# Patient Record
Sex: FEMALE | Race: WHITE
[De-identification: names, ages, dates, MRNs, and addresses within clinical notes are randomized per-mention and may not be internally consistent; named-entity substitution may affect disease eponyms.]

---

## 2018-08-03 ENCOUNTER — HOSPITAL ENCOUNTER (INPATIENT)
Dept: HOSPITAL 61 - 6 SOUTH | Age: 83
LOS: 14 days | Discharge: NURSING, REHAB OR LONG-TERM CARE | DRG: 374 | End: 2018-08-17
Attending: FAMILY MEDICINE | Admitting: FAMILY MEDICINE
Payer: MEDICARE

## 2018-08-03 VITALS — SYSTOLIC BLOOD PRESSURE: 125 MMHG | DIASTOLIC BLOOD PRESSURE: 51 MMHG

## 2018-08-03 VITALS — BODY MASS INDEX: 39.98 KG/M2 | HEIGHT: 62 IN | WEIGHT: 217.25 LBS

## 2018-08-03 VITALS — DIASTOLIC BLOOD PRESSURE: 55 MMHG | SYSTOLIC BLOOD PRESSURE: 118 MMHG

## 2018-08-03 VITALS — DIASTOLIC BLOOD PRESSURE: 49 MMHG | SYSTOLIC BLOOD PRESSURE: 126 MMHG

## 2018-08-03 DIAGNOSIS — M06.9: ICD-10-CM

## 2018-08-03 DIAGNOSIS — Z95.1: ICD-10-CM

## 2018-08-03 DIAGNOSIS — Z85.3: ICD-10-CM

## 2018-08-03 DIAGNOSIS — E78.5: ICD-10-CM

## 2018-08-03 DIAGNOSIS — K29.70: ICD-10-CM

## 2018-08-03 DIAGNOSIS — E83.42: ICD-10-CM

## 2018-08-03 DIAGNOSIS — N39.0: ICD-10-CM

## 2018-08-03 DIAGNOSIS — Z79.82: ICD-10-CM

## 2018-08-03 DIAGNOSIS — F32.9: ICD-10-CM

## 2018-08-03 DIAGNOSIS — E43: ICD-10-CM

## 2018-08-03 DIAGNOSIS — I27.20: ICD-10-CM

## 2018-08-03 DIAGNOSIS — Z96.653: ICD-10-CM

## 2018-08-03 DIAGNOSIS — C15.5: Primary | ICD-10-CM

## 2018-08-03 DIAGNOSIS — Z87.891: ICD-10-CM

## 2018-08-03 DIAGNOSIS — D50.9: ICD-10-CM

## 2018-08-03 DIAGNOSIS — K76.0: ICD-10-CM

## 2018-08-03 DIAGNOSIS — I25.10: ICD-10-CM

## 2018-08-03 DIAGNOSIS — I50.21: ICD-10-CM

## 2018-08-03 DIAGNOSIS — K59.00: ICD-10-CM

## 2018-08-03 DIAGNOSIS — R91.8: ICD-10-CM

## 2018-08-03 DIAGNOSIS — B96.81: ICD-10-CM

## 2018-08-03 DIAGNOSIS — Z79.01: ICD-10-CM

## 2018-08-03 DIAGNOSIS — K57.90: ICD-10-CM

## 2018-08-03 DIAGNOSIS — I63.40: ICD-10-CM

## 2018-08-03 DIAGNOSIS — E87.6: ICD-10-CM

## 2018-08-03 DIAGNOSIS — M19.90: ICD-10-CM

## 2018-08-03 DIAGNOSIS — K64.8: ICD-10-CM

## 2018-08-03 DIAGNOSIS — I25.5: ICD-10-CM

## 2018-08-03 DIAGNOSIS — E66.9: ICD-10-CM

## 2018-08-03 DIAGNOSIS — Z79.899: ICD-10-CM

## 2018-08-03 DIAGNOSIS — Z95.5: ICD-10-CM

## 2018-08-03 DIAGNOSIS — Z90.13: ICD-10-CM

## 2018-08-03 DIAGNOSIS — D50.0: ICD-10-CM

## 2018-08-03 DIAGNOSIS — Z91.011: ICD-10-CM

## 2018-08-03 DIAGNOSIS — F41.9: ICD-10-CM

## 2018-08-03 DIAGNOSIS — K21.9: ICD-10-CM

## 2018-08-03 DIAGNOSIS — E11.649: ICD-10-CM

## 2018-08-03 DIAGNOSIS — I11.0: ICD-10-CM

## 2018-08-03 DIAGNOSIS — D68.9: ICD-10-CM

## 2018-08-03 DIAGNOSIS — Z88.8: ICD-10-CM

## 2018-08-03 DIAGNOSIS — I48.0: ICD-10-CM

## 2018-08-03 DIAGNOSIS — K44.9: ICD-10-CM

## 2018-08-03 LAB
ALBUMIN SERPL-MCNC: 2.4 G/DL (ref 3.4–5)
ALBUMIN/GLOB SERPL: 0.6 {RATIO} (ref 1–1.7)
ALP SERPL-CCNC: 157 U/L (ref 46–116)
ALT SERPL-CCNC: 27 U/L (ref 14–59)
ANION GAP SERPL CALC-SCNC: 8 MMOL/L (ref 6–14)
AST SERPL-CCNC: 21 U/L (ref 15–37)
BASOPHILS # BLD AUTO: 0 X10^3/UL (ref 0–0.2)
BASOPHILS NFR BLD: 1 % (ref 0–3)
BILIRUB SERPL-MCNC: 0.5 MG/DL (ref 0.2–1)
BUN SERPL-MCNC: 17 MG/DL (ref 7–20)
BUN/CREAT SERPL: 17 (ref 6–20)
CALCIUM SERPL-MCNC: 7.7 MG/DL (ref 8.5–10.1)
CHLORIDE SERPL-SCNC: 102 MMOL/L (ref 98–107)
CO2 SERPL-SCNC: 26 MMOL/L (ref 21–32)
CREAT SERPL-MCNC: 1 MG/DL (ref 0.6–1)
EOSINOPHIL NFR BLD: 0 % (ref 0–3)
EOSINOPHIL NFR BLD: 0 X10^3/UL (ref 0–0.7)
ERYTHROCYTE [DISTWIDTH] IN BLOOD BY AUTOMATED COUNT: 19.7 % (ref 11.5–14.5)
GFR SERPLBLD BASED ON 1.73 SQ M-ARVRAT: 53.1 ML/MIN
GLOBULIN SER-MCNC: 4.2 G/DL (ref 2.2–3.8)
GLUCOSE SERPL-MCNC: 110 MG/DL (ref 70–99)
HCT VFR BLD CALC: 24.5 % (ref 36–47)
HGB BLD-MCNC: 8 G/DL (ref 12–15.5)
LYMPHOCYTES # BLD: 0.9 X10^3/UL (ref 1–4.8)
LYMPHOCYTES NFR BLD AUTO: 11 % (ref 24–48)
MCH RBC QN AUTO: 27 PG (ref 25–35)
MCHC RBC AUTO-ENTMCNC: 33 G/DL (ref 31–37)
MCV RBC AUTO: 83 FL (ref 79–100)
MONO #: 0.5 X10^3/UL (ref 0–1.1)
MONOCYTES NFR BLD: 7 % (ref 0–9)
NEUT #: 6.5 X10^3UL (ref 1.8–7.7)
NEUTROPHILS NFR BLD AUTO: 81 % (ref 31–73)
PLATELET # BLD AUTO: 369 X10^3/UL (ref 140–400)
POTASSIUM SERPL-SCNC: 3.7 MMOL/L (ref 3.5–5.1)
PROT SERPL-MCNC: 6.6 G/DL (ref 6.4–8.2)
PROTHROMBIN TIME: 39.9 SEC (ref 11.7–14)
RBC # BLD AUTO: 2.94 X10^6/UL (ref 3.5–5.4)
SODIUM SERPL-SCNC: 136 MMOL/L (ref 136–145)
WBC # BLD AUTO: 8 X10^3/UL (ref 4–11)

## 2018-08-03 PROCEDURE — 93880 EXTRACRANIAL BILAT STUDY: CPT

## 2018-08-03 PROCEDURE — 80053 COMPREHEN METABOLIC PANEL: CPT

## 2018-08-03 PROCEDURE — 85520 HEPARIN ASSAY: CPT

## 2018-08-03 PROCEDURE — 85045 AUTOMATED RETICULOCYTE COUNT: CPT

## 2018-08-03 PROCEDURE — 70450 CT HEAD/BRAIN W/O DYE: CPT

## 2018-08-03 PROCEDURE — 74177 CT ABD & PELVIS W/CONTRAST: CPT

## 2018-08-03 PROCEDURE — 70551 MRI BRAIN STEM W/O DYE: CPT

## 2018-08-03 PROCEDURE — 93306 TTE W/DOPPLER COMPLETE: CPT

## 2018-08-03 PROCEDURE — 82274 ASSAY TEST FOR BLOOD FECAL: CPT

## 2018-08-03 PROCEDURE — 71046 X-RAY EXAM CHEST 2 VIEWS: CPT

## 2018-08-03 PROCEDURE — 81001 URINALYSIS AUTO W/SCOPE: CPT

## 2018-08-03 PROCEDURE — 84443 ASSAY THYROID STIM HORMONE: CPT

## 2018-08-03 PROCEDURE — 88305 TISSUE EXAM BY PATHOLOGIST: CPT

## 2018-08-03 PROCEDURE — 78306 BONE IMAGING WHOLE BODY: CPT

## 2018-08-03 PROCEDURE — 71260 CT THORAX DX C+: CPT

## 2018-08-03 PROCEDURE — 83550 IRON BINDING TEST: CPT

## 2018-08-03 PROCEDURE — 82746 ASSAY OF FOLIC ACID SERUM: CPT

## 2018-08-03 PROCEDURE — 85027 COMPLETE CBC AUTOMATED: CPT

## 2018-08-03 PROCEDURE — 70552 MRI BRAIN STEM W/DYE: CPT

## 2018-08-03 PROCEDURE — 80048 BASIC METABOLIC PNL TOTAL CA: CPT

## 2018-08-03 PROCEDURE — A9503 TC99M MEDRONATE: HCPCS

## 2018-08-03 PROCEDURE — 87040 BLOOD CULTURE FOR BACTERIA: CPT

## 2018-08-03 PROCEDURE — 36415 COLL VENOUS BLD VENIPUNCTURE: CPT

## 2018-08-03 PROCEDURE — 83735 ASSAY OF MAGNESIUM: CPT

## 2018-08-03 PROCEDURE — 82607 VITAMIN B-12: CPT

## 2018-08-03 PROCEDURE — 85025 COMPLETE CBC W/AUTO DIFF WBC: CPT

## 2018-08-03 PROCEDURE — 45378 DIAGNOSTIC COLONOSCOPY: CPT

## 2018-08-03 PROCEDURE — 82728 ASSAY OF FERRITIN: CPT

## 2018-08-03 PROCEDURE — 82962 GLUCOSE BLOOD TEST: CPT

## 2018-08-03 PROCEDURE — 83540 ASSAY OF IRON: CPT

## 2018-08-03 PROCEDURE — A9585 GADOBUTROL INJECTION: HCPCS

## 2018-08-03 PROCEDURE — 83605 ASSAY OF LACTIC ACID: CPT

## 2018-08-03 PROCEDURE — 80061 LIPID PANEL: CPT

## 2018-08-03 PROCEDURE — 85610 PROTHROMBIN TIME: CPT

## 2018-08-03 RX ADMIN — ACETAMINOPHEN SCH MG: 500 TABLET ORAL at 20:44

## 2018-08-03 RX ADMIN — SIMVASTATIN SCH MG: 40 TABLET, FILM COATED ORAL at 20:43

## 2018-08-03 RX ADMIN — I-VITE, TAB 1000-60-2MG (60/BT) SCH TAB: TAB at 20:43

## 2018-08-03 RX ADMIN — CEFTRIAXONE SCH GM: 1 INJECTION, POWDER, FOR SOLUTION INTRAMUSCULAR; INTRAVENOUS at 19:50

## 2018-08-03 RX ADMIN — CARVEDILOL SCH MG: 3.12 TABLET, FILM COATED ORAL at 20:43

## 2018-08-04 VITALS — DIASTOLIC BLOOD PRESSURE: 55 MMHG | SYSTOLIC BLOOD PRESSURE: 133 MMHG

## 2018-08-04 VITALS — DIASTOLIC BLOOD PRESSURE: 40 MMHG | SYSTOLIC BLOOD PRESSURE: 115 MMHG

## 2018-08-04 VITALS — DIASTOLIC BLOOD PRESSURE: 46 MMHG | SYSTOLIC BLOOD PRESSURE: 122 MMHG

## 2018-08-04 VITALS — DIASTOLIC BLOOD PRESSURE: 48 MMHG | SYSTOLIC BLOOD PRESSURE: 147 MMHG

## 2018-08-04 VITALS — SYSTOLIC BLOOD PRESSURE: 127 MMHG | DIASTOLIC BLOOD PRESSURE: 58 MMHG

## 2018-08-04 VITALS — SYSTOLIC BLOOD PRESSURE: 123 MMHG | DIASTOLIC BLOOD PRESSURE: 48 MMHG

## 2018-08-04 LAB
APTT PPP: YELLOW S
BACTERIA #/AREA URNS HPF: (no result) /HPF
BILIRUB UR QL STRIP: NEGATIVE
FECAL OB PT: NEGATIVE
FIBRINOGEN PPP-MCNC: CLEAR MG/DL
NITRITE UR QL STRIP: NEGATIVE
PH UR STRIP: 6 [PH]
PROT UR STRIP-MCNC: NEGATIVE MG/DL
PROTHROMBIN TIME: 41.1 SEC (ref 11.7–14)
RBC #/AREA URNS HPF: (no result) /HPF (ref 0–2)
SQUAMOUS #/AREA URNS LPF: (no result) /LPF
UROBILINOGEN UR-MCNC: 0.2 MG/DL
WBC #/AREA URNS HPF: (no result) /HPF (ref 0–4)

## 2018-08-04 RX ADMIN — PANTOPRAZOLE SODIUM SCH MG: 40 TABLET, DELAYED RELEASE ORAL at 08:33

## 2018-08-04 RX ADMIN — CLOPIDOGREL BISULFATE SCH MG: 75 TABLET ORAL at 08:33

## 2018-08-04 RX ADMIN — INSULIN LISPRO SCH UNITS: 100 INJECTION, SOLUTION INTRAVENOUS; SUBCUTANEOUS at 17:09

## 2018-08-04 RX ADMIN — ISOSORBIDE MONONITRATE SCH MG: 30 TABLET, EXTENDED RELEASE ORAL at 08:34

## 2018-08-04 RX ADMIN — CHOLESTYRAMINE SCH GM: 4 POWDER, FOR SUSPENSION ORAL at 08:36

## 2018-08-04 RX ADMIN — Medication SCH MG: at 08:34

## 2018-08-04 RX ADMIN — I-VITE, TAB 1000-60-2MG (60/BT) SCH TAB: TAB at 20:25

## 2018-08-04 RX ADMIN — LISINOPRIL SCH MG: 10 TABLET ORAL at 08:34

## 2018-08-04 RX ADMIN — WARFARIN SODIUM SCH MG: 7.5 TABLET ORAL at 14:15

## 2018-08-04 RX ADMIN — INSULIN LISPRO SCH UNITS: 100 INJECTION, SOLUTION INTRAVENOUS; SUBCUTANEOUS at 12:00

## 2018-08-04 RX ADMIN — CARVEDILOL SCH MG: 3.12 TABLET, FILM COATED ORAL at 16:10

## 2018-08-04 RX ADMIN — DOCUSATE SODIUM SCH MG: 100 CAPSULE, LIQUID FILLED ORAL at 08:33

## 2018-08-04 RX ADMIN — BUMETANIDE SCH MG: 1 TABLET ORAL at 08:34

## 2018-08-04 RX ADMIN — ACETAMINOPHEN SCH MG: 500 TABLET ORAL at 08:33

## 2018-08-04 RX ADMIN — INSULIN GLARGINE SCH UNITS: 100 INJECTION, SOLUTION SUBCUTANEOUS at 08:00

## 2018-08-04 RX ADMIN — SIMVASTATIN SCH MG: 40 TABLET, FILM COATED ORAL at 20:25

## 2018-08-04 RX ADMIN — INSULIN LISPRO SCH UNITS: 100 INJECTION, SOLUTION INTRAVENOUS; SUBCUTANEOUS at 08:00

## 2018-08-04 RX ADMIN — CARVEDILOL SCH MG: 3.12 TABLET, FILM COATED ORAL at 08:34

## 2018-08-04 RX ADMIN — CITALOPRAM HYDROBROMIDE SCH MG: 10 TABLET ORAL at 08:33

## 2018-08-04 RX ADMIN — I-VITE, TAB 1000-60-2MG (60/BT) SCH TAB: TAB at 08:33

## 2018-08-04 RX ADMIN — ACETAMINOPHEN SCH MG: 500 TABLET ORAL at 20:25

## 2018-08-04 RX ADMIN — CEFTRIAXONE SCH GM: 1 INJECTION, POWDER, FOR SOLUTION INTRAMUSCULAR; INTRAVENOUS at 16:10

## 2018-08-04 NOTE — PDOC
GENERAL


General:


feels somewhat better this am. minimal sweating overnight and no chills. 

remains sob. Hb office earlier this week was 8.6 and is 8.0 this am. willl 

check cxr this am and get GI consult for iron deficiency anemia. continue 

antibiotics with profound chills and sweats prior to admission. patient denies 

any hematochezia, melena, or hematemesis. see dictated H&P.





VITAL SIGNS


Vital Signs:





Vital Signs








  Date Time  Temp Pulse Resp B/P (MAP) Pulse Ox O2 Delivery O2 Flow Rate FiO2


 


8/4/18 08:34  101  127/58    


 


8/4/18 07:24      Room Air  


 


8/4/18 07:00 97.8  16  98   





 97.8       











I & O


I & O











Intake and Output 


 


 8/4/18





 07:00


 


Intake Total 400 ml


 


Output Total 350 ml


 


Balance 50 ml


 


 


 


Intake Oral 400 ml


 


Output Urine Total 350 ml











ALLERGIES


Allergies:





Allergies








Coded Allergies Type Severity Reaction Last Updated Verified


 


  milk Allergy Intermediate  8/3/18 Yes


 


  montelukast Allergy Intermediate  8/3/18 Yes











MEDS


Medications:





Current Medications








 Medications


  (Trade)  Dose


 Ordered  Sig/Gerry  Start Time


 Stop Time Status Last Admin


Dose Admin


 


 Acetaminophen


  (Tylenol)  1,000 mg  BID  8/3/18 21:00


    8/4/18 08:33


1,000 MG


 


 Alprazolam


  (Xanax)  0.25 mg  PRN Q6HRS  PRN  8/3/18 18:45


     





 


 Bumetanide


  (Bumex)  2 mg  DAILY  8/4/18 09:00


    8/4/18 08:34


2 MG


 


 Carvedilol


  (Coreg)  3.125 mg  BIDWMEALS  8/3/18 21:00


    8/4/18 08:34


3.125 MG


 


 Ceftriaxone


 Sodium 1 gm/


 Dextrose  50 ml @ 


 100 mls/hr  Q24H  8/3/18 15:45


   UNV  





 


 Ceftriaxone Sodium


  (Rocephin)  1 gm  Q24H  8/3/18 17:00


    8/3/18 19:50


1 GM


 


 Cholestyramine


 Resin


  (Questran Light)  4 gm  DAILY  8/4/18 09:00


     





 


 Citalopram


 Hydrobromide


  (CeleXA)  10 mg  DAILY  8/4/18 09:00


    8/4/18 08:33


10 MG


 


 Clopidogrel


 Bisulfate


  (Plavix)  75 mg  DAILY  8/4/18 09:00


    8/4/18 08:33


75 MG


 


 Diphenhydramine


 HCl


  (Benadryl)  50 mg  QHS  8/3/18 21:00


    8/3/18 21:11


50 MG


 


 Docusate Sodium


  (Colace)  100 mg  DAILY  8/4/18 09:00


    8/4/18 08:33


100 MG


 


 Fish Oil


  (Fish Oil)  1,000 mg  DAILY  8/4/18 09:00


    8/4/18 08:34


1,000 MG


 


 Insulin Glargine


  (Lantus)  20 units  [id number]  8/4/18 08:00


     





 


 Insulin Human


 Lispro


  (HumaLOG)  5 units  TIDWMEALS  8/4/18 08:00


     





 


 Isosorbide


 Mononitrate


  (Imdur)  30 mg  DAILY  8/4/18 09:00


    8/4/18 08:34


30 MG


 


 Lisinopril


  (Prinivil)  10 mg  DAILY  8/4/18 09:00


    8/4/18 08:34


10 MG


 


 Methotrexate


  (Rheumatrex)  15 mg  WEEKLY  8/9/18 09:00


     





 


 Multivitamins/


 Minerals


  (I-Josias)  1 tab  BID  8/3/18 21:00


    8/4/18 08:33


1 TAB


 


 Non-Formulary


 Medication


  (Alendronate


 Sodium )  70 mg  WEEKLY  8/10/18 09:00


   UNV  





 


 Pantoprazole


 Sodium


  (Protonix)  40 mg  DAILYAC  8/4/18 07:30


    8/4/18 08:33


40 MG


 


 Simvastatin


  (Zocor)  40 mg  QHS  8/3/18 21:00


    8/3/18 20:43


40 MG


 


 Sodium Chloride  1,000 ml @ 


 125 mls/hr  1X  ONCE  8/3/18 15:45


 8/3/18 23:44 DC 8/3/18 19:50


125 MLS/HR


 


 Warfarin Sodium


  (Coumadin Per


 Physician)  1 each  PRN DAILY  PRN  8/3/18 20:00


     





 


 Warfarin Sodium


  (Coumadin)  7.5 mg  DAILY16  8/4/18 16:00


     














LAB


Lab:





Laboratory Tests








Test


 8/3/18


16:05 8/3/18


20:35 8/4/18


07:04 8/4/18


09:20


 


White Blood Count


 8.0 x10^3/uL


(4.0-11.0) 


 


 





 


Red Blood Count


 2.94 x10^6/uL


(3.50-5.40) 


 


 





 


Hemoglobin


 8.0 g/dL


(12.0-15.5) 


 


 





 


Hematocrit


 24.5 %


(36.0-47.0) 


 


 





 


Mean Corpuscular Volume 83 fL ()    


 


Mean Corpuscular Hemoglobin 27 pg (25-35)    


 


Mean Corpuscular Hemoglobin


Concent 33 g/dL


(31-37) 


 


 





 


Red Cell Distribution Width


 19.7 %


(11.5-14.5) 


 


 





 


Platelet Count


 369 x10^3/uL


(140-400) 


 


 





 


Neutrophils (%) (Auto) 81 % (31-73)    


 


Lymphocytes (%) (Auto) 11 % (24-48)    


 


Monocytes (%) (Auto) 7 % (0-9)    


 


Eosinophils (%) (Auto) 0 % (0-3)    


 


Basophils (%) (Auto) 1 % (0-3)    


 


Neutrophils # (Auto)


 6.5 x10^3uL


(1.8-7.7) 


 


 





 


Lymphocytes # (Auto)


 0.9 x10^3/uL


(1.0-4.8) 


 


 





 


Monocytes # (Auto)


 0.5 x10^3/uL


(0.0-1.1) 


 


 





 


Eosinophils # (Auto)


 0.0 x10^3/uL


(0.0-0.7) 


 


 





 


Basophils # (Auto)


 0.0 x10^3/uL


(0.0-0.2) 


 


 





 


Prothrombin Time


 39.9 SEC


(11.7-14.0) 


 


 





 


Prothromb Time International


Ratio 4.2 (0.8-1.1) 


 


 


 





 


Sodium Level


 136 mmol/L


(136-145) 


 


 





 


Potassium Level


 3.7 mmol/L


(3.5-5.1) 


 


 





 


Chloride Level


 102 mmol/L


() 


 


 





 


Carbon Dioxide Level


 26 mmol/L


(21-32) 


 


 





 


Anion Gap 8 (6-14)    


 


Blood Urea Nitrogen


 17 mg/dL


(7-20) 


 


 





 


Creatinine


 1.0 mg/dL


(0.6-1.0) 


 


 





 


Estimated GFR


(Cockcroft-Gault) 53.1 


 


 


 





 


BUN/Creatinine Ratio 17 (6-20)    


 


Glucose Level


 110 mg/dL


(70-99) 


 


 





 


Calcium Level


 7.7 mg/dL


(8.5-10.1) 


 


 





 


Iron Level


 15 ug/dL


() 


 


 





 


Total Iron Binding Capacity


 216 ug/dL


(250-450) 


 


 





 


Iron Saturation 7 % (15-34)    


 


Ferritin


 141 ng/mL


(8-252) 


 


 





 


Total Bilirubin


 0.5 mg/dL


(0.2-1.0) 


 


 





 


Aspartate Amino Transf


(AST/SGOT) 21 U/L (15-37) 


 


 


 





 


Alanine Aminotransferase


(ALT/SGPT) 27 U/L (14-59) 


 


 


 





 


Alkaline Phosphatase


 157 U/L


() 


 


 





 


Total Protein


 6.6 g/dL


(6.4-8.2) 


 


 





 


Albumin


 2.4 g/dL


(3.4-5.0) 


 


 





 


Albumin/Globulin Ratio 0.6 (1.0-1.7)    


 


Glucose (Fingerstick)


 


 98 mg/dL


(70-99) 96 mg/dL


(70-99) 





 


Urine Collection Type    Unknown 


 


Urine Color    Yellow 


 


Urine Clarity    Clear 


 


Urine pH    6.0 


 


Urine Specific Gravity    1.015 


 


Urine Protein


 


 


 


 Negative mg/dL


(NEG-TRACE)


 


Urine Glucose (UA)


 


 


 


 Negative mg/dL


(NEG)


 


Urine Ketones (Stick)


 


 


 


 Negative mg/dL


(NEG)


 


Urine Blood    Negative (NEG) 


 


Urine Nitrite    Negative (NEG) 


 


Urine Bilirubin    Negative (NEG) 


 


Urine Urobilinogen Dipstick


 


 


 


 0.2 mg/dL (0.2


mg/dL)


 


Urine Leukocyte Esterase    Negative (NEG) 


 


Urine RBC    Occ /HPF (0-2) 


 


Urine WBC    1-4 /HPF (0-4) 


 


Urine Squamous Epithelial


Cells 


 


 


 Few /LPF 





 


Urine Bacteria


 


 


 


 Few /HPF


(0-FEW)


 


Urine Mucus    Slight /LPF 

















MARSHALL RICHARDS MD Aug 4, 2018 10:43

## 2018-08-04 NOTE — PDOC2
GI CONSULT


Reason For Consult:


MIHAELA





HPI:


HPI:


Pleasant 81 y/o female admitted for evaluation of recent anemia and ENGLISH.  Here 

has iron studies c/w MIHAELA superimposed on ACD.  Has h/o heartburn in the past; 

taking daily omeprazole.  Some recent vague swallowing issues, not clearly true 

dysphagia.  No PUD, Liver or Pancreatic history.  S/p moncho.  No tobacco, 

alcohol use of note.  On Plavix and coumadin at home; INR a bit prolonged here.

  Denies overt blood in stool or melena.  No N, V.   H/o probable bile salt-

mediated diarrhea after the moncho; takes Questran off and on.  Has had 

occasional constipation.  Wt/appetite OK.  Thinks had remote colonoscopy ([facility]?

) in the past.  Recalls no findings.  No prior EGD.  GIFH negative.





PMH:


PMH:


ASHD, TIA's, HLP, HTN, Breast cancer (lucina?), OA, DM, anxiety/depression.  S/p 

coronary stenting, CABG, left lumpectomy, right mastectomy, bilateral TKR's.





FH:


Family History:  Other (No GI issues.)





Social History:


Smoke:  No


ALCOHOL:  none


Drugs:  None





ROS:





GEN: Denies fevers, chills, sweats


HEENT: Denies blurred vision, sore throat


CV: Denies chest pain


RESP: Exertional shortness of air, no cough


GI: Per HPI


: Denies hematuria, dysuria


ENDO: Denies weight changes


NEURO: Denies confusion, dizziness


MSK: Denies weakness, joint pain/swelling


SKIN: Denies jaundice, pruritus





Vitals:


Vitals:





 Vital Signs








  Date Time  Temp Pulse Resp B/P (MAP) Pulse Ox O2 Delivery O2 Flow Rate FiO2


 


8/4/18 10:48 98.3 89 16 123/48 (73) 93 Room Air  





 98.3       











Labs:


Labs:





Laboratory Tests








Test


 8/3/18


16:05 8/3/18


20:35 8/4/18


07:04 8/4/18


09:20


 


White Blood Count


 8.0 x10^3/uL


(4.0-11.0) 


 


 





 


Red Blood Count


 2.94 x10^6/uL


(3.50-5.40) 


 


 





 


Hemoglobin


 8.0 g/dL


(12.0-15.5) 


 


 





 


Hematocrit


 24.5 %


(36.0-47.0) 


 


 





 


Mean Corpuscular Volume 83 fL ()    


 


Mean Corpuscular Hemoglobin 27 pg (25-35)    


 


Mean Corpuscular Hemoglobin


Concent 33 g/dL


(31-37) 


 


 





 


Red Cell Distribution Width


 19.7 %


(11.5-14.5) 


 


 





 


Platelet Count


 369 x10^3/uL


(140-400) 


 


 





 


Neutrophils (%) (Auto) 81 % (31-73)    


 


Lymphocytes (%) (Auto) 11 % (24-48)    


 


Monocytes (%) (Auto) 7 % (0-9)    


 


Eosinophils (%) (Auto) 0 % (0-3)    


 


Basophils (%) (Auto) 1 % (0-3)    


 


Neutrophils # (Auto)


 6.5 x10^3uL


(1.8-7.7) 


 


 





 


Lymphocytes # (Auto)


 0.9 x10^3/uL


(1.0-4.8) 


 


 





 


Monocytes # (Auto)


 0.5 x10^3/uL


(0.0-1.1) 


 


 





 


Eosinophils # (Auto)


 0.0 x10^3/uL


(0.0-0.7) 


 


 





 


Basophils # (Auto)


 0.0 x10^3/uL


(0.0-0.2) 


 


 





 


Prothrombin Time


 39.9 SEC


(11.7-14.0) 


 


 





 


Prothromb Time International


Ratio 4.2 (0.8-1.1) 


 


 


 





 


Sodium Level


 136 mmol/L


(136-145) 


 


 





 


Potassium Level


 3.7 mmol/L


(3.5-5.1) 


 


 





 


Chloride Level


 102 mmol/L


() 


 


 





 


Carbon Dioxide Level


 26 mmol/L


(21-32) 


 


 





 


Anion Gap 8 (6-14)    


 


Blood Urea Nitrogen


 17 mg/dL


(7-20) 


 


 





 


Creatinine


 1.0 mg/dL


(0.6-1.0) 


 


 





 


Estimated GFR


(Cockcroft-Gault) 53.1 


 


 


 





 


BUN/Creatinine Ratio 17 (6-20)    


 


Glucose Level


 110 mg/dL


(70-99) 


 


 





 


Calcium Level


 7.7 mg/dL


(8.5-10.1) 


 


 





 


Iron Level


 15 ug/dL


() 


 


 





 


Total Iron Binding Capacity


 216 ug/dL


(250-450) 


 


 





 


Iron Saturation 7 % (15-34)    


 


Ferritin


 141 ng/mL


(8-252) 


 


 





 


Total Bilirubin


 0.5 mg/dL


(0.2-1.0) 


 


 





 


Aspartate Amino Transf


(AST/SGOT) 21 U/L (15-37) 


 


 


 





 


Alanine Aminotransferase


(ALT/SGPT) 27 U/L (14-59) 


 


 


 





 


Alkaline Phosphatase


 157 U/L


() 


 


 





 


Total Protein


 6.6 g/dL


(6.4-8.2) 


 


 





 


Albumin


 2.4 g/dL


(3.4-5.0) 


 


 





 


Albumin/Globulin Ratio 0.6 (1.0-1.7)    


 


Glucose (Fingerstick)


 


 98 mg/dL


(70-99) 96 mg/dL


(70-99) 





 


Urine Collection Type    Unknown 


 


Urine Color    Yellow 


 


Urine Clarity    Clear 


 


Urine pH    6.0 


 


Urine Specific Gravity    1.015 


 


Urine Protein


 


 


 


 Negative mg/dL


(NEG-TRACE)


 


Urine Glucose (UA)


 


 


 


 Negative mg/dL


(NEG)


 


Urine Ketones (Stick)


 


 


 


 Negative mg/dL


(NEG)


 


Urine Blood    Negative (NEG) 


 


Urine Nitrite    Negative (NEG) 


 


Urine Bilirubin    Negative (NEG) 


 


Urine Urobilinogen Dipstick


 


 


 


 0.2 mg/dL (0.2


mg/dL)


 


Urine Leukocyte Esterase    Negative (NEG) 


 


Urine RBC    Occ /HPF (0-2) 


 


Urine WBC    1-4 /HPF (0-4) 


 


Urine Squamous Epithelial


Cells 


 


 


 Few /LPF 





 


Urine Bacteria


 


 


 


 Few /HPF


(0-FEW)


 


Urine Mucus    Slight /LPF 


 


Test


 8/4/18


12:20 8/4/18


12:25 


 





 


Prothrombin Time


 41.1 SEC


(11.7-14.0) 


 


 





 


Prothromb Time International


Ratio 4.4 (0.8-1.1) 


 


 


 





 


Glucose (Fingerstick)


 


 140 mg/dL


(70-99) 


 














ACD iron studies, but <10% saturation believable.





Allergies:


Coded Allergies:  


     milk (Verified  Allergy, Intermediate, 8/3/18)


     montelukast (Verified  Allergy, Intermediate, 8/3/18)





Medications:





Current Medications








 Medications


  (Trade)  Dose


 Ordered  Sig/Gerry


 Route


 PRN Reason  Start Time


 Stop Time Status Last Admin


Dose Admin


 


 Sodium Chloride  1,000 ml @ 


 125 mls/hr  1X  ONCE


 IV


   8/3/18 15:45


 8/3/18 23:44 DC 8/3/18 19:50





 


 Ceftriaxone Sodium


  (Rocephin)  1 gm  Q24H


 IVP


   8/3/18 17:00


    8/3/18 19:50





 


 Acetaminophen


  (Tylenol)  1,000 mg  BID


 PO


   8/3/18 21:00


    8/4/18 08:33





 


 Carvedilol


  (Coreg)  3.125 mg  BIDWMEALS


 PO


   8/3/18 21:00


    8/4/18 08:34





 


 Citalopram


 Hydrobromide


  (CeleXA)  10 mg  DAILY


 PO


   8/4/18 09:00


    8/4/18 08:33





 


 Clopidogrel


 Bisulfate


  (Plavix)  75 mg  DAILY


 PO


   8/4/18 09:00


    8/4/18 08:33





 


 Diphenhydramine


 HCl


  (Benadryl)  50 mg  QHS


 PO


   8/3/18 21:00


    8/3/18 21:11





 


 Docusate Sodium


  (Colace)  100 mg  DAILY


 PO


   8/4/18 09:00


    8/4/18 08:33





 


 Isosorbide


 Mononitrate


  (Imdur)  30 mg  DAILY


 PO


   8/4/18 09:00


    8/4/18 08:34





 


 Lisinopril


  (Prinivil)  10 mg  DAILY


 PO


   8/4/18 09:00


    8/4/18 08:34





 


 Multivitamins/


 Minerals


  (I-Josias)  1 tab  BID


 PO


   8/3/18 21:00


    8/4/18 08:33





 


 Bumetanide


  (Bumex)  2 mg  DAILY


 PO


   8/4/18 09:00


    8/4/18 08:34





 


 Fish Oil


  (Fish Oil)  1,000 mg  DAILY


 PO


   8/4/18 09:00


    8/4/18 08:34





 


 Pantoprazole


 Sodium


  (Protonix)  40 mg  DAILYAC


 PO


   8/4/18 07:30


    8/4/18 08:33





 


 Simvastatin


  (Zocor)  40 mg  QHS


 PO


   8/3/18 21:00


    8/3/18 20:43














Imaging:


Imaging:


No GI imaging.





PE:





GEN: NAD


HEENT: Atraumatic, PERRLA


LUNGS: CTAB


HEART: IRRR, no murmurs


ABD: NABS, S/ND/NT, no masses


EXTREMITY: Maybe trace edema, LE's


SKIN: No rashes, no jaundice


NEURO/PSYCH: A & O 3





A/P:


A/P:


IMP: Iron deficiency anemia, cause uncertain, but worthy of investigation at 

some point.


        Clinical GERD, maintained on PPI.


        S/p moncho.


        H/o probable bile salt-mediated diarrhea; has been less of a problem 

over the years.


        H/o remote colonoscopy, findings unclear.


        Elevated INR above therapeutic.





REC: Would continue whatever non-GI w/u you have planned.


         Hold warfarin and allow INR to fall.


         Will check our EMR and see if prior endoscopies there.


         Ultimately should consider pan-endoscopy.


         Will check thyroid; apparently some FH of this.





--other pending.





Thank you for allowing me to assist in the care of this patient.  Please call 

if questions.











STEPHEN MCKENZIE MD Aug 4, 2018 14:20

## 2018-08-04 NOTE — HP
ADMIT DATE:  08/03/2018



CHIEF COMPLAINT AND HISTORY OF PRESENT ILLNESS:  This is an 82-year-old white

female well known to me from followup in the office.  The patient was seen

earlier in the week with complaints of shortness of breath with exertion and

possibly some chest pressure with the same.  Workup at that point in time found

her to be anemic with hemoglobin of 8.6 and decreased red blood cell indices. 

She was rechecked on the day of admission, was worse and now was reporting night

sweats as well as chills, soaking sheets in the bed and having to change

clothes.  She appeared quite ill in the office and it was elected to admit her

to have further workup of her new-onset shortness of breath, possibly related to

the anemia as well as the sweats and chills.



PAST MEDICAL HISTORY:  Remarkable for prior TIAs.  She has had coronary artery

disease with prior stenting.



PAST SURGICAL HISTORY:  She has had prior breast cancer with a right mastectomy

and left lumpectomy.  She has had bilateral knee replacements.



MEDICATIONS:  Brought with the patient, listed on the computer and have been

addressed.



ALLERGIES:  SHE IS ALLERGIC TO MONTELUKAST AND MILK.



SOCIAL HISTORY:  She is , nonsmoker, nondrinker, does not abuse drugs,

has a very supportive family.



FAMILY HISTORY:  Noncontributory.



REVIEW OF SYSTEMS:  As mentioned above.



PHYSICAL EXAMINATION:

GENERAL:  She is well-developed, well-nourished white female, who appears ill.

VITAL SIGNS:  Stable.  She is afebrile in the office.  Blood pressure is low at

90/50 which is out of character for her; pulse is not elevated, however, and is

in the 80s.

HEAD, EYES, EARS, NOSE, AND THROAT:  Remarkable for pallor of the conjunctivae.

NECK:  Supple without bruit or thyromegaly.

CHEST:  Reveals distant breath sounds, but clear.

HEART:  Irregularly irregular without S3, S4 or murmur.  Rate is 80.

ABDOMEN:  Soft, nontender, without hepatosplenomegaly or mass.

EXTREMITIES:  Without cyanosis, clubbing, or significant edema.

NEUROLOGIC:  She is intact.



IMPRESSION:

1.  New-onset shortness of breath with anemia.

2.  Chills, sweats of uncertain etiology.

3.  Atrial fibrillation.

4.  Coronary artery disease.



PLAN:  The patient has been admitted, workup for infection as well as followup

of her new-onset anemia will be done.  I am going to check iron levels.  In

addition, antibiotics will be started pending further investigation.  The

patient will be monitored, managed and treated appropriately.

 



______________________________

MARSHALL RICHARDS MD



DR:  VENKATA/tu  JOB#:  [id number] / [id number]

DD:  08/04/2018 10:47  DT:  08/04/2018 11:20

## 2018-08-04 NOTE — RAD
Chest, PA and Lateral:

 

Technique:  PA and lateral views of the chest were obtained.

 

History:  Shortness of breath, chills.

 

Comparison: None available.

 

Findings:

 Mild cardiomegaly. Median sternotomy wires identified. There is mild 

prominent appearing bilateral interstitial lung markings.. Moderate 

degenerative changes thoracic spine.. The pleural margins are clear.

 

Impression:

 

Mild prominent appearing bilateral interstitial lung markings likely 

congestive changes.. 

 

Electronically signed by: Jose Ramires MD (8/4/2018 11:58 AM) [facility]

## 2018-08-05 VITALS — SYSTOLIC BLOOD PRESSURE: 114 MMHG | DIASTOLIC BLOOD PRESSURE: 48 MMHG

## 2018-08-05 VITALS — DIASTOLIC BLOOD PRESSURE: 51 MMHG | SYSTOLIC BLOOD PRESSURE: 137 MMHG

## 2018-08-05 VITALS — DIASTOLIC BLOOD PRESSURE: 49 MMHG | SYSTOLIC BLOOD PRESSURE: 129 MMHG

## 2018-08-05 VITALS — DIASTOLIC BLOOD PRESSURE: 57 MMHG | SYSTOLIC BLOOD PRESSURE: 114 MMHG

## 2018-08-05 VITALS — SYSTOLIC BLOOD PRESSURE: 113 MMHG | DIASTOLIC BLOOD PRESSURE: 62 MMHG

## 2018-08-05 VITALS — DIASTOLIC BLOOD PRESSURE: 45 MMHG | SYSTOLIC BLOOD PRESSURE: 126 MMHG

## 2018-08-05 LAB
BASOPHILS # BLD AUTO: 0 X10^3/UL (ref 0–0.2)
BASOPHILS NFR BLD: 1 % (ref 0–3)
EOSINOPHIL NFR BLD: 0.1 X10^3/UL (ref 0–0.7)
EOSINOPHIL NFR BLD: 1 % (ref 0–3)
ERYTHROCYTE [DISTWIDTH] IN BLOOD BY AUTOMATED COUNT: 19.4 % (ref 11.5–14.5)
HCT VFR BLD CALC: 23.5 % (ref 36–47)
HGB BLD-MCNC: 7.8 G/DL (ref 12–15.5)
LYMPHOCYTES # BLD: 1.2 X10^3/UL (ref 1–4.8)
LYMPHOCYTES NFR BLD AUTO: 21 % (ref 24–48)
MCH RBC QN AUTO: 28 PG (ref 25–35)
MCHC RBC AUTO-ENTMCNC: 33 G/DL (ref 31–37)
MCV RBC AUTO: 84 FL (ref 79–100)
MONO #: 0.6 X10^3/UL (ref 0–1.1)
MONOCYTES NFR BLD: 10 % (ref 0–9)
NEUT #: 4 X10^3UL (ref 1.8–7.7)
NEUTROPHILS NFR BLD AUTO: 67 % (ref 31–73)
PLATELET # BLD AUTO: 327 X10^3/UL (ref 140–400)
PROTHROMBIN TIME: 37.1 SEC (ref 11.7–14)
RBC # BLD AUTO: 2.82 X10^6/UL (ref 3.5–5.4)
WBC # BLD AUTO: 6 X10^3/UL (ref 4–11)

## 2018-08-05 RX ADMIN — CLOPIDOGREL BISULFATE SCH MG: 75 TABLET ORAL at 08:07

## 2018-08-05 RX ADMIN — INSULIN LISPRO SCH UNITS: 100 INJECTION, SOLUTION INTRAVENOUS; SUBCUTANEOUS at 08:00

## 2018-08-05 RX ADMIN — CARVEDILOL SCH MG: 3.12 TABLET, FILM COATED ORAL at 16:07

## 2018-08-05 RX ADMIN — WARFARIN SODIUM SCH MG: 7.5 TABLET ORAL at 08:44

## 2018-08-05 RX ADMIN — ISOSORBIDE MONONITRATE SCH MG: 30 TABLET, EXTENDED RELEASE ORAL at 08:08

## 2018-08-05 RX ADMIN — CEFTRIAXONE SCH GM: 1 INJECTION, POWDER, FOR SOLUTION INTRAMUSCULAR; INTRAVENOUS at 16:09

## 2018-08-05 RX ADMIN — INSULIN GLARGINE SCH UNITS: 100 INJECTION, SOLUTION SUBCUTANEOUS at 08:00

## 2018-08-05 RX ADMIN — I-VITE, TAB 1000-60-2MG (60/BT) SCH TAB: TAB at 21:07

## 2018-08-05 RX ADMIN — INSULIN LISPRO SCH UNITS: 100 INJECTION, SOLUTION INTRAVENOUS; SUBCUTANEOUS at 11:26

## 2018-08-05 RX ADMIN — DOCUSATE SODIUM SCH MG: 100 CAPSULE, LIQUID FILLED ORAL at 08:08

## 2018-08-05 RX ADMIN — CARVEDILOL SCH MG: 3.12 TABLET, FILM COATED ORAL at 08:08

## 2018-08-05 RX ADMIN — ACETAMINOPHEN SCH MG: 500 TABLET ORAL at 08:08

## 2018-08-05 RX ADMIN — ACETAMINOPHEN SCH MG: 500 TABLET ORAL at 21:00

## 2018-08-05 RX ADMIN — INSULIN LISPRO SCH UNITS: 100 INJECTION, SOLUTION INTRAVENOUS; SUBCUTANEOUS at 16:56

## 2018-08-05 RX ADMIN — CITALOPRAM HYDROBROMIDE SCH MG: 10 TABLET ORAL at 08:08

## 2018-08-05 RX ADMIN — BUMETANIDE SCH MG: 1 TABLET ORAL at 08:07

## 2018-08-05 RX ADMIN — PANTOPRAZOLE SODIUM SCH MG: 40 TABLET, DELAYED RELEASE ORAL at 08:08

## 2018-08-05 RX ADMIN — LISINOPRIL SCH MG: 10 TABLET ORAL at 08:08

## 2018-08-05 RX ADMIN — Medication SCH MG: at 08:07

## 2018-08-05 RX ADMIN — CHOLESTYRAMINE SCH GM: 4 POWDER, FOR SUSPENSION ORAL at 08:10

## 2018-08-05 RX ADMIN — I-VITE, TAB 1000-60-2MG (60/BT) SCH TAB: TAB at 08:07

## 2018-08-05 RX ADMIN — SIMVASTATIN SCH MG: 40 TABLET, FILM COATED ORAL at 21:07

## 2018-08-05 NOTE — PN
DATE:  08/05/2018



LOCATION:  She is in room 648.



SUBJECTIVE:  The patient is awake and alert; still having short of breath,

particularly overnight.  Discussion with nursing states that anytime she does

get up and go to the bathroom or anything exertional.  She is quite short of

breath, but also her heart rate increases up in the 130s and 140s with the AFib,

which may be part of her shortness of breath in addition



OBJECTIVE:

VITAL SIGNS:  Stable.  She is afebrile.  Intake greater than output.  Hemoglobin

decreased at 7.8 this morning.  Heme tested stool has been negative.  Sugars are

good.  INR has drifted down to 3.9 and Coumadin will be held again this morning.

 Once again, she is having AFib with increased rates with exertion.  Her GI

suggested TSH and we will check that in addition.

CHEST:  Clear.

HEART:  Regular.

ABDOMEN:  Benign.



IMPRESSION:

1.  Shortness of breath, multifactorial with possibilities of the iron

deficiency anemia, congestive heart failure and atrial fibrillation with rapid

ventricular response.

2.  Iron deficiency anemia.

3.  Diabetes.

4.  Coagulopathy.



PLAN:  I am going to ask Cardiology to see her in consultation today.  Her

Coumadin will be held.  I am also going to give her a dose of IV Lasix x 1 this

morning.  We will continue to follow INRs.  GI is planning for panendoscopy

after coagulopathy is resolved as noted.

 



______________________________

MARSHALL RICHARDS MD



DR:  VENKATA/tu  JOB#:  [id number] / [id number]

DD:  08/05/2018 11:42  DT:  08/05/2018 23:57

## 2018-08-05 NOTE — PDOC
G I PROGRESS NOTE


Subjective


Dyspneic last night.


Physical Exam


Lungs clear anteriorly.


RRR


Abdomen soft, not tender nor distended.


Review of Relevant


I have reviewed the following items claudio (where applicable) has been applied.


Labs





Laboratory Tests








Test


 8/3/18


16:05 8/3/18


20:35 8/4/18


07:04 8/4/18


09:20


 


White Blood Count


 8.0 x10^3/uL


(4.0-11.0) 


 


 





 


Red Blood Count


 2.94 x10^6/uL


(3.50-5.40) 


 


 





 


Hemoglobin


 8.0 g/dL


(12.0-15.5) 


 


 





 


Hematocrit


 24.5 %


(36.0-47.0) 


 


 





 


Mean Corpuscular Volume 83 fL ()    


 


Mean Corpuscular Hemoglobin 27 pg (25-35)    


 


Mean Corpuscular Hemoglobin


Concent 33 g/dL


(31-37) 


 


 





 


Red Cell Distribution Width


 19.7 %


(11.5-14.5) 


 


 





 


Platelet Count


 369 x10^3/uL


(140-400) 


 


 





 


Neutrophils (%) (Auto) 81 % (31-73)    


 


Lymphocytes (%) (Auto) 11 % (24-48)    


 


Monocytes (%) (Auto) 7 % (0-9)    


 


Eosinophils (%) (Auto) 0 % (0-3)    


 


Basophils (%) (Auto) 1 % (0-3)    


 


Neutrophils # (Auto)


 6.5 x10^3uL


(1.8-7.7) 


 


 





 


Lymphocytes # (Auto)


 0.9 x10^3/uL


(1.0-4.8) 


 


 





 


Monocytes # (Auto)


 0.5 x10^3/uL


(0.0-1.1) 


 


 





 


Eosinophils # (Auto)


 0.0 x10^3/uL


(0.0-0.7) 


 


 





 


Basophils # (Auto)


 0.0 x10^3/uL


(0.0-0.2) 


 


 





 


Prothrombin Time


 39.9 SEC


(11.7-14.0) 


 


 





 


Prothromb Time International


Ratio 4.2 (0.8-1.1) 


 


 


 





 


Sodium Level


 136 mmol/L


(136-145) 


 


 





 


Potassium Level


 3.7 mmol/L


(3.5-5.1) 


 


 





 


Chloride Level


 102 mmol/L


() 


 


 





 


Carbon Dioxide Level


 26 mmol/L


(21-32) 


 


 





 


Anion Gap 8 (6-14)    


 


Blood Urea Nitrogen


 17 mg/dL


(7-20) 


 


 





 


Creatinine


 1.0 mg/dL


(0.6-1.0) 


 


 





 


Estimated GFR


(Cockcroft-Gault) 53.1 


 


 


 





 


BUN/Creatinine Ratio 17 (6-20)    


 


Glucose Level


 110 mg/dL


(70-99) 


 


 





 


Calcium Level


 7.7 mg/dL


(8.5-10.1) 


 


 





 


Iron Level


 15 ug/dL


() 


 


 





 


Total Iron Binding Capacity


 216 ug/dL


(250-450) 


 


 





 


Iron Saturation 7 % (15-34)    


 


Ferritin


 141 ng/mL


(8-252) 


 


 





 


Total Bilirubin


 0.5 mg/dL


(0.2-1.0) 


 


 





 


Aspartate Amino Transf


(AST/SGOT) 21 U/L (15-37) 


 


 


 





 


Alanine Aminotransferase


(ALT/SGPT) 27 U/L (14-59) 


 


 


 





 


Alkaline Phosphatase


 157 U/L


() 


 


 





 


Total Protein


 6.6 g/dL


(6.4-8.2) 


 


 





 


Albumin


 2.4 g/dL


(3.4-5.0) 


 


 





 


Albumin/Globulin Ratio 0.6 (1.0-1.7)    


 


Glucose (Fingerstick)


 


 98 mg/dL


(70-99) 96 mg/dL


(70-99) 





 


Urine Collection Type    Unknown 


 


Urine Color    Yellow 


 


Urine Clarity    Clear 


 


Urine pH    6.0 


 


Urine Specific Gravity    1.015 


 


Urine Protein


 


 


 


 Negative mg/dL


(NEG-TRACE)


 


Urine Glucose (UA)


 


 


 


 Negative mg/dL


(NEG)


 


Urine Ketones (Stick)


 


 


 


 Negative mg/dL


(NEG)


 


Urine Blood    Negative (NEG) 


 


Urine Nitrite    Negative (NEG) 


 


Urine Bilirubin    Negative (NEG) 


 


Urine Urobilinogen Dipstick


 


 


 


 0.2 mg/dL (0.2


mg/dL)


 


Urine Leukocyte Esterase    Negative (NEG) 


 


Urine RBC    Occ /HPF (0-2) 


 


Urine WBC    1-4 /HPF (0-4) 


 


Urine Squamous Epithelial


Cells 


 


 


 Few /LPF 





 


Urine Bacteria


 


 


 


 Few /HPF


(0-FEW)


 


Urine Mucus    Slight /LPF 


 


Test


 8/4/18


12:20 8/4/18


12:25 8/4/18


17:02 8/4/18


20:30


 


Prothrombin Time


 41.1 SEC


(11.7-14.0) 


 


 





 


Prothromb Time International


Ratio 4.4 (0.8-1.1) 


 


 


 





 


Glucose (Fingerstick)


 


 140 mg/dL


(70-99) 165 mg/dL


(70-99) 139 mg/dL


(70-99)


 


Test


 8/4/18


22:00 8/5/18


04:30 8/5/18


06:58 8/5/18


10:44


 


Stool Occult Blood Negative (NEG)    


 


White Blood Count


 


 6.0 x10^3/uL


(4.0-11.0) 


 





 


Red Blood Count


 


 2.82 x10^6/uL


(3.50-5.40) 


 





 


Hemoglobin


 


 7.8 g/dL


(12.0-15.5) 


 





 


Hematocrit


 


 23.5 %


(36.0-47.0) 


 





 


Mean Corpuscular Volume  84 fL ()   


 


Mean Corpuscular Hemoglobin  28 pg (25-35)   


 


Mean Corpuscular Hemoglobin


Concent 


 33 g/dL


(31-37) 


 





 


Red Cell Distribution Width


 


 19.4 %


(11.5-14.5) 


 





 


Platelet Count


 


 327 x10^3/uL


(140-400) 


 





 


Neutrophils (%) (Auto)  67 % (31-73)   


 


Lymphocytes (%) (Auto)  21 % (24-48)   


 


Monocytes (%) (Auto)  10 % (0-9)   


 


Eosinophils (%) (Auto)  1 % (0-3)   


 


Basophils (%) (Auto)  1 % (0-3)   


 


Neutrophils # (Auto)


 


 4.0 x10^3uL


(1.8-7.7) 


 





 


Lymphocytes # (Auto)


 


 1.2 x10^3/uL


(1.0-4.8) 


 





 


Monocytes # (Auto)


 


 0.6 x10^3/uL


(0.0-1.1) 


 





 


Eosinophils # (Auto)


 


 0.1 x10^3/uL


(0.0-0.7) 


 





 


Basophils # (Auto)


 


 0.0 x10^3/uL


(0.0-0.2) 


 





 


Prothrombin Time


 


 37.1 SEC


(11.7-14.0) 


 





 


Prothromb Time International


Ratio 


 3.9 (0.8-1.1) 


 


 





 


Glucose (Fingerstick)


 


 


 112 mg/dL


(70-99) 191 mg/dL


(70-99)








Laboratory Tests








Test


 8/4/18


12:20 8/4/18


12:25 8/4/18


17:02 8/4/18


20:30


 


Prothrombin Time


 41.1 SEC


(11.7-14.0) 


 


 





 


Prothromb Time International


Ratio 4.4 (0.8-1.1) 


 


 


 





 


Glucose (Fingerstick)


 


 140 mg/dL


(70-99) 165 mg/dL


(70-99) 139 mg/dL


(70-99)


 


Test


 8/4/18


22:00 8/5/18


04:30 8/5/18


06:58 8/5/18


10:44


 


Stool Occult Blood Negative (NEG)    


 


White Blood Count


 


 6.0 x10^3/uL


(4.0-11.0) 


 





 


Red Blood Count


 


 2.82 x10^6/uL


(3.50-5.40) 


 





 


Hemoglobin


 


 7.8 g/dL


(12.0-15.5) 


 





 


Hematocrit


 


 23.5 %


(36.0-47.0) 


 





 


Mean Corpuscular Volume  84 fL ()   


 


Mean Corpuscular Hemoglobin  28 pg (25-35)   


 


Mean Corpuscular Hemoglobin


Concent 


 33 g/dL


(31-37) 


 





 


Red Cell Distribution Width


 


 19.4 %


(11.5-14.5) 


 





 


Platelet Count


 


 327 x10^3/uL


(140-400) 


 





 


Neutrophils (%) (Auto)  67 % (31-73)   


 


Lymphocytes (%) (Auto)  21 % (24-48)   


 


Monocytes (%) (Auto)  10 % (0-9)   


 


Eosinophils (%) (Auto)  1 % (0-3)   


 


Basophils (%) (Auto)  1 % (0-3)   


 


Neutrophils # (Auto)


 


 4.0 x10^3uL


(1.8-7.7) 


 





 


Lymphocytes # (Auto)


 


 1.2 x10^3/uL


(1.0-4.8) 


 





 


Monocytes # (Auto)


 


 0.6 x10^3/uL


(0.0-1.1) 


 





 


Eosinophils # (Auto)


 


 0.1 x10^3/uL


(0.0-0.7) 


 





 


Basophils # (Auto)


 


 0.0 x10^3/uL


(0.0-0.2) 


 





 


Prothrombin Time


 


 37.1 SEC


(11.7-14.0) 


 





 


Prothromb Time International


Ratio 


 3.9 (0.8-1.1) 


 


 





 


Glucose (Fingerstick)


 


 


 112 mg/dL


(70-99) 191 mg/dL


(70-99)








Medications





Current Medications


Sodium Chloride 1,000 ml @  125 mls/hr 1X  ONCE IV  Last administered on 8/3/

18at 19:50;  Start 8/3/18 at 15:45;  Stop 8/3/18 at 23:44;  Status DC


Ceftriaxone Sodium 1 gm/ Dextrose 50 ml @  100 mls/hr Q24H IV ;  Start 8/3/18 

at 15:45;  Status UNV


Ceftriaxone Sodium (Rocephin) 1 gm Q24H IVP  Last administered on 8/4/18at 16:10

;  Start 8/3/18 at 17:00


Acetaminophen (Tylenol) 1,000 mg BID PO  Last administered on 8/5/18at 08:08;  

Start 8/3/18 at 21:00


Alprazolam (Xanax) 0.25 mg PRN Q6HRS  PRN PO ANXIETY / AGITATION Last 

administered on 8/4/18at 23:10;  Start 8/3/18 at 18:45


Carvedilol (Coreg) 3.125 mg BIDWMEALS PO  Last administered on 8/5/18at 08:08;  

Start 8/3/18 at 21:00


Cholestyramine Resin (Questran Light) 4 gm DAILY PO ;  Start 8/4/18 at 09:00


Citalopram Hydrobromide (CeleXA) 10 mg DAILY PO  Last administered on 8/5/18at 

08:08;  Start 8/4/18 at 09:00


Clopidogrel Bisulfate (Plavix) 75 mg DAILY PO  Last administered on 8/5/18at 08:

07;  Start 8/4/18 at 09:00


Diphenhydramine HCl (Benadryl) 50 mg QHS PO  Last administered on 8/4/18at 20:25

;  Start 8/3/18 at 21:00


Docusate Sodium (Colace) 100 mg DAILY PO  Last administered on 8/5/18at 08:08;  

Start 8/4/18 at 09:00


Insulin Glargine (Lantus) 20 units DAILYWBKFT SQ ;  Start 8/4/18 at 08:00


Isosorbide Mononitrate (Imdur) 30 mg DAILY PO  Last administered on 8/5/18at 08:

08;  Start 8/4/18 at 09:00


Lisinopril (Prinivil) 10 mg DAILY PO  Last administered on 8/5/18at 08:08;  

Start 8/4/18 at 09:00


Multivitamins/ Minerals (I-Josias) 1 tab BID PO  Last administered on 8/5/18at 08:

07;  Start 8/3/18 at 21:00


Non-Formulary Medication (Alendronate Sodium ) 70 mg WEEKLY PO ;  Start 8/10/18 

at 09:00;  Status UNV


Bumetanide (Bumex) 2 mg DAILY PO  Last administered on 8/5/18at 08:07;  Start 8/ 4/18 at 09:00


Insulin Human Lispro (HumaLOG) 5 units TIDWMEALS SQ  Last administered on 8/5/ 18at 11:26;  Start 8/4/18 at 08:00


Methotrexate (Rheumatrex) 15 mg WEEKLY PO ;  Start 8/9/18 at 09:00


Fish Oil (Fish Oil) 1,000 mg DAILY PO  Last administered on 8/5/18at 08:07;  

Start 8/4/18 at 09:00


Pantoprazole Sodium (Protonix) 40 mg DAILYAC PO  Last administered on 8/5/18at 

08:08;  Start 8/4/18 at 07:30


Simvastatin (Zocor) 40 mg QHS PO  Last administered on 8/4/18at 20:25;  Start 8/

3/18 at 21:00


Warfarin Sodium (Coumadin) 7.5 mg DAILY16 PO ;  Start 8/4/18 at 16:00;  Stop 8/5 /18 at 11:25;  Status DC


Warfarin Sodium (Coumadin Per Physician) 1 each PRN DAILY  PRN MC SEE COMMENTS;

  Start 8/3/18 at 20:00





Active Scripts


Active


Reported


Carvedilol 3.125 Mg Tablet 3.125 Mg PO BID


Methotrexate (Methotrexate Sodium) 2.5 Mg Tablet 6 Tab PO WEEKLY


Benadryl (Diphenhydramine Hcl) 25 Mg Capsule 2 Cap PO QHS


Bumetanide 2 Mg Tablet 2 Mg PO DAILY


Ocuvite Tablet (Vit A,C & E/Lutein/Minerals) 1 Each Tablet 1 Each PO BID


Prevalite Packet (Cholestyramine/Aspartame) 4 Gm Powd.pack 4 Gm PO 


Citalopram Hbr (Citalopram Hydrobromide) 10 Mg Tablet 10 Mg PO DAILY


Clopidogrel (Clopidogrel Bisulfate) 75 Mg Tablet 75 Mg PO DAILY


Alprazolam 0.25 Mg Tablet 0.25 Mg PO PRN Q6HRS PRN


Isosorbide Mononitrate Er (Isosorbide Mononitrate) 30 Mg Tab.er.24h 30 Mg PO 

DAILY


Prilosec Otc (Omeprazole Magnesium) 20 Mg Tablet.dr 20 Mg PO DAILY


Novolog Flexpen (Insulin Aspart) 100 Unit/1 Ml Insuln.pen 5 Unit SQ TIDBFRMEAL


Lantus Solostar (Insulin Glargine,Hum.rec.anlog) 100 Unit/1 Ml Insuln.pen 20 

Unit SQ DAILYWBKFT


Acetaminophen 500 Mg Tablet 2 Tab PO BID


Vitamin D-3 (Cholecalciferol (Vitamin D3)) 2,000 Unit Capsule 1,000 Unit PO 


Alendronate Sodium 70 Mg Tablet 70 Mg PO WEEKLY


Warfarin Sodium 5 Mg Tablet 1.5 Tab PO DAILY


Lisinopril 10 Mg Tablet 10 Mg PO DAILY


Simvastatin 40 Mg Tablet 40 Mg PO DAILY


Colace (Docusate Sodium) 100 Mg Capsule 100 Mg PO 


Garlic 1,000 Mg Capsule 1,000 Mg PO 


Multivitamins (Multivitamin) 1 Each Capsule 1 Each PO 


[place] 3 Fish Oil Softgel (Omega-3 Fatty Acids/Fish Oil) 1 Each Capsule.dr 1 

Each PO DAILY


Vitals/I & O





Vital Sign - Last 24 Hours








 8/4/18 8/4/18 8/4/18 8/4/18





 15:00 16:10 19:59 20:00


 


Temp 97.4  97.4 





 97.4  97.4 


 


Pulse 77 77 85 


 


Resp 28  20 


 


B/P (MAP) 115/40 (65) 115/40 133/55 (81) 


 


Pulse Ox 93  96 


 


O2 Delivery Room Air  Room Air Room Air


 


    





    





 8/4/18 8/5/18 8/5/18 8/5/18





 23:24 03:45 07:00 07:03


 


Temp 97.4 97.5 97.5 





 97.4 97.5 97.5 


 


Pulse 95 80 85 


 


Resp 20 16 16 


 


B/P (MAP) 147/48 (81) 137/51 (79) 113/62 (79) 


 


Pulse Ox 97 96 96 


 


O2 Delivery Nasal Cannula Nasal Cannula Nasal Cannula Room Air


 


O2 Flow Rate 2.0 2.0 2.0 


 


    





    





 8/5/18 8/5/18 8/5/18 8/5/18





 08:08 08:08 08:08 11:00


 


Temp    98.0





    98.0


 


Pulse 85 85 85 67


 


Resp    18


 


B/P (MAP) 113/62 113/62 113/62 114/57 (76)


 


Pulse Ox    96


 


O2 Delivery    Nasal Cannula


 


O2 Flow Rate    2.0














Intake and Output   


 


 8/4/18 8/4/18 8/5/18





 15:00 23:00 07:00


 


Intake Total  200 ml 240 ml


 


Balance  200 ml 240 ml








Assessment


MIHAELA


H/o Perla's, woefully overdue for surveillance.


H/o adenoms, same.


UTI/breathing issues.


Plan of Care:  Continue current Tx, Mgmt


Plan of Care Note


When feasible, merits vale-endoscopy.











TSEPHEN MCKENZIE MD Aug 5, 2018 11:40

## 2018-08-06 VITALS — DIASTOLIC BLOOD PRESSURE: 50 MMHG | SYSTOLIC BLOOD PRESSURE: 121 MMHG

## 2018-08-06 VITALS — DIASTOLIC BLOOD PRESSURE: 56 MMHG | SYSTOLIC BLOOD PRESSURE: 114 MMHG

## 2018-08-06 VITALS — SYSTOLIC BLOOD PRESSURE: 121 MMHG | DIASTOLIC BLOOD PRESSURE: 49 MMHG

## 2018-08-06 VITALS — SYSTOLIC BLOOD PRESSURE: 140 MMHG | DIASTOLIC BLOOD PRESSURE: 51 MMHG

## 2018-08-06 VITALS — SYSTOLIC BLOOD PRESSURE: 126 MMHG | DIASTOLIC BLOOD PRESSURE: 61 MMHG

## 2018-08-06 VITALS — SYSTOLIC BLOOD PRESSURE: 99 MMHG | DIASTOLIC BLOOD PRESSURE: 45 MMHG

## 2018-08-06 LAB
ANION GAP SERPL CALC-SCNC: 10 MMOL/L (ref 6–14)
BASOPHILS # BLD AUTO: 0.1 X10^3/UL (ref 0–0.2)
BASOPHILS NFR BLD: 1 % (ref 0–3)
BUN SERPL-MCNC: 15 MG/DL (ref 7–20)
CALCIUM SERPL-MCNC: 8.2 MG/DL (ref 8.5–10.1)
CHLORIDE SERPL-SCNC: 102 MMOL/L (ref 98–107)
CO2 SERPL-SCNC: 26 MMOL/L (ref 21–32)
CREAT SERPL-MCNC: 0.8 MG/DL (ref 0.6–1)
EOSINOPHIL NFR BLD: 0.1 X10^3/UL (ref 0–0.7)
EOSINOPHIL NFR BLD: 1 % (ref 0–3)
ERYTHROCYTE [DISTWIDTH] IN BLOOD BY AUTOMATED COUNT: 19.6 % (ref 11.5–14.5)
GFR SERPLBLD BASED ON 1.73 SQ M-ARVRAT: 68.7 ML/MIN
GLUCOSE SERPL-MCNC: 135 MG/DL (ref 70–99)
HCT VFR BLD CALC: 23.7 % (ref 36–47)
HGB BLD-MCNC: 7.8 G/DL (ref 12–15.5)
LYMPHOCYTES # BLD: 1.4 X10^3/UL (ref 1–4.8)
LYMPHOCYTES NFR BLD AUTO: 23 % (ref 24–48)
MCH RBC QN AUTO: 27 PG (ref 25–35)
MCHC RBC AUTO-ENTMCNC: 33 G/DL (ref 31–37)
MCV RBC AUTO: 83 FL (ref 79–100)
MONO #: 0.5 X10^3/UL (ref 0–1.1)
MONOCYTES NFR BLD: 9 % (ref 0–9)
NEUT #: 4 X10^3UL (ref 1.8–7.7)
NEUTROPHILS NFR BLD AUTO: 66 % (ref 31–73)
PLATELET # BLD AUTO: 338 X10^3/UL (ref 140–400)
POTASSIUM SERPL-SCNC: 3.8 MMOL/L (ref 3.5–5.1)
PROTHROMBIN TIME: 26.8 SEC (ref 11.7–14)
RBC # BLD AUTO: 2.85 X10^6/UL (ref 3.5–5.4)
SODIUM SERPL-SCNC: 138 MMOL/L (ref 136–145)
WBC # BLD AUTO: 6.1 X10^3/UL (ref 4–11)

## 2018-08-06 RX ADMIN — DOCUSATE SODIUM SCH MG: 100 CAPSULE, LIQUID FILLED ORAL at 08:34

## 2018-08-06 RX ADMIN — INSULIN LISPRO SCH UNITS: 100 INJECTION, SOLUTION INTRAVENOUS; SUBCUTANEOUS at 18:35

## 2018-08-06 RX ADMIN — BUMETANIDE SCH MG: 1 TABLET ORAL at 08:34

## 2018-08-06 RX ADMIN — INSULIN GLARGINE SCH UNITS: 100 INJECTION, SOLUTION SUBCUTANEOUS at 10:17

## 2018-08-06 RX ADMIN — I-VITE, TAB 1000-60-2MG (60/BT) SCH TAB: TAB at 20:42

## 2018-08-06 RX ADMIN — CITALOPRAM HYDROBROMIDE SCH MG: 10 TABLET ORAL at 08:34

## 2018-08-06 RX ADMIN — Medication SCH MG: at 08:34

## 2018-08-06 RX ADMIN — ACETAMINOPHEN SCH MG: 500 TABLET ORAL at 20:42

## 2018-08-06 RX ADMIN — CARVEDILOL SCH MG: 3.12 TABLET, FILM COATED ORAL at 08:36

## 2018-08-06 RX ADMIN — I-VITE, TAB 1000-60-2MG (60/BT) SCH TAB: TAB at 08:34

## 2018-08-06 RX ADMIN — ACETAMINOPHEN SCH MG: 500 TABLET ORAL at 08:34

## 2018-08-06 RX ADMIN — SIMVASTATIN SCH MG: 40 TABLET, FILM COATED ORAL at 20:42

## 2018-08-06 RX ADMIN — CEFTRIAXONE SCH GM: 1 INJECTION, POWDER, FOR SOLUTION INTRAMUSCULAR; INTRAVENOUS at 18:28

## 2018-08-06 RX ADMIN — CARVEDILOL SCH MG: 3.12 TABLET, FILM COATED ORAL at 18:27

## 2018-08-06 RX ADMIN — PANTOPRAZOLE SODIUM SCH MG: 40 TABLET, DELAYED RELEASE ORAL at 08:34

## 2018-08-06 RX ADMIN — LISINOPRIL SCH MG: 10 TABLET ORAL at 08:35

## 2018-08-06 RX ADMIN — INSULIN LISPRO SCH UNITS: 100 INJECTION, SOLUTION INTRAVENOUS; SUBCUTANEOUS at 08:00

## 2018-08-06 RX ADMIN — CLOPIDOGREL BISULFATE SCH MG: 75 TABLET ORAL at 08:34

## 2018-08-06 RX ADMIN — CHOLESTYRAMINE SCH GM: 4 POWDER, FOR SUSPENSION ORAL at 08:32

## 2018-08-06 RX ADMIN — ISOSORBIDE MONONITRATE SCH MG: 30 TABLET, EXTENDED RELEASE ORAL at 08:35

## 2018-08-06 RX ADMIN — INSULIN LISPRO SCH UNITS: 100 INJECTION, SOLUTION INTRAVENOUS; SUBCUTANEOUS at 12:19

## 2018-08-06 RX ADMIN — INSULIN LISPRO SCH UNITS: 100 INJECTION, SOLUTION INTRAVENOUS; SUBCUTANEOUS at 17:00

## 2018-08-06 NOTE — PN
DATE:  



SUBJECTIVE:  The patient is sleeping, but awakens easily.  States she is still

short of breath with any sort of exertion, but is good at rest.  She denies any

chest pain or feeling of palpitations.



OBJECTIVE:

VITAL SIGNS:  Stable.  She is afebrile.

CHEST:  Clear.

HEART:  Irregular.

ABDOMEN:  Benign.

EXTREMITIES:  Without significant edema.



LABORATORY DATA:  Hemoglobin is stable at 7.8.  Sugars have been good.  INR has

fallen to 2.6.



ASSESSMENT:

1.  Shortness of breath, ongoing, multifactorial with iron deficiency anemia as

well as AFib with a rapid ventricular response with any sort of activity.

2.  Diabetes.

3.  Coagulopathy, improving.



PLAN:  We will continue to hold Coumadin at this point in time and await

Cardiology evaluation to try to deal with the AFib issues.  GI may indeed do

panendoscopy prior to discharge with iron deficiency anemia, although she has

had heme-negative stools during this admission.

 



______________________________

MARSHALL RICHARDS MD



DR:  [email or web address]  JOB#:  [id number] / [id number]

DD:  08/06/2018 08:32  DT:  08/06/2018 18:28

## 2018-08-06 NOTE — PDOC2
MALIKA REYES APRN 8/6/18 1052:


CARDIAC CONSULT


DATE OF CONSULT


Date of Consult


DATE: 8/6/18 


TIME: 10:45





REASON FOR CONSULT


Reason for Consult:


afib, rvr, chf





REFERRING PHYSICIAN


Referring Physician:


appl





SOURCE


Source:  Caregiver, Patient (who is a poor historian)





HISTORY OF PRESENT ILLNESS


HISTORY OF PRESENT ILLNESS


82 year old female admitted from unknown location fo anemia.  EKG demonstrates 

atrial fibrillation. C/O chest pain in the lower sternal region not clear 

associated with exertion and without associated symptoms.  Has not seen usual 

cardiologist @ [facility] in 2+ years. Anticoagulated with therapeutic INR POA.  

Hypokalemic POA; Mg level not evaluated.


Reason for Visit:  atrial fib, CP





PAST MEDICAL HISTORY


Cardiovascular:  CAD, HTN, Hyperlipidemia


CENTRAL NERVOUS SYSTEM:  CVA, TIA


GI:  GERD


Heme/Onc:  Cancer (breast)


Musculoskeletal:  Osteoarthritis


Rheumatologic:  Rheumatoid arthritis


Endocrine:  Diabetes





PAST SURGICAL HISTORY


Past Surgical History:  CABG (details unknown ), Total knee replacement (

bilateral )





FAMILY HISTORY


Family History:  Family History Unknown





SOCIAL HISTORY


Smoke:  Quit


ALCOHOL:  other (daily beer )


Drugs:  None





CURRENT MEDICATIONS


CURRENT MEDICATIONS





Current Medications








 Medications


  (Trade)  Dose


 Ordered  Sig/Gerry


 Route


 PRN Reason  Start Time


 Stop Time Status Last Admin


Dose Admin


 


 Furosemide


  (Lasix)  40 mg  1X  ONCE


 IVP


   8/5/18 12:00


 8/5/18 12:01 DC 8/5/18 12:05














ALLERGIES


ALLERGIES:  


Coded Allergies:  


     milk (Verified  Allergy, Intermediate, 8/3/18)


     montelukast (Verified  Allergy, Intermediate, 8/3/18)





ROS


General:  No: Chills, Night Sweats, Fatigue, Malaise, Appetite, Other


PSYCHOLOGICAL ROS:  No: Anxiety, Behavioral Disorder, Concentration difficultie

, Decreased libido, Depression, Disorientation, Hallucinations, Hostility, 

Irritablity, Memory difficulties, Mood Swings, Obsessive thoughts, Physical 

abuse, Sexual abuse, Sleep disturbances, Suicidal ideation, Other


Eyes:  No Blurry vision, No Decreased vision, No Double vision, No Dry eyes, No 

Excessive tearing, No Eye Pain, No Itchy Eyes, No Loss of vision, No Photophobia

, No Scotomata, No Uses contacts, No Uses glasses, No Other


HEENT:  No: Heacaches, Visual Changes, Hearing change, Nasal congestion, Nasal 

discharge, Oral lesions, Sinus pain, Sore Throat, Epistaxis, Sneezing, Snoring, 

Tinnitus, Vertigo, Vocal changes, Other


ALLERGY AND IMMUNOLOGY:  No: Hives, Insect Bite Sensitivity, Itchy/Watery Eyes, 

Nasal Congestion, Post Nasal Drip, Seasonal Allergies, Other


Hematological and Lymphatic:  No: Bleeding Problems, Blood Clots, Blood 

Transfusions, Brusing, Night Sweats, Pallor, Swollen Lymph Nodes, Other


ENDOCRINE:  No: Breast Changes, Galactorrhea, Hair Pattern Changes, Hot Flashes

, Malaise/lethargy, Mood Swings, Palpitations, Polydipsia/polyuria, Skin Changes

, Temperature Intolerance, Unexpected Weight Changes, Other


Respiratory:  No: Cough, Hemoptysis, Orthopnea, Pleuritic Pain, Shortness of 

breath, SOB with excertion, Sputum Changes, Stridor, Tachypnea, Wheezing, Other


Cardiovascular:  yes Chest Pain; 


   No Palpitations, No Orthopnea, No Paroxysmal Noc. Dyspnea, No Edema, No Lt 

Headedness, No Other


Gastrointestinal:  No Nausea, No Vomiting, No Abdominal Pain, No Diarrhea, No 

Constipation, No Melena, No Hematochezia, No Other


Genitourinary:  No Dysuria, No Frequency, No Incontinence, No Hematuria, No 

Retention, No Discharge, No Urgency, No Pain, No Flank Pain, No Other


Musculoskeletal:  No Gait Disturbance, No Joint Pain, No Joint Stiffness, No 

Joint Swelling, No Muscle Pain, No Muscular Weakness, No Pain In:, No Swelling 

In:, No Other


Neurological:  No Behavorial Changes, No Bowel/Bladder ControlChng, No Confusion

, No Dizziness, No Gait Disturbance, No Headaches, No Impaired Coord/balance, 

No Memory Loss, No Numbness/Tingling, No Seizures, No Speech Problems, No 

Tremors, No Visual Changes, No Weakness, No Other


Skin:  No Dry Skin, No Eczema, No Hair Changes, No Lumps, No Mole Changes, No 

Mottling, No Nail Changes, No Pruritus, No Rash, No Skin Lesion Changes, No 

Other, No Acne





PHYSICAL EXAM


General:  Alert, Cooperative


HEENT:  Atraumatic


Lungs:  Clear to auscultation


Heart:  Normal S1, Normal S2, Other (IRRR)


Abdomen:  Soft


Extremities:  Normal pulses


Skin:  No rashes


Neuro:  Normal speech


Psych/Mental Status:  Mood NL


MUSCULOSKELETAL:  Osteoarthritic changes both hands





VITALS


VITALS





Vital Signs








  Date Time  Temp Pulse Resp B/P (MAP) Pulse Ox O2 Delivery O2 Flow Rate FiO2


 


8/6/18 08:36  93  114/56    


 


8/6/18 08:00      Nasal Cannula 2.0 


 


8/6/18 07:00 98.1  22  93   





 98.1       











LABS


Lab:





Laboratory Tests








Test


 8/5/18


16:40 8/5/18


21:37 8/6/18


04:50 8/6/18


06:59


 


Glucose (Fingerstick)


 137 mg/dL


(70-99) 174 mg/dL


(70-99) 


 126 mg/dL


(70-99)


 


White Blood Count


 


 


 6.1 x10^3/uL


(4.0-11.0) 





 


Red Blood Count


 


 


 2.85 x10^6/uL


(3.50-5.40) 





 


Hemoglobin


 


 


 7.8 g/dL


(12.0-15.5) 





 


Hematocrit


 


 


 23.7 %


(36.0-47.0) 





 


Mean Corpuscular Volume   83 fL ()  


 


Mean Corpuscular Hemoglobin   27 pg (25-35)  


 


Mean Corpuscular Hemoglobin


Concent 


 


 33 g/dL


(31-37) 





 


Red Cell Distribution Width


 


 


 19.6 %


(11.5-14.5) 





 


Platelet Count


 


 


 338 x10^3/uL


(140-400) 





 


Neutrophils (%) (Auto)   66 % (31-73)  


 


Lymphocytes (%) (Auto)   23 % (24-48)  


 


Monocytes (%) (Auto)   9 % (0-9)  


 


Eosinophils (%) (Auto)   1 % (0-3)  


 


Basophils (%) (Auto)   1 % (0-3)  


 


Neutrophils # (Auto)


 


 


 4.0 x10^3uL


(1.8-7.7) 





 


Lymphocytes # (Auto)


 


 


 1.4 x10^3/uL


(1.0-4.8) 





 


Monocytes # (Auto)


 


 


 0.5 x10^3/uL


(0.0-1.1) 





 


Eosinophils # (Auto)


 


 


 0.1 x10^3/uL


(0.0-0.7) 





 


Basophils # (Auto)


 


 


 0.1 x10^3/uL


(0.0-0.2) 





 


Prothrombin Time


 


 


 26.8 SEC


(11.7-14.0) 





 


Prothromb Time International


Ratio 


 


 2.6 (0.8-1.1) 


 





 


Sodium Level


 


 


 138 mmol/L


(136-145) 





 


Potassium Level


 


 


 3.8 mmol/L


(3.5-5.1) 





 


Chloride Level


 


 


 102 mmol/L


() 





 


Carbon Dioxide Level


 


 


 26 mmol/L


(21-32) 





 


Anion Gap   10 (6-14)  


 


Blood Urea Nitrogen


 


 


 15 mg/dL


(7-20) 





 


Creatinine


 


 


 0.8 mg/dL


(0.6-1.0) 





 


Estimated GFR


(Cockcroft-Gault) 


 


 68.7 


 





 


Glucose Level


 


 


 135 mg/dL


(70-99) 





 


Calcium Level


 


 


 8.2 mg/dL


(8.5-10.1) 





 


Test


 8/6/18


09:30 


 


 





 


Lactic Acid Level


 0.7 mmol/L


(0.4-2.0) 


 


 














IMAGES


IMAGES


CXR: 





 


Mild prominent appearing bilateral interstitial lung markings likely 


congestive changes..





ASSESSMENT/PLAN


ASSESSMENT/PLAN


1.  atrial fibrillation 


   --occurring in the setting of hypokalemia and likely hypomagnesemia; suspect 

this is not new as she is anticoagulated


   --replace elytes; recheck labs in a.m. 


   --TTE to evaluate for valvular disease


   --request records from 





2.  stroke/TIA history


   --continue Plavix





3.  HTN


   --control with meds





TAMIA MARAVILLA MD 8/6/18 1939:


CARDIAC CONSULT


ASSESSMENT/PLAN


ASSESSMENT/PLAN


Pt. seen and examined. Agree with above NP note with following comments. 


Presenting with anemia. Likely chronic afib. 


Echo reviewed - she has severe LV dysfunction. will need to compare to  

records. 


No chest pain. with anemia, poor cath candidate. 


COnsider outpt ICD based on goals of care. 


Continue excellent HF med regimen. Thanks. Will follow.











MALIKA REYES Aug 6, 2018 10:52


TAMIA MARAVILLA MD Aug 6, 2018 19:39

## 2018-08-06 NOTE — PDOC
Subjective:


Subjective:


Just feels "pooped" today.


Not much appetite today but ate well yesterday.





Objective:


Vital Signs:





 Vital Signs








  Date Time  Temp Pulse Resp B/P (MAP) Pulse Ox O2 Delivery O2 Flow Rate FiO2


 


8/6/18 11:00 96.4 84 20 121/50 (73) 96 Nasal Cannula 2.0 





 96.4       








Labs:





Laboratory Tests








Test


 8/5/18


16:40 8/5/18


21:37 8/6/18


04:50 8/6/18


06:59


 


Glucose (Fingerstick) 137 mg/dL  174 mg/dL   126 mg/dL 


 


White Blood Count   6.1 x10^3/uL  


 


Red Blood Count   2.85 x10^6/uL  


 


Hemoglobin   7.8 g/dL  


 


Hematocrit   23.7 %  


 


Mean Corpuscular Volume   83 fL  


 


Mean Corpuscular Hemoglobin   27 pg  


 


Mean Corpuscular Hemoglobin


Concent 


 


 33 g/dL 


 





 


Red Cell Distribution Width   19.6 %  


 


Platelet Count   338 x10^3/uL  


 


Neutrophils (%) (Auto)   66 %  


 


Lymphocytes (%) (Auto)   23 %  


 


Monocytes (%) (Auto)   9 %  


 


Eosinophils (%) (Auto)   1 %  


 


Basophils (%) (Auto)   1 %  


 


Neutrophils # (Auto)   4.0 x10^3uL  


 


Lymphocytes # (Auto)   1.4 x10^3/uL  


 


Monocytes # (Auto)   0.5 x10^3/uL  


 


Eosinophils # (Auto)   0.1 x10^3/uL  


 


Basophils # (Auto)   0.1 x10^3/uL  


 


Prothrombin Time   26.8 SEC  


 


Prothromb Time International


Ratio 


 


 2.6 


 





 


Sodium Level   138 mmol/L  


 


Potassium Level   3.8 mmol/L  


 


Chloride Level   102 mmol/L  


 


Carbon Dioxide Level   26 mmol/L  


 


Anion Gap   10  


 


Blood Urea Nitrogen   15 mg/dL  


 


Creatinine   0.8 mg/dL  


 


Estimated GFR


(Cockcroft-Gault) 


 


 68.7 


 





 


Glucose Level   135 mg/dL  


 


Calcium Level   8.2 mg/dL  


 


Magnesium Level   1.2 mg/dL  


 


Test


 8/6/18


09:30 8/6/18


10:23 


 





 


Lactic Acid Level 0.7 mmol/L    


 


Glucose (Fingerstick)  112 mg/dL   











PE:





GEN: NAD, doing crossword puzzle in chair


LUNGS: NC


HEART: RR


ABD: soft, non-tender


NEURO/PSYCH: A & O 3





A/P:


MIHAELA


H/o Perla's and adenomatous colon polyps - on PPI, overdue for EGD/colon


A Fib, coagulopathy - INR 2.6





--


Needs EGD and colonoscopy eventually.


Continue PPI.











JOSE FOURNIER Aug 6, 2018 13:26

## 2018-08-07 VITALS — DIASTOLIC BLOOD PRESSURE: 57 MMHG | SYSTOLIC BLOOD PRESSURE: 126 MMHG

## 2018-08-07 VITALS — DIASTOLIC BLOOD PRESSURE: 48 MMHG | SYSTOLIC BLOOD PRESSURE: 130 MMHG

## 2018-08-07 VITALS — SYSTOLIC BLOOD PRESSURE: 122 MMHG | DIASTOLIC BLOOD PRESSURE: 46 MMHG

## 2018-08-07 VITALS — DIASTOLIC BLOOD PRESSURE: 57 MMHG | SYSTOLIC BLOOD PRESSURE: 134 MMHG

## 2018-08-07 VITALS — DIASTOLIC BLOOD PRESSURE: 51 MMHG | SYSTOLIC BLOOD PRESSURE: 138 MMHG

## 2018-08-07 VITALS — DIASTOLIC BLOOD PRESSURE: 47 MMHG | SYSTOLIC BLOOD PRESSURE: 141 MMHG

## 2018-08-07 LAB
ANION GAP SERPL CALC-SCNC: 6 MMOL/L (ref 6–14)
BUN SERPL-MCNC: 13 MG/DL (ref 7–20)
CALCIUM SERPL-MCNC: 8.2 MG/DL (ref 8.5–10.1)
CHLORIDE SERPL-SCNC: 103 MMOL/L (ref 98–107)
CO2 SERPL-SCNC: 29 MMOL/L (ref 21–32)
CREAT SERPL-MCNC: 0.8 MG/DL (ref 0.6–1)
GFR SERPLBLD BASED ON 1.73 SQ M-ARVRAT: 68.7 ML/MIN
GLUCOSE SERPL-MCNC: 116 MG/DL (ref 70–99)
MAGNESIUM SERPL-MCNC: 2 MG/DL (ref 1.8–2.4)
POTASSIUM SERPL-SCNC: 4.3 MMOL/L (ref 3.5–5.1)
PROTHROMBIN TIME: 19.5 SEC (ref 11.7–14)
SODIUM SERPL-SCNC: 138 MMOL/L (ref 136–145)

## 2018-08-07 RX ADMIN — I-VITE, TAB 1000-60-2MG (60/BT) SCH TAB: TAB at 08:18

## 2018-08-07 RX ADMIN — BUMETANIDE SCH MG: 1 TABLET ORAL at 08:17

## 2018-08-07 RX ADMIN — CITALOPRAM HYDROBROMIDE SCH MG: 10 TABLET ORAL at 08:19

## 2018-08-07 RX ADMIN — PANTOPRAZOLE SODIUM SCH MG: 40 TABLET, DELAYED RELEASE ORAL at 08:18

## 2018-08-07 RX ADMIN — CLOPIDOGREL BISULFATE SCH MG: 75 TABLET ORAL at 08:17

## 2018-08-07 RX ADMIN — DOCUSATE SODIUM SCH MG: 100 CAPSULE, LIQUID FILLED ORAL at 08:26

## 2018-08-07 RX ADMIN — ACETAMINOPHEN SCH MG: 500 TABLET ORAL at 08:27

## 2018-08-07 RX ADMIN — LISINOPRIL SCH MG: 10 TABLET ORAL at 08:29

## 2018-08-07 RX ADMIN — INSULIN GLARGINE SCH UNITS: 100 INJECTION, SOLUTION SUBCUTANEOUS at 08:15

## 2018-08-07 RX ADMIN — CARVEDILOL SCH MG: 3.12 TABLET, FILM COATED ORAL at 17:53

## 2018-08-07 RX ADMIN — CHOLESTYRAMINE SCH GM: 4 POWDER, FOR SUSPENSION ORAL at 08:26

## 2018-08-07 RX ADMIN — SIMVASTATIN SCH MG: 40 TABLET, FILM COATED ORAL at 20:29

## 2018-08-07 RX ADMIN — Medication SCH MG: at 08:17

## 2018-08-07 RX ADMIN — ACETAMINOPHEN SCH MG: 500 TABLET ORAL at 08:18

## 2018-08-07 RX ADMIN — ACETAMINOPHEN SCH MG: 500 TABLET ORAL at 20:29

## 2018-08-07 RX ADMIN — INSULIN LISPRO SCH UNITS: 100 INJECTION, SOLUTION INTRAVENOUS; SUBCUTANEOUS at 18:03

## 2018-08-07 RX ADMIN — CARVEDILOL SCH MG: 3.12 TABLET, FILM COATED ORAL at 08:18

## 2018-08-07 RX ADMIN — I-VITE, TAB 1000-60-2MG (60/BT) SCH TAB: TAB at 20:29

## 2018-08-07 RX ADMIN — INSULIN LISPRO SCH UNITS: 100 INJECTION, SOLUTION INTRAVENOUS; SUBCUTANEOUS at 12:07

## 2018-08-07 RX ADMIN — CEFTRIAXONE SCH GM: 1 INJECTION, POWDER, FOR SOLUTION INTRAMUSCULAR; INTRAVENOUS at 18:06

## 2018-08-07 RX ADMIN — ISOSORBIDE MONONITRATE SCH MG: 30 TABLET, EXTENDED RELEASE ORAL at 08:19

## 2018-08-07 RX ADMIN — DOCUSATE SODIUM SCH MG: 100 CAPSULE, LIQUID FILLED ORAL at 08:19

## 2018-08-07 NOTE — PDOC
Subjective:


Subjective:


Some shortness of breath today, just not feeling the best.


Appetite is better.  Stooled twice today.


Was told she'd probably have a colonoscopy tomorrow - wants low-volume prep.





Objective:


Objective:


D/w Shelley/cardiology - trying to get outside records, A Fib probably not new 

but EF 30-35%, valvular disease - consider cath? (though poor candidate w/ 

anemia)


Vital Signs:





 Vital Signs








  Date Time  Temp Pulse Resp B/P (MAP) Pulse Ox O2 Delivery O2 Flow Rate FiO2


 


8/7/18 11:00 98.0 91 16 122/46 (71) 93 Room Air  





 98.0       


 


8/6/18 20:00       2.0 








Labs:





Laboratory Tests








Test


 8/6/18


16:30 8/6/18


18:18 8/6/18


20:56 8/7/18


03:00


 


Glucose (Fingerstick) 65 mg/dL  180 mg/dL  117 mg/dL  


 


Prothrombin Time    19.5 SEC 


 


Prothromb Time International


Ratio 


 


 


 1.7 





 


Sodium Level    138 mmol/L 


 


Potassium Level    4.3 mmol/L 


 


Chloride Level    103 mmol/L 


 


Carbon Dioxide Level    29 mmol/L 


 


Anion Gap    6 


 


Blood Urea Nitrogen    13 mg/dL 


 


Creatinine    0.8 mg/dL 


 


Estimated GFR


(Cockcroft-Gault) 


 


 


 68.7 





 


Glucose Level    116 mg/dL 


 


Calcium Level    8.2 mg/dL 


 


Magnesium Level    2.0 mg/dL 


 


Test


 8/7/18


07:19 8/7/18


10:33 


 





 


Glucose (Fingerstick) 114 mg/dL  209 mg/dL   








Imaging:


Echo


<Conclusion>


Left ventricle systolic function is moderately to severely impaired. The 

Ejection Fraction is 30-35%.


Septal motion consistent with post-operative state. Moderate to severe global 

hypokinesis.


The right ventricular systolic function is mild diminished.


Doppler and Color Flow revealed moderate aortic regurgitation.


Doppler and Color-flow revealed moderate mitral regurgitation.


Doppler and Color Flow revealed moderate tricuspid regurgitation.The PA 

pressure was estimated at 53 mmHg.





PE:





GEN: NAD, up to chair


LUNGS: clear anteriorly


HEART: irregularly irregular


ABD: NABS, S/ND/NT


NEURO/PSYCH: A & O 3





A/P:


MIHAELA w/ h/o Perla's and adenomatous colon polyps - on PPI, overdue for 

surveillance 


LV dysfunction - new?


A Fib, coagulopathy - INR now 1.7, Warfarin and Plavix held





--


Will review timing of EGD/colon w/ Dr. Zimmer.











JOSE FOURNIER Aug 7, 2018 11:40

## 2018-08-07 NOTE — CARD
MR#: [id number]

Account#: [id number]

Accession#: [id number]

Date of Study: 08/06/2018

Ordering Physician: MALIKA REYES, 

Referring Physician: MARSHALL RICHARDS 

Tech: Roula Goldstein RDCS





--------------- APPROVED REPORT --------------





EXAM: Two-dimensional and M-mode echocardiogram with Doppler and color Doppler.



Other Information 

HR: 87bpm

Rhythm : Atrial Fibrillation



INDICATION

Atrial Fibrillation



RISK FACTORS

Obesity   



2D DIMENSIONS 

Left Atrium(2D)3.7 (1.6-4.0cm)IVSd1.4 (0.7-1.1cm)

Aortic Root(2D)3.2 (2.0-3.7cm)LVDd5.6 (3.9-5.9cm)

LVOT Diameter2.0 (1.8-2.4cm)PWd1.0 (0.7-1.1cm)

LVDs5.1 (2.5-4.0cm)FS (%) 8.0 %

SV26.8 mlLVEF(%)32.0 (>50%)



Aortic Valve

AoV Peak Carlos.148.0cm/sAoV VTI29.5cm

AO Peak GR.8.8mmHgLVOT Peak Carlos.103.3cm/s

LVOT  VTI 20.01cmAO Mean GR.5mmHg

MAURIZIO (VMAX)1.35zs3RLS   (VTI)2.20cm2



Mitral Valve

MV E Peak Gr.94mmHgMV DECEL [id number]

MV YKX20sjAVS (PHT)3.74cm2



Pulmonary Valve

PV Peak Brnrchme39.9cm/sPV Peak Grad.4mmHg



Tricuspid Valve

TR P. Levroyoi347lh/sRAP [id number]

TR Peak Gr.38mmHg



 LEFT VENTRICLE 

The left ventricle is mildly dilated. Proximal septal thickening is noted. Left ventricle systolic fu
nction is moderately to severely impaired. The Ejection Fraction is 30-35%. Septal motion consistent 
with post-operative state. Moderate to severe global hypokinesis. Tissue Doppler imaging reveals mode
rate left ventricular diastolic dysfunction.



 RIGHT VENTRICLE 

The right ventricle is normal size. There is normal right ventricular wall thickness. The right ventr
icular systolic function is mild diminished. 



 ATRIA 

The left atrium size is normal. The right atrium size is normal. The interatrial septum is intact wit
h no evidence for an atrial septal defect or patent foramen ovale as noted on 2-D or Doppler imaging.




 AORTIC VALVE 

The aortic valve is calcified and grossly trileaflet. Doppler and Color Flow revealed moderate aortic
 regurgitation. There is no significant aortic valvular stenosis.



 MITRAL VALVE 

Moderate mitral annular calcification. There is no mitral valve stenosis. Doppler and Color-flow reve
aled moderate mitral regurgitation.



 TRICUSPID VALVE 

The tricuspid valve is normal in structure and function. Doppler and Color Flow revealed moderate tri
cuspid regurgitation.The PA pressure was estimated at 53 mmHg. There is no tricuspid valve stenosis.



 PULMONIC VALVE 

The pulmonic valve is not well visualized. Doppler and Color Flow revealed trace pulmonic valvular re
gurgitation. There is no pulmonic valvular stenosis.



 GREAT VESSELS 

The aortic root is normal in size. The IVC is dilated and collapses <50% with inspiration.



 PERICARDIAL EFFUSION 

There is no evidence of significant pericardial effusion.



Critical Notification

Critical Value: No



<Conclusion>

Left ventricle systolic function is moderately to severely impaired. The Ejection Fraction is 30-35%.


Septal motion consistent with post-operative state. Moderate to severe global hypokinesis.

The right ventricular systolic function is mild diminished.

Doppler and Color Flow revealed moderate aortic regurgitation.

Doppler and Color-flow revealed moderate mitral regurgitation.

Doppler and Color Flow revealed moderate tricuspid regurgitation.The PA pressure was estimated at 53 
mmHg.



Signed by : Hugh Rogers, 

Electronically Approved : 08/06/2018 19:26:46

## 2018-08-07 NOTE — PN
DATE:  08/07/2018



LOCATION:  Room 648.



SUBJECTIVE:  The patient is awake, alert, sitting in chair.  She is waiting to

eat her breakfast this morning due to not receiving pills yet.  She states she

is still short of breath with going to the bathroom, but no problems at rest. 

She denies any chest pain or feelings of palpitation once again.



OBJECTIVE:

VITAL SIGNS:  Stable.  She is afebrile.

CHEST:  Clear.

HEART:  Irregular.

ABDOMEN:  Benign.

EXTREMITIES:  Without significant edema.



LABORATORY DATA:  Sugars have been good, morning BMP is good.  INR is down to

1.7 this morning.  Echo yesterday shows an ejection fraction of 30%-35%.



IMPRESSION:

1.  Shortness of breath ongoing, multifactorial with iron deficiency anemia, as

well as congestive heart failure.

2.  Atrial fibrillation with rapid ventricular response with activity.

3.  Cardiomyopathy.

4.  Diabetes.

5.  Coagulopathy, improving.



PLAN:  We will continue to hold Coumadin and I have instructed nursing to hold

Plavix.  Hopefully, able to do panendoscopy tomorrow for the iron deficiency

anemia prior to considering discharge.  We will await final Cardiology

suggestions regarding any further medicine changes, etc.

 



______________________________

MARSHALL RICHARDS MD



DR:  VENKATA/tu  JOB#:  [id number] / [id number]

DD:  08/07/2018 08:52  DT:  08/07/2018 11:40

## 2018-08-07 NOTE — PDOC
MALIKA REYES APRN 8/7/18 1522:


CARDIO Progress Notes


Date and Time


Date of Service


08/07/2018


Time of Evaluation


initial evaluation about 1030


returned after evaluation of outside records





Subjective


Subjective:  No Chest Pain, No shortness of breath, No Palpitations, No 

Dizziness





Vitals


Vitals





Vital Signs








  Date Time  Temp Pulse Resp B/P (MAP) Pulse Ox O2 Delivery O2 Flow Rate FiO2


 


8/7/18 11:00 98.0 91 16 122/46 (71) 93 Room Air  





 98.0       


 


8/6/18 20:00       2.0 








Weight


Weight [ ]





Input and Output


Intake and Output











Intake and Output 


 


 8/7/18





 07:00


 


Intake Total 970 ml


 


Balance 970 ml


 


 


 


Intake Oral 970 ml


 


# Voids 6











Laboratory


Labs





Laboratory Tests








Test


 8/6/18


16:30 8/6/18


18:18 8/6/18


20:56 8/7/18


03:00


 


Glucose (Fingerstick)


 65 mg/dL


(70-99) 180 mg/dL


(70-99) 117 mg/dL


(70-99) 





 


Prothrombin Time


 


 


 


 19.5 SEC


(11.7-14.0)


 


Prothromb Time International


Ratio 


 


 


 1.7 (0.8-1.1) 





 


Sodium Level


 


 


 


 138 mmol/L


(136-145)


 


Potassium Level


 


 


 


 4.3 mmol/L


(3.5-5.1)


 


Chloride Level


 


 


 


 103 mmol/L


()


 


Carbon Dioxide Level


 


 


 


 29 mmol/L


(21-32)


 


Anion Gap    6 (6-14) 


 


Blood Urea Nitrogen


 


 


 


 13 mg/dL


(7-20)


 


Creatinine


 


 


 


 0.8 mg/dL


(0.6-1.0)


 


Estimated GFR


(Cockcroft-Gault) 


 


 


 68.7 





 


Glucose Level


 


 


 


 116 mg/dL


(70-99)


 


Calcium Level


 


 


 


 8.2 mg/dL


(8.5-10.1)


 


Magnesium Level


 


 


 


 2.0 mg/dL


(1.8-2.4)


 


Test


 8/7/18


07:19 8/7/18


10:33 


 





 


Glucose (Fingerstick)


 114 mg/dL


(70-99) 209 mg/dL


(70-99) 


 














Microbiology


Micro





Microbiology


8/6/18 Blood Culture - Preliminary, Resulted


         NO GROWTH AFTER 1 DAY





Physical Exam


HEENT:  Neck Supple W Full Motion


Chest:  Symmetric


LUNGS:  Clear to Auscultation


Heart:  murmurs (2/6LSB), other (IRRR; tele: atrial fib)


Abdomen:  Soft N/T


Neurology:  alert, oriented





Assessment


Assessment


1.  atrial fibrillation 


   --occurring in the setting of hypokalemia and hypomagnesemia; suspect this 

is not new as she is anticoagulated


   --K and Mg WNL today


   --rate controlled


   --TTE with depressed LVEF of 30-35%; TTE done 5/2017 with EF of 35-40% and 

noted some improvement since 6/2016; unfortunately no other records received


   --OAC on hold for GI evaluation 





2.  stroke/TIA history


   --continue Plavix





3.  cardiomyopathy, presumed ischemic


   --continue BB, ACEI, and long-acting nitrates


   --consider diuretics & alpha agonist in the outpatient setting 


   --discuss ICD in outpatient setting


   --as depressed LVEF not a new finding, would not pursue further evaluation 

at this point





4.  CAD with previous CABG


   --details unknown





5.  HTN


   --control with meds





6.  HLD


   --check FLP 


   --continue statin therapy





7.  anemia


   --GI evaluation in progress





TAMIA MARAVILLA MD 8/7/18 1549:


CARDIO Progress Notes


Plan


Plan


Agree with above nurse practitioner note. Continue GI evaluation. She'll follow-

up in the heart failure clinic in the near future.











MALIKA REYES Aug 7, 2018 15:22


TAMIA MARAVILLA MD Aug 7, 2018 15:49

## 2018-08-08 VITALS — DIASTOLIC BLOOD PRESSURE: 81 MMHG | SYSTOLIC BLOOD PRESSURE: 119 MMHG

## 2018-08-08 VITALS — SYSTOLIC BLOOD PRESSURE: 136 MMHG | DIASTOLIC BLOOD PRESSURE: 53 MMHG

## 2018-08-08 VITALS — DIASTOLIC BLOOD PRESSURE: 45 MMHG | SYSTOLIC BLOOD PRESSURE: 136 MMHG

## 2018-08-08 VITALS — DIASTOLIC BLOOD PRESSURE: 43 MMHG | SYSTOLIC BLOOD PRESSURE: 126 MMHG

## 2018-08-08 VITALS — SYSTOLIC BLOOD PRESSURE: 142 MMHG | DIASTOLIC BLOOD PRESSURE: 47 MMHG

## 2018-08-08 VITALS — SYSTOLIC BLOOD PRESSURE: 141 MMHG | DIASTOLIC BLOOD PRESSURE: 57 MMHG

## 2018-08-08 LAB
CHOLEST SERPL-MCNC: 69 MG/DL (ref 0–200)
CHOLEST/HDLC SERPL: 3.5 {RATIO}
HDLC SERPL-MCNC: 20 MG/DL (ref 40–60)
LDLC: 33 MG/DL (ref 0–100)
PROTHROMBIN TIME: 17.4 SEC (ref 11.7–14)
TRIGL SERPL-MCNC: 80 MG/DL (ref 0–150)
VLDLC: 16 MG/DL (ref 0–40)

## 2018-08-08 RX ADMIN — SIMVASTATIN SCH MG: 40 TABLET, FILM COATED ORAL at 21:26

## 2018-08-08 RX ADMIN — INSULIN LISPRO SCH UNITS: 100 INJECTION, SOLUTION INTRAVENOUS; SUBCUTANEOUS at 09:20

## 2018-08-08 RX ADMIN — CLOPIDOGREL BISULFATE SCH MG: 75 TABLET ORAL at 08:57

## 2018-08-08 RX ADMIN — LISINOPRIL SCH MG: 10 TABLET ORAL at 08:58

## 2018-08-08 RX ADMIN — CITALOPRAM HYDROBROMIDE SCH MG: 10 TABLET ORAL at 08:57

## 2018-08-08 RX ADMIN — Medication SCH MG: at 08:57

## 2018-08-08 RX ADMIN — CARVEDILOL SCH MG: 3.12 TABLET, FILM COATED ORAL at 08:58

## 2018-08-08 RX ADMIN — INSULIN GLARGINE SCH UNITS: 100 INJECTION, SOLUTION SUBCUTANEOUS at 09:22

## 2018-08-08 RX ADMIN — CHOLESTYRAMINE SCH GM: 4 POWDER, FOR SUSPENSION ORAL at 08:57

## 2018-08-08 RX ADMIN — DOCUSATE SODIUM SCH MG: 100 CAPSULE, LIQUID FILLED ORAL at 08:57

## 2018-08-08 RX ADMIN — CARVEDILOL SCH MG: 3.12 TABLET, FILM COATED ORAL at 16:17

## 2018-08-08 RX ADMIN — I-VITE, TAB 1000-60-2MG (60/BT) SCH TAB: TAB at 08:57

## 2018-08-08 RX ADMIN — I-VITE, TAB 1000-60-2MG (60/BT) SCH TAB: TAB at 21:26

## 2018-08-08 RX ADMIN — ACETAMINOPHEN SCH MG: 500 TABLET ORAL at 08:57

## 2018-08-08 RX ADMIN — ISOSORBIDE MONONITRATE SCH MG: 30 TABLET, EXTENDED RELEASE ORAL at 08:58

## 2018-08-08 RX ADMIN — BUMETANIDE SCH MG: 1 TABLET ORAL at 08:57

## 2018-08-08 RX ADMIN — PANTOPRAZOLE SODIUM SCH MG: 40 TABLET, DELAYED RELEASE ORAL at 08:57

## 2018-08-08 RX ADMIN — CEFTRIAXONE SCH GM: 1 INJECTION, POWDER, FOR SOLUTION INTRAMUSCULAR; INTRAVENOUS at 16:17

## 2018-08-08 RX ADMIN — INSULIN LISPRO SCH UNITS: 100 INJECTION, SOLUTION INTRAVENOUS; SUBCUTANEOUS at 13:05

## 2018-08-08 RX ADMIN — INSULIN LISPRO SCH UNITS: 100 INJECTION, SOLUTION INTRAVENOUS; SUBCUTANEOUS at 17:25

## 2018-08-08 RX ADMIN — ACETAMINOPHEN SCH MG: 500 TABLET ORAL at 21:47

## 2018-08-08 NOTE — PN
DATE:  08/08/2018



She is in room 648.



SUBJECTIVE:  The patient sound asleep this morning, resting quietly.  She was

not awakened.



OBJECTIVE:

VITAL SIGNS:  Stable.  She is afebrile.  Sugars have been decent.

CHEST:  Clear.

HEART:  Irregular.

ABDOMEN:  Benign.



INR is pending this morning.  She has had no hematochezia since admission.



IMPRESSION:

1.  Shortness of breath, multifactorial with iron deficiency anemia as well as

congestive heart failure and atrial fibrillation with at times a rapid response

with activity.

2.  Cardiomyopathy.

3.  Diabetes.

4.  Coagulopathy, improving.



PLAN:  Continue to hold Coumadin with plans on panendoscopy today or tomorrow. 

Otherwise, same with any further medication adjustments per Cardiology.

 



______________________________

MARSHALL RICHARDS MD



DR:  VENKATA/tu  JOB#:  [id number] / [id number]

DD:  08/08/2018 07:46  DT:  08/08/2018 19:12

## 2018-08-08 NOTE — PDOC
Subjective:


Subjective:


In restroom, says she's doing fine.





Objective:


Objective:


D/w Shelley/cardiology yesterday - ok for 'scopes.


Vital Signs:





 Vital Signs








  Date Time  Temp Pulse Resp B/P (MAP) Pulse Ox O2 Delivery O2 Flow Rate FiO2


 


8/8/18 08:58  83  141/57    


 


8/8/18 07:58 97.1  22  94 Room Air  





 97.1       








Labs:





Laboratory Tests








Test


 8/7/18


10:33 8/7/18


16:55 8/7/18


20:19 8/8/18


07:50


 


Glucose (Fingerstick)


 209 mg/dL


(70-99) 198 mg/dL


(70-99) 161 mg/dL


(70-99) 116 mg/dL


(70-99)











PE:





GEN: tray of clears empty


NEURO/PSYCH: A & O 3 - spoke through restroom door





A/P:


MIHAELA w/ h/o Perla's and adenomatous colon polyps - on PPI, overdue for 

surveillance - last Hgb 8/6


Cardiomyopathy, CAD, A Fib - took Plavix today?





--


Prep in progress for EGD and colonoscopy tomorrow afternoon.











JOSE FOURNIER Aug 8, 2018 09:26

## 2018-08-09 VITALS — SYSTOLIC BLOOD PRESSURE: 134 MMHG | DIASTOLIC BLOOD PRESSURE: 52 MMHG

## 2018-08-09 VITALS — SYSTOLIC BLOOD PRESSURE: 139 MMHG | DIASTOLIC BLOOD PRESSURE: 47 MMHG

## 2018-08-09 VITALS — DIASTOLIC BLOOD PRESSURE: 53 MMHG | SYSTOLIC BLOOD PRESSURE: 158 MMHG

## 2018-08-09 VITALS — SYSTOLIC BLOOD PRESSURE: 108 MMHG | DIASTOLIC BLOOD PRESSURE: 48 MMHG

## 2018-08-09 VITALS — DIASTOLIC BLOOD PRESSURE: 46 MMHG | SYSTOLIC BLOOD PRESSURE: 146 MMHG

## 2018-08-09 LAB
ANISOCYTOSIS BLD QL SMEAR: SLIGHT
BASOPHILS # BLD AUTO: 0.1 X10^3/UL (ref 0–0.2)
BASOPHILS NFR BLD: 1 % (ref 0–3)
EOSINOPHIL NFR BLD: 0.1 X10^3/UL (ref 0–0.7)
EOSINOPHIL NFR BLD: 2 % (ref 0–3)
ERYTHROCYTE [DISTWIDTH] IN BLOOD BY AUTOMATED COUNT: 20.1 % (ref 11.5–14.5)
HCT VFR BLD CALC: 26.2 % (ref 36–47)
HGB BLD-MCNC: 9 G/DL (ref 12–15.5)
LYMPHOCYTES # BLD: 1.6 X10^3/UL (ref 1–4.8)
LYMPHOCYTES NFR BLD AUTO: 22 % (ref 24–48)
MCH RBC QN AUTO: 29 PG (ref 25–35)
MCHC RBC AUTO-ENTMCNC: 34 G/DL (ref 31–37)
MCV RBC AUTO: 84 FL (ref 79–100)
MONO #: 0.6 X10^3/UL (ref 0–1.1)
MONOCYTES NFR BLD: 8 % (ref 0–9)
NEUT #: 4.7 X10^3UL (ref 1.8–7.7)
NEUTROPHILS NFR BLD AUTO: 67 % (ref 31–73)
PLATELET # BLD AUTO: 390 X10^3/UL (ref 140–400)
PLATELET # BLD EST: ADEQUATE 10*3/UL
PROTHROMBIN TIME: 16.9 SEC (ref 11.7–14)
RBC # BLD AUTO: 3.13 X10^6/UL (ref 3.5–5.4)
WBC # BLD AUTO: 7.1 X10^3/UL (ref 4–11)

## 2018-08-09 PROCEDURE — 0DJD8ZZ INSPECTION OF LOWER INTESTINAL TRACT, VIA NATURAL OR ARTIFICIAL OPENING ENDOSCOPIC: ICD-10-PCS

## 2018-08-09 PROCEDURE — 0DB38ZX EXCISION OF LOWER ESOPHAGUS, VIA NATURAL OR ARTIFICIAL OPENING ENDOSCOPIC, DIAGNOSTIC: ICD-10-PCS

## 2018-08-09 RX ADMIN — ISOSORBIDE MONONITRATE SCH MG: 30 TABLET, EXTENDED RELEASE ORAL at 09:00

## 2018-08-09 RX ADMIN — CARVEDILOL SCH MG: 3.12 TABLET, FILM COATED ORAL at 08:00

## 2018-08-09 RX ADMIN — CARVEDILOL SCH MG: 3.12 TABLET, FILM COATED ORAL at 17:22

## 2018-08-09 RX ADMIN — I-VITE, TAB 1000-60-2MG (60/BT) SCH TAB: TAB at 21:24

## 2018-08-09 RX ADMIN — INSULIN GLARGINE SCH UNITS: 100 INJECTION, SOLUTION SUBCUTANEOUS at 08:00

## 2018-08-09 RX ADMIN — LISINOPRIL SCH MG: 10 TABLET ORAL at 09:00

## 2018-08-09 RX ADMIN — INSULIN LISPRO SCH UNITS: 100 INJECTION, SOLUTION INTRAVENOUS; SUBCUTANEOUS at 08:00

## 2018-08-09 RX ADMIN — CITALOPRAM HYDROBROMIDE SCH MG: 10 TABLET ORAL at 09:00

## 2018-08-09 RX ADMIN — INSULIN LISPRO SCH UNITS: 100 INJECTION, SOLUTION INTRAVENOUS; SUBCUTANEOUS at 12:00

## 2018-08-09 RX ADMIN — BUMETANIDE SCH MG: 1 TABLET ORAL at 09:00

## 2018-08-09 RX ADMIN — ACETAMINOPHEN SCH MG: 500 TABLET ORAL at 09:00

## 2018-08-09 RX ADMIN — PANTOPRAZOLE SODIUM SCH MG: 40 TABLET, DELAYED RELEASE ORAL at 07:30

## 2018-08-09 RX ADMIN — Medication SCH MG: at 09:00

## 2018-08-09 RX ADMIN — ACETAMINOPHEN SCH MG: 500 TABLET ORAL at 21:24

## 2018-08-09 RX ADMIN — SIMVASTATIN SCH MG: 40 TABLET, FILM COATED ORAL at 21:24

## 2018-08-09 RX ADMIN — I-VITE, TAB 1000-60-2MG (60/BT) SCH TAB: TAB at 09:00

## 2018-08-09 RX ADMIN — CHOLESTYRAMINE SCH GM: 4 POWDER, FOR SUSPENSION ORAL at 09:00

## 2018-08-09 RX ADMIN — SODIUM CHLORIDE, SODIUM LACTATE, POTASSIUM CHLORIDE, AND CALCIUM CHLORIDE SCH MLS/HR: .6; .31; .03; .02 INJECTION, SOLUTION INTRAVENOUS at 20:54

## 2018-08-09 RX ADMIN — CEFTRIAXONE SCH GM: 1 INJECTION, POWDER, FOR SOLUTION INTRAMUSCULAR; INTRAVENOUS at 17:23

## 2018-08-09 RX ADMIN — INSULIN LISPRO SCH UNITS: 100 INJECTION, SOLUTION INTRAVENOUS; SUBCUTANEOUS at 17:27

## 2018-08-09 RX ADMIN — DOCUSATE SODIUM SCH MG: 100 CAPSULE, LIQUID FILLED ORAL at 09:00

## 2018-08-09 RX ADMIN — SODIUM CHLORIDE, SODIUM LACTATE, POTASSIUM CHLORIDE, AND CALCIUM CHLORIDE SCH MLS/HR: .6; .31; .03; .02 INJECTION, SOLUTION INTRAVENOUS at 13:12

## 2018-08-09 RX ADMIN — CLOPIDOGREL BISULFATE SCH MG: 75 TABLET ORAL at 09:00

## 2018-08-09 RX ADMIN — METHOTREXATE SCH MG: 2.5 TABLET ORAL at 09:00

## 2018-08-09 NOTE — PDOC4
PROCEDURE


Procedure


EGD/biopsies, colonoscopy





Indication: ICA, h/o polyps, h/o Perla's w/o surveillance.





Meds: per anesthesia





Findings:





E--Multiple nodules, 30-35 cm, with area on posterior wall at ~31cm suggestive 

of malignancy.  Multiple biopsies taken.


G--Small HH


D--Normal to second portion.  





LISSA: normal





--Colonoscope advanced to cecum; appy and ic valve id'd.  Mucosa normal/fair 

prep.  No polyps, tics, masses seen.  Small IH's on retroflex.





Yaritza. well.





IMP: Esophageal malignancy suspect, biopsies pending.


        HH


        Internal hemorrhoids.





REC: Await biopsies.


         Oncologic opinion re: therapy if malignant; not likely candidate for 

definitive resection.





Thanks.











STEPHEN MCKENZIE MD Aug 9, 2018 14:09

## 2018-08-09 NOTE — PN
DATE:  08/09/2018



She is in room 648.



SUBJECTIVE:  The patient is awake, alert, just finished bath and is getting

dressed.  She is ready for panendoscopy today per GI.  She does state that she

is feeling better daily and feels like her shortness of breath is actually quite

a bit better than it has been.



OBJECTIVE:

VITAL SIGNS:  Stable.  She is afebrile.  Sugars have been decent.  Hemoglobin is

up to 9 this morning.  INR is down to 1.4.

CHEST:  Clear.

HEART:  Regular.

ABDOMEN:  Benign.



IMPRESSION:

1.  Shortness of breath, multifactorial with iron deficiency anemia, congestive

heart failure and atrial fibrillation.

2.  Cardiomyopathy.

3.  Diabetes.

4.  Coagulopathy, resolved.



PLAN:  We will await results of panendoscopy today and final suggestions by

Cardiology as far as management of congestive heart failure and atrial

fibrillation with a rapid ventricular response at times with likely discharge

tomorrow.

 



______________________________

MARSHALL RICHARDS MD



DR:  VENKATA/tu  JOB#:  [id number] / [id number]

DD:  08/09/2018 08:58  DT:  08/09/2018 18:10

## 2018-08-09 NOTE — PDOC
CARDIO Progress Notes


Date and Time


Date of Service


8/9/2018


Time of Evaluation


0950





Subjective


Subjective:  No Chest Pain, No shortness of breath, No Palpitations, No 

Dizziness





Vitals


Vitals





Vital Signs








  Date Time  Temp Pulse Resp B/P (MAP) Pulse Ox O2 Delivery O2 Flow Rate FiO2


 


8/9/18 08:00      Room Air 2.0 


 


8/9/18 07:40 97.9 86 20 134/52 (79) 92   





 97.9       








Weight


Weight [ ]





Input and Output


Intake and Output











Intake and Output 


 


 8/9/18





 07:00


 


Intake Total 1350 ml


 


Output Total 10 ml


 


Balance 1340 ml


 


 


 


Intake Oral 1350 ml


 


Urine/Stool Mix 10 ml


 


# Voids 10











Laboratory


Labs





Laboratory Tests








Test


 8/8/18


11:46 8/8/18


16:50 8/8/18


20:35 8/9/18


04:00


 


Glucose (Fingerstick)


 112 mg/dL


(70-99) 87 mg/dL


(70-99) 136 mg/dL


(70-99) 





 


White Blood Count


 


 


 


 7.1 x10^3/uL


(4.0-11.0)


 


Red Blood Count


 


 


 


 3.13 x10^6/uL


(3.50-5.40)


 


Hemoglobin


 


 


 


 9.0 g/dL


(12.0-15.5)


 


Hematocrit


 


 


 


 26.2 %


(36.0-47.0)


 


Mean Corpuscular Volume    84 fL () 


 


Mean Corpuscular Hemoglobin    29 pg (25-35) 


 


Mean Corpuscular Hemoglobin


Concent 


 


 


 34 g/dL


(31-37)


 


Red Cell Distribution Width


 


 


 


 20.1 %


(11.5-14.5)


 


Platelet Count


 


 


 


 390 x10^3/uL


(140-400)


 


Neutrophils (%) (Auto)    67 % (31-73) 


 


Lymphocytes (%) (Auto)    22 % (24-48) 


 


Monocytes (%) (Auto)    8 % (0-9) 


 


Eosinophils (%) (Auto)    2 % (0-3) 


 


Basophils (%) (Auto)    1 % (0-3) 


 


Neutrophils # (Auto)


 


 


 


 4.7 x10^3uL


(1.8-7.7)


 


Lymphocytes # (Auto)


 


 


 


 1.6 x10^3/uL


(1.0-4.8)


 


Monocytes # (Auto)


 


 


 


 0.6 x10^3/uL


(0.0-1.1)


 


Eosinophils # (Auto)


 


 


 


 0.1 x10^3/uL


(0.0-0.7)


 


Basophils # (Auto)


 


 


 


 0.1 x10^3/uL


(0.0-0.2)


 


Platelet Estimate


 


 


 


 Adequate


(ADEQUATE)


 


Anisocytosis    Slight 


 


Prothrombin Time


 


 


 


 16.9 SEC


(11.7-14.0)


 


Prothromb Time International


Ratio 


 


 


 1.4 (0.8-1.1) 





 


Test


 8/9/18


07:26 


 


 





 


Glucose (Fingerstick)


 112 mg/dL


(70-99) 


 


 














Microbiology


Micro





Microbiology


8/6/18 Blood Culture - Preliminary, Resulted


         NO GROWTH AFTER 3 DAYS





Physical Exam


HEENT:  Neck Supple W Full Motion


Chest:  Symmetric


LUNGS:  Clear to Auscultation


Heart:  murmurs (2/6LSB), irregularly irregular (AFIB rate controlled)


Abdomen:  Soft N/T


Extremities:  No Edema, No Calf Tenderness


Neurology:  alert, oriented, follow commands





Assessment


Assessment


1.  AFIB: possibly paroxysmal, Presently rate controlled. Was on xarelto in the 

past. 


2.  Hx of CVA


3.  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 

at 35-40%. Compensated


4.  CAD with previous CABG x4 on 8/11/2009, LIMA to LAD and Diagonal as Y graft

, SVG to OM/RCA. Stable no cardiac symptoms. 


5.  HTN: controlled


6.  HLP: well controlled


7.  Anemia with possible GI bleed: EGD and colonoscopy today. Hgb now at 9. 


8.  DM2





Recommendations


1. Continue with current cardiac regimen/secondary prevention measures 

including diuretic. 


2. Pt wishes to follow up in our office as an outpt and will be seen in CHF 

clinic in 1-2 weeks. 


3. Unknown last ischemic workup as records were reviewed and no records noted. 

Will consider for outpt stress test. 


4. Continue with plavix.  If warfarin could not be resumed for stroke 

prevention then may need at least 81 mg of ECASA. 


5. Will reevaluate EF with an echo in 3 months and will note any need for AICD.

  Lifevest is a consideration. 


6. FR 2L and daily wt.  Tolerated bowel prep.











LEWIS GUTIERREZ Aug 9, 2018 11:09

## 2018-08-10 VITALS — DIASTOLIC BLOOD PRESSURE: 41 MMHG | SYSTOLIC BLOOD PRESSURE: 113 MMHG

## 2018-08-10 VITALS — DIASTOLIC BLOOD PRESSURE: 45 MMHG | SYSTOLIC BLOOD PRESSURE: 118 MMHG

## 2018-08-10 VITALS — SYSTOLIC BLOOD PRESSURE: 154 MMHG | DIASTOLIC BLOOD PRESSURE: 56 MMHG

## 2018-08-10 VITALS — SYSTOLIC BLOOD PRESSURE: 131 MMHG | DIASTOLIC BLOOD PRESSURE: 51 MMHG

## 2018-08-10 VITALS — DIASTOLIC BLOOD PRESSURE: 52 MMHG | SYSTOLIC BLOOD PRESSURE: 149 MMHG

## 2018-08-10 VITALS — DIASTOLIC BLOOD PRESSURE: 61 MMHG | SYSTOLIC BLOOD PRESSURE: 143 MMHG

## 2018-08-10 LAB
ALBUMIN SERPL-MCNC: 2.3 G/DL (ref 3.4–5)
ALBUMIN/GLOB SERPL: 0.5 {RATIO} (ref 1–1.7)
ALP SERPL-CCNC: 125 U/L (ref 46–116)
ALT SERPL-CCNC: 17 U/L (ref 14–59)
ANION GAP SERPL CALC-SCNC: 9 MMOL/L (ref 6–14)
AST SERPL-CCNC: 17 U/L (ref 15–37)
BASOPHILS # BLD AUTO: 0.1 X10^3/UL (ref 0–0.2)
BASOPHILS NFR BLD: 1 % (ref 0–3)
BILIRUB SERPL-MCNC: 0.4 MG/DL (ref 0.2–1)
BUN SERPL-MCNC: 9 MG/DL (ref 7–20)
BUN/CREAT SERPL: 11 (ref 6–20)
CALCIUM SERPL-MCNC: 8.7 MG/DL (ref 8.5–10.1)
CHLORIDE SERPL-SCNC: 104 MMOL/L (ref 98–107)
CO2 SERPL-SCNC: 28 MMOL/L (ref 21–32)
CREAT SERPL-MCNC: 0.8 MG/DL (ref 0.6–1)
EOSINOPHIL NFR BLD: 0.1 X10^3/UL (ref 0–0.7)
EOSINOPHIL NFR BLD: 2 % (ref 0–3)
ERYTHROCYTE [DISTWIDTH] IN BLOOD BY AUTOMATED COUNT: 20.3 % (ref 11.5–14.5)
GFR SERPLBLD BASED ON 1.73 SQ M-ARVRAT: 68.7 ML/MIN
GLOBULIN SER-MCNC: 4.6 G/DL (ref 2.2–3.8)
GLUCOSE SERPL-MCNC: 114 MG/DL (ref 70–99)
HCT VFR BLD CALC: 26.1 % (ref 36–47)
HGB BLD-MCNC: 8.6 G/DL (ref 12–15.5)
LYMPHOCYTES # BLD: 1.4 X10^3/UL (ref 1–4.8)
LYMPHOCYTES NFR BLD AUTO: 22 % (ref 24–48)
MCH RBC QN AUTO: 28 PG (ref 25–35)
MCHC RBC AUTO-ENTMCNC: 33 G/DL (ref 31–37)
MCV RBC AUTO: 84 FL (ref 79–100)
MONO #: 0.6 X10^3/UL (ref 0–1.1)
MONOCYTES NFR BLD: 10 % (ref 0–9)
NEUT #: 4 X10^3UL (ref 1.8–7.7)
NEUTROPHILS NFR BLD AUTO: 65 % (ref 31–73)
PLATELET # BLD AUTO: 361 X10^3/UL (ref 140–400)
POTASSIUM SERPL-SCNC: 3.8 MMOL/L (ref 3.5–5.1)
PROT SERPL-MCNC: 6.9 G/DL (ref 6.4–8.2)
RBC # BLD AUTO: 3.11 X10^6/UL (ref 3.5–5.4)
RETICS/RBC NFR AUTO: 3.3 % (ref 0.5–2.5)
SODIUM SERPL-SCNC: 141 MMOL/L (ref 136–145)
WBC # BLD AUTO: 6.1 X10^3/UL (ref 4–11)

## 2018-08-10 RX ADMIN — CITALOPRAM HYDROBROMIDE SCH MG: 10 TABLET ORAL at 08:59

## 2018-08-10 RX ADMIN — I-VITE, TAB 1000-60-2MG (60/BT) SCH TAB: TAB at 08:59

## 2018-08-10 RX ADMIN — ACETAMINOPHEN SCH MG: 500 TABLET ORAL at 20:55

## 2018-08-10 RX ADMIN — ASPIRIN SCH MG: 81 TABLET, COATED ORAL at 08:58

## 2018-08-10 RX ADMIN — INSULIN LISPRO SCH UNITS: 100 INJECTION, SOLUTION INTRAVENOUS; SUBCUTANEOUS at 12:31

## 2018-08-10 RX ADMIN — SIMVASTATIN SCH MG: 40 TABLET, FILM COATED ORAL at 20:55

## 2018-08-10 RX ADMIN — PANTOPRAZOLE SODIUM SCH MG: 40 TABLET, DELAYED RELEASE ORAL at 09:00

## 2018-08-10 RX ADMIN — INSULIN LISPRO SCH UNITS: 100 INJECTION, SOLUTION INTRAVENOUS; SUBCUTANEOUS at 18:04

## 2018-08-10 RX ADMIN — CARVEDILOL SCH MG: 3.12 TABLET, FILM COATED ORAL at 09:02

## 2018-08-10 RX ADMIN — DOCUSATE SODIUM SCH MG: 100 CAPSULE, LIQUID FILLED ORAL at 08:59

## 2018-08-10 RX ADMIN — INSULIN LISPRO SCH UNITS: 100 INJECTION, SOLUTION INTRAVENOUS; SUBCUTANEOUS at 09:12

## 2018-08-10 RX ADMIN — ISOSORBIDE MONONITRATE SCH MG: 30 TABLET, EXTENDED RELEASE ORAL at 09:00

## 2018-08-10 RX ADMIN — LISINOPRIL SCH MG: 10 TABLET ORAL at 09:01

## 2018-08-10 RX ADMIN — Medication SCH MG: at 08:58

## 2018-08-10 RX ADMIN — INSULIN GLARGINE SCH UNITS: 100 INJECTION, SOLUTION SUBCUTANEOUS at 08:00

## 2018-08-10 RX ADMIN — BUMETANIDE SCH MG: 1 TABLET ORAL at 08:57

## 2018-08-10 RX ADMIN — ACETAMINOPHEN SCH MG: 500 TABLET ORAL at 08:57

## 2018-08-10 RX ADMIN — CARVEDILOL SCH MG: 3.12 TABLET, FILM COATED ORAL at 17:49

## 2018-08-10 RX ADMIN — CEFTRIAXONE SCH GM: 1 INJECTION, POWDER, FOR SOLUTION INTRAMUSCULAR; INTRAVENOUS at 17:50

## 2018-08-10 RX ADMIN — I-VITE, TAB 1000-60-2MG (60/BT) SCH TAB: TAB at 20:55

## 2018-08-10 RX ADMIN — CHOLESTYRAMINE SCH GM: 4 POWDER, FOR SUSPENSION ORAL at 12:27

## 2018-08-10 NOTE — RAD
Examination: CT chest abdomen pelvis with oral and IV contrast

 

HISTORY: History of staging of esophageal cancer

 

COMPARISON: None available

 

TECHNIQUE: Axial CT images of the chest abdomen pelvis were performed with

IV contrast. Oral contrast was used. Coronal and sagittally. Performed

 

Exposure: One or more of the following individualized dose reduction 

techniques were utilized for this examination:  1. Automated exposure 

control  2. Adjustment of the mA and/or kV according to patient size  3. 

Use of iterative reconstruction technique

 

FINDINGS:

 

 

The visualized thyroid gland grossly appears unremarkable. The central 

airways are patent.

 

Moderate cardiomegaly. Diffuse coronary artery calcifications.

 

Small hiatal hernia is identified.

 

There are multiple mediastinal lymph nodes identified with the largest 

measuring 1.8 cm in the pretracheal region.

 

There is a small nodule identified in the right apical lung anteriorly 

measuring 5.5 mm, the smaller nodule identified in the right upper lobe of

the lung anteriorly measuring 4 mm, 3 mm nodule identified in the right 

lateral upper lobe the lung, 5 mm nodule identified in the superior right 

lower lobe of the lung. Linear airspace opacity identified in the left 

lingula likely atelectasis or scarring. There is mild prominent appearing 

bilateral interstitial lung markings.

 

There is heterogeneous appearance of the liver with decreased attenuation 

likely hepatic steatosis. The visualized spleen demonstrates a few 

calcified granulomas. Cholecystectomy clips identified.

 

There is mild thickening of the distal esophagus with small hiatal hernia.

Few paraesophageal lymph nodes identified to the right of the upper 

esophagus with the largest measuring 1.9 cm. The stomach is mildly 

distended. The small bowel is nondilated

 

Feces and gas noted in the colon. The bilateral kidneys enhance 

symmetrically.

 

Severe aortic atherosclerosis

 

Moderate degenerative changes identified in the thoracic lumbar spine. 

There are surgical changes in the right breast.

 

 

 

IMPRESSION:

 

1. Multiple mediastinal lymph nodes identified and few paraesophageal 

lymph nodes could be reactive or metastatic. Consider PET CT scan for 

further evaluation.

 

2. Scattered multiple lung nodules with the largest measuring 5.5 mm in 

the right apical lung.

 

3. Mild thickening of the distal esophagus. 

 

 4. Coronary artery calcifications.

 

5. Hepatic steatosis.

 

6. Mild lung congestive changes.

 

Electronically signed by: Jose Ramires MD (8/10/2018 10:20 AM) 

[id number]

## 2018-08-10 NOTE — PDOC
Objective:


Objective:


Reviewed w/ RN - out of room for imaging, apparently now admits to some 

dysphagia.


Reviewed Dr. Champion's note.


Vital Signs:





 Vital Signs








  Date Time  Temp Pulse Resp B/P (MAP) Pulse Ox O2 Delivery O2 Flow Rate FiO2


 


8/10/18 07:00 97.5 79 24 154/56 (88) 96 Nasal Cannula 2.0 





 97.5       








Labs:





Laboratory Tests








Test


 8/9/18


17:16 8/9/18


20:38 8/10/18


06:05 8/10/18


07:42


 


Glucose (Fingerstick) 196 mg/dL  189 mg/dL   121 mg/dL 


 


White Blood Count   6.1 x10^3/uL  


 


Red Blood Count   3.11 x10^6/uL  


 


Hemoglobin   8.6 g/dL  


 


Hematocrit   26.1 %  


 


Mean Corpuscular Volume   84 fL  


 


Mean Corpuscular Hemoglobin   28 pg  


 


Mean Corpuscular Hemoglobin


Concent 


 


 33 g/dL 


 





 


Red Cell Distribution Width   20.3 %  


 


Platelet Count   361 x10^3/uL  


 


Neutrophils (%) (Auto)   65 %  


 


Lymphocytes (%) (Auto)   22 %  


 


Monocytes (%) (Auto)   10 %  


 


Eosinophils (%) (Auto)   2 %  


 


Basophils (%) (Auto)   1 %  


 


Neutrophils # (Auto)   4.0 x10^3uL  


 


Lymphocytes # (Auto)   1.4 x10^3/uL  


 


Monocytes # (Auto)   0.6 x10^3/uL  


 


Eosinophils # (Auto)   0.1 x10^3/uL  


 


Basophils # (Auto)   0.1 x10^3/uL  


 


Reticulocyte Count (auto)   3.3 %  


 


Sodium Level   141 mmol/L  


 


Potassium Level   3.8 mmol/L  


 


Chloride Level   104 mmol/L  


 


Carbon Dioxide Level   28 mmol/L  


 


Anion Gap   9  


 


Blood Urea Nitrogen   9 mg/dL  


 


Creatinine   0.8 mg/dL  


 


Estimated GFR


(Cockcroft-Gault) 


 


 68.7 


 





 


BUN/Creatinine Ratio   11  


 


Glucose Level   114 mg/dL  


 


Calcium Level   8.7 mg/dL  


 


Total Bilirubin   0.4 mg/dL  


 


Aspartate Amino Transf


(AST/SGOT) 


 


 17 U/L 


 





 


Alanine Aminotransferase


(ALT/SGPT) 


 


 17 U/L 


 





 


Alkaline Phosphatase   125 U/L  


 


Total Protein   6.9 g/dL  


 


Albumin   2.3 g/dL  


 


Albumin/Globulin Ratio   0.5  








Imaging:


EGD 8/9/18


E--Multiple nodules, 30-35 cm, with area on posterior wall at ~31cm suggestive 

of malignancy.  Multiple biopsies taken.


G--Small HH


D--Normal to second portion.  


--Colonoscope advanced to cecum; appy and ic valve id'd.  Mucosa normal/fair 

prep.  No polyps, tics, masses seen.  Small IH's on retroflex.


IMP: Esophageal malignancy suspect, biopsies pending.


        HH


        Internal hemorrhoids.





Chest/abd/pelv CT 8/10/18


PENDING





PE:


no exam





A/P:


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


MIHAELA


Cardiomyopathy, CAD, A Fib - on ASA





--


Await path and imaging.











JOSE FOURNIER Aug 10, 2018 09:11

## 2018-08-10 NOTE — PN
DATE:  08/10/2018



ROOM:  648.



SUBJECTIVE:  The patient is awake and alert, understands the findings on EGD,

yesterday was suspicious for an esophageal malignancy and is at peace with it

and feels like whatever needs to be done can be done.



OBJECTIVE:

VITAL SIGNS:  Stable.  She is afebrile.  Hemoglobin is 8.6.  Sugars are good. 

EGD shows a questionable esophageal malignancy with biopsies pending, also

showed hiatal hernia.  She had internal hemorrhoids on colonoscopy.

CHEST:  Clear.

HEART:  Irregular.

ABDOMEN:  Benign.  She is scheduled for CT scanning today for staging of the

esophageal process.



IMPRESSION:

1.  Shortness of breath with iron deficiency anemia, congestive heart failure,

atrial fibrillation.

2.  Cardiomyopathy.

3.  Diabetes.

4.  Coagulopathy, resolved with holding Coumadin.

5.  Possible esophageal cancer.



PLAN:  Await CT scanning and opinions of consultants.

 



______________________________

MARSHALL RICHARDS MD



DR:  [email or web address]  JOB#:  [id number] / [id number]

DD:  08/10/2018 08:53  DT:  08/10/2018 16:02

## 2018-08-10 NOTE — PDOC
PROGRESS NOTES


Subjective


Subjective


HPI  -f/u of  Esophageal nodules clinically concerning for esophageal cancer 

noted on EGD


done on 08/09/2018. 





ROS - no CP





Objective


Objective





Vital Signs








  Date Time  Temp Pulse Resp B/P (MAP) Pulse Ox O2 Delivery O2 Flow Rate FiO2


 


8/10/18 14:50 95.7 83 20 125/41 (69) 91 Room Air  





 95.7   113/43 (66)    


 


8/10/18 10:50       2.0 














Intake and Output 


 


 8/10/18





 07:00


 


Intake Total 1180 ml


 


Output Total 1 ml


 


Balance 1179 ml


 


 


 


Intake Oral 880 ml


 


IV Total 300 ml


 


Urine/Stool Mix 1 ml


 


# Voids 4











Physical Exam


Heart:  Normal S1, Normal S2


General:  Alert, Oriented X3, No acute distress


Lungs:  Clear to auscultation


Neuro:  Normal speech


Psych/Mental Status:  Mental status NL





Assessment


Assessment


IMPRESSION AND PLAN:


1.  Esophageal nodules clinically concerning for esophageal cancer noted on EGD


done on 08/09/2018.  Biopsies have been obtained and results are pending. 


CT chest, abdomen and pelvis 8/10/18 Multiple mediastinal lymph nodes 

identified and few paraesophageal 


lymph nodes could be reactive or metastatic. Plan PET CT scan for 


further evaluation. Scattered multiple lung nodules with the largest measuring 

5.5 mm in 


the right apical lung.


Bone scan 8/10/18 is neg.





Considering her age and comorbidities, treatment options would be


definitive radiation therapy versus combined chemoradiation.  I also


d/w DR Robertson, Radiation Oncology and Dr Zimmer.  I discussed in detail with 

the


patient.  She understands and agrees with the plan.





2.  Anemia.  Iron deficiency is noted with very decreased iron saturation, but


the ferritin is normal. s/p Venofer 500 mg IV x 1 dose 8/9/18.





3.  Coronary artery disease.  Her ejection fraction on echocardiogram done on


08/06/2018 was only 30-35%.  Appreciate Cardiology management.





Comment


Review of Relevant


I have reviewed the following items claudio (where applicable) has been applied.


Labs





Laboratory Tests








Test


 8/8/18


16:50 8/8/18


20:35 8/9/18


04:00 8/9/18


07:26


 


Glucose (Fingerstick)


 87 mg/dL


(70-99) 136 mg/dL


(70-99) 


 112 mg/dL


(70-99)


 


White Blood Count


 


 


 7.1 x10^3/uL


(4.0-11.0) 





 


Red Blood Count


 


 


 3.13 x10^6/uL


(3.50-5.40) 





 


Hemoglobin


 


 


 9.0 g/dL


(12.0-15.5) 





 


Hematocrit


 


 


 26.2 %


(36.0-47.0) 





 


Mean Corpuscular Volume   84 fL ()  


 


Mean Corpuscular Hemoglobin   29 pg (25-35)  


 


Mean Corpuscular Hemoglobin


Concent 


 


 34 g/dL


(31-37) 





 


Red Cell Distribution Width


 


 


 20.1 %


(11.5-14.5) 





 


Platelet Count


 


 


 390 x10^3/uL


(140-400) 





 


Neutrophils (%) (Auto)   67 % (31-73)  


 


Lymphocytes (%) (Auto)   22 % (24-48)  


 


Monocytes (%) (Auto)   8 % (0-9)  


 


Eosinophils (%) (Auto)   2 % (0-3)  


 


Basophils (%) (Auto)   1 % (0-3)  


 


Neutrophils # (Auto)


 


 


 4.7 x10^3uL


(1.8-7.7) 





 


Lymphocytes # (Auto)


 


 


 1.6 x10^3/uL


(1.0-4.8) 





 


Monocytes # (Auto)


 


 


 0.6 x10^3/uL


(0.0-1.1) 





 


Eosinophils # (Auto)


 


 


 0.1 x10^3/uL


(0.0-0.7) 





 


Basophils # (Auto)


 


 


 0.1 x10^3/uL


(0.0-0.2) 





 


Platelet Estimate


 


 


 Adequate


(ADEQUATE) 





 


Anisocytosis   Slight  


 


Prothrombin Time


 


 


 16.9 SEC


(11.7-14.0) 





 


Prothromb Time International


Ratio 


 


 1.4 (0.8-1.1) 


 





 


Test


 8/9/18


17:16 8/9/18


20:38 8/10/18


06:05 8/10/18


07:42


 


Glucose (Fingerstick)


 196 mg/dL


(70-99) 189 mg/dL


(70-99) 


 121 mg/dL


(70-99)


 


White Blood Count


 


 


 6.1 x10^3/uL


(4.0-11.0) 





 


Red Blood Count


 


 


 3.11 x10^6/uL


(3.50-5.40) 





 


Hemoglobin


 


 


 8.6 g/dL


(12.0-15.5) 





 


Hematocrit


 


 


 26.1 %


(36.0-47.0) 





 


Mean Corpuscular Volume   84 fL ()  


 


Mean Corpuscular Hemoglobin   28 pg (25-35)  


 


Mean Corpuscular Hemoglobin


Concent 


 


 33 g/dL


(31-37) 





 


Red Cell Distribution Width


 


 


 20.3 %


(11.5-14.5) 





 


Platelet Count


 


 


 361 x10^3/uL


(140-400) 





 


Neutrophils (%) (Auto)   65 % (31-73)  


 


Lymphocytes (%) (Auto)   22 % (24-48)  


 


Monocytes (%) (Auto)   10 % (0-9)  


 


Eosinophils (%) (Auto)   2 % (0-3)  


 


Basophils (%) (Auto)   1 % (0-3)  


 


Neutrophils # (Auto)


 


 


 4.0 x10^3uL


(1.8-7.7) 





 


Lymphocytes # (Auto)


 


 


 1.4 x10^3/uL


(1.0-4.8) 





 


Monocytes # (Auto)


 


 


 0.6 x10^3/uL


(0.0-1.1) 





 


Eosinophils # (Auto)


 


 


 0.1 x10^3/uL


(0.0-0.7) 





 


Basophils # (Auto)


 


 


 0.1 x10^3/uL


(0.0-0.2) 





 


Reticulocyte Count (auto)


 


 


 3.3 %


(0.5-2.5) 





 


Sodium Level


 


 


 141 mmol/L


(136-145) 





 


Potassium Level


 


 


 3.8 mmol/L


(3.5-5.1) 





 


Chloride Level


 


 


 104 mmol/L


() 





 


Carbon Dioxide Level


 


 


 28 mmol/L


(21-32) 





 


Anion Gap   9 (6-14)  


 


Blood Urea Nitrogen   9 mg/dL (7-20)  


 


Creatinine


 


 


 0.8 mg/dL


(0.6-1.0) 





 


Estimated GFR


(Cockcroft-Gault) 


 


 68.7 


 





 


BUN/Creatinine Ratio   11 (6-20)  


 


Glucose Level


 


 


 114 mg/dL


(70-99) 





 


Calcium Level


 


 


 8.7 mg/dL


(8.5-10.1) 





 


Total Bilirubin


 


 


 0.4 mg/dL


(0.2-1.0) 





 


Aspartate Amino Transf


(AST/SGOT) 


 


 17 U/L (15-37) 


 





 


Alanine Aminotransferase


(ALT/SGPT) 


 


 17 U/L (14-59) 


 





 


Alkaline Phosphatase


 


 


 125 U/L


() 





 


Total Protein


 


 


 6.9 g/dL


(6.4-8.2) 





 


Albumin


 


 


 2.3 g/dL


(3.4-5.0) 





 


Albumin/Globulin Ratio   0.5 (1.0-1.7)  


 


Test


 8/10/18


11:50 


 


 





 


Glucose (Fingerstick)


 130 mg/dL


(70-99) 


 


 











Laboratory Tests








Test


 8/9/18


17:16 8/9/18


20:38 8/10/18


06:05 8/10/18


07:42


 


Glucose (Fingerstick)


 196 mg/dL


(70-99) 189 mg/dL


(70-99) 


 121 mg/dL


(70-99)


 


White Blood Count


 


 


 6.1 x10^3/uL


(4.0-11.0) 





 


Red Blood Count


 


 


 3.11 x10^6/uL


(3.50-5.40) 





 


Hemoglobin


 


 


 8.6 g/dL


(12.0-15.5) 





 


Hematocrit


 


 


 26.1 %


(36.0-47.0) 





 


Mean Corpuscular Volume   84 fL ()  


 


Mean Corpuscular Hemoglobin   28 pg (25-35)  


 


Mean Corpuscular Hemoglobin


Concent 


 


 33 g/dL


(31-37) 





 


Red Cell Distribution Width


 


 


 20.3 %


(11.5-14.5) 





 


Platelet Count


 


 


 361 x10^3/uL


(140-400) 





 


Neutrophils (%) (Auto)   65 % (31-73)  


 


Lymphocytes (%) (Auto)   22 % (24-48)  


 


Monocytes (%) (Auto)   10 % (0-9)  


 


Eosinophils (%) (Auto)   2 % (0-3)  


 


Basophils (%) (Auto)   1 % (0-3)  


 


Neutrophils # (Auto)


 


 


 4.0 x10^3uL


(1.8-7.7) 





 


Lymphocytes # (Auto)


 


 


 1.4 x10^3/uL


(1.0-4.8) 





 


Monocytes # (Auto)


 


 


 0.6 x10^3/uL


(0.0-1.1) 





 


Eosinophils # (Auto)


 


 


 0.1 x10^3/uL


(0.0-0.7) 





 


Basophils # (Auto)


 


 


 0.1 x10^3/uL


(0.0-0.2) 





 


Reticulocyte Count (auto)


 


 


 3.3 %


(0.5-2.5) 





 


Sodium Level


 


 


 141 mmol/L


(136-145) 





 


Potassium Level


 


 


 3.8 mmol/L


(3.5-5.1) 





 


Chloride Level


 


 


 104 mmol/L


() 





 


Carbon Dioxide Level


 


 


 28 mmol/L


(21-32) 





 


Anion Gap   9 (6-14)  


 


Blood Urea Nitrogen   9 mg/dL (7-20)  


 


Creatinine


 


 


 0.8 mg/dL


(0.6-1.0) 





 


Estimated GFR


(Cockcroft-Gault) 


 


 68.7 


 





 


BUN/Creatinine Ratio   11 (6-20)  


 


Glucose Level


 


 


 114 mg/dL


(70-99) 





 


Calcium Level


 


 


 8.7 mg/dL


(8.5-10.1) 





 


Total Bilirubin


 


 


 0.4 mg/dL


(0.2-1.0) 





 


Aspartate Amino Transf


(AST/SGOT) 


 


 17 U/L (15-37) 


 





 


Alanine Aminotransferase


(ALT/SGPT) 


 


 17 U/L (14-59) 


 





 


Alkaline Phosphatase


 


 


 125 U/L


() 





 


Total Protein


 


 


 6.9 g/dL


(6.4-8.2) 





 


Albumin


 


 


 2.3 g/dL


(3.4-5.0) 





 


Albumin/Globulin Ratio   0.5 (1.0-1.7)  


 


Test


 8/10/18


11:50 


 


 





 


Glucose (Fingerstick)


 130 mg/dL


(70-99) 


 


 











Microbiology


8/6/18 Blood Culture - Preliminary, Resulted


         NO GROWTH AFTER 4 DAYS


Medications





Current Medications


Sodium Chloride 1,000 ml @  125 mls/hr 1X  ONCE IV  Last administered on 8/3/

18at 19:50;  Start 8/3/18 at 15:45;  Stop 8/3/18 at 23:44;  Status DC


Ceftriaxone Sodium 1 gm/ Dextrose 50 ml @  100 mls/hr Q24H IV ;  Start 8/3/18 

at 15:45;  Status UNV


Ceftriaxone Sodium (Rocephin) 1 gm Q24H IVP  Last administered on 8/9/18at 17:23

;  Start 8/3/18 at 17:00


Acetaminophen (Tylenol) 1,000 mg BID PO  Last administered on 8/10/18at 08:57;  

Start 8/3/18 at 21:00


Alprazolam (Xanax) 0.25 mg PRN Q6HRS  PRN PO ANXIETY / AGITATION Last 

administered on 8/7/18at 23:28;  Start 8/3/18 at 18:45


Carvedilol (Coreg) 3.125 mg BIDWMEALS PO  Last administered on 8/10/18at 09:02;

  Start 8/3/18 at 21:00


Cholestyramine Resin (Questran Light) 4 gm DAILY PO  Last administered on 8/8/ 18at 08:57;  Start 8/4/18 at 09:00;  Stop 8/10/18 at 07:46;  Status DC


Citalopram Hydrobromide (CeleXA) 10 mg DAILY PO  Last administered on 8/10/18at 

08:59;  Start 8/4/18 at 09:00


Clopidogrel Bisulfate (Plavix) 75 mg DAILY PO  Last administered on 8/8/18at 08:

57;  Start 8/4/18 at 09:00;  Stop 8/9/18 at 15:20;  Status DC


Diphenhydramine HCl (Benadryl) 50 mg QHS PO  Last administered on 8/9/18at 21:24

;  Start 8/3/18 at 21:00


Docusate Sodium (Colace) 100 mg DAILY PO  Last administered on 8/10/18at 08:59;

  Start 8/4/18 at 09:00


Insulin Glargine (Lantus) 20 units DAILYWBKFT SQ  Last administered on 8/10/

18at 08:00;  Start 8/4/18 at 08:00


Isosorbide Mononitrate (Imdur) 30 mg DAILY PO  Last administered on 8/10/18at 09

:00;  Start 8/4/18 at 09:00


Lisinopril (Prinivil) 10 mg DAILY PO  Last administered on 8/10/18at 09:01;  

Start 8/4/18 at 09:00


Multivitamins/ Minerals (I-Josias) 1 tab BID PO  Last administered on 8/10/18at 08

:59;  Start 8/3/18 at 21:00


Non-Formulary Medication (Alendronate Sodium ) 70 mg WEEKLY PO ;  Start 8/10/18 

at 09:00;  Status UNV


Bumetanide (Bumex) 2 mg DAILY PO  Last administered on 8/10/18at 08:57;  Start 8 /4/18 at 09:00


Insulin Human Lispro (HumaLOG) 5 units TIDWMEALS SQ  Last administered on 8/10/

18at 12:31;  Start 8/4/18 at 08:00


Methotrexate (Rheumatrex) 15 mg WEEKLY PO  Last administered on 8/9/18at 09:00;

  Start 8/9/18 at 09:00


Fish Oil (Fish Oil) 1,000 mg DAILY PO  Last administered on 8/10/18at 08:58;  

Start 8/4/18 at 09:00


Pantoprazole Sodium (Protonix) 40 mg DAILYAC PO  Last administered on 8/10/18at 

09:00;  Start 8/4/18 at 07:30


Simvastatin (Zocor) 40 mg QHS PO  Last administered on 8/9/18at 21:24;  Start 8/

3/18 at 21:00


Warfarin Sodium (Coumadin) 7.5 mg DAILY16 PO ;  Start 8/4/18 at 16:00;  Stop 8/5 /18 at 11:25;  Status DC


Warfarin Sodium (Coumadin Per Physician) 1 each PRN DAILY  PRN MC SEE COMMENTS;

  Start 8/3/18 at 20:00;  Stop 8/9/18 at 15:20;  Status DC


Furosemide (Lasix) 40 mg 1X  ONCE IVP  Last administered on 8/5/18at 12:05;  

Start 8/5/18 at 12:00;  Stop 8/5/18 at 12:01;  Status DC


Magnesium Sulfate/ Dextrose 100 ml @  25 mls/hr 1X  ONCE IV  Last administered 

on 8/6/18at 12:16;  Start 8/6/18 at 13:00;  Stop 8/6/18 at 16:59;  Status DC


Potassium Chloride (Klor-Con) 40 meq 1X  ONCE PO  Last administered on 8/6/18at 

12:15;  Start 8/6/18 at 12:30;  Stop 8/6/18 at 12:31;  Status DC


Polyethylene Glycol (miraLAX Powder BULK BOTTLE) 238 gm 1X  ONCE PO  Last 

administered on 8/8/18at 13:55;  Start 8/8/18 at 13:00;  Stop 8/8/18 at 13:01;  

Status DC


Magnesium Citrate (Citroma) 296 ml 1X  ONCE PO  Last administered on 8/8/18at 12

:58;  Start 8/8/18 at 12:00;  Stop 8/8/18 at 12:01;  Status DC


Bisacodyl (Dulcolax Tab) 10 mg 1X  ONCE PO  Last administered on 8/8/18at 12:58

;  Start 8/8/18 at 12:30;  Stop 8/8/18 at 12:31;  Status DC


Bisacodyl (Dulcolax Tab) 10 mg 1X  ONCE PO  Last administered on 8/8/18at 13:55

;  Start 8/8/18 at 14:00;  Stop 8/8/18 at 14:01;  Status DC


Ringer's Solution 1,000 ml @  50 mls/hr Q20H IV ;  Start 8/9/18 at 07:00;  Stop 

8/9/18 at 18:59;  Status DC


Diphenhydramine HCl (Benadryl) 25 mg 1X  ONCE IVP  Last administered on 8/9/ 18at 09:30;  Start 8/9/18 at 09:30;  Stop 8/9/18 at 09:31;  Status DC


Lactobacillus Rhamnosus (Culturelle) 1 cap BID PO ;  Start 8/9/18 at 21:00;  

Status Cancel


Midazolam HCl (Versed) 2 mg PRN 1X  PRN IV PRIOR TO PROCEDURE;  Start 8/9/18 at 

13:00;  Stop 8/10/18 at 12:59;  Status DC


Fentanyl Citrate (Fentanyl 2ml Vial) 25 mcg PRN Q5MIN  PRN IV X 2 DOSES FOR PAIN

;  Start 8/9/18 at 13:00;  Stop 8/10/18 at 12:59;  Status DC


Fentanyl Citrate (Fentanyl 2ml Vial) 50 mcg PRN Q5MIN  PRN IV X 2 DOSES FOR PAIN

;  Start 8/9/18 at 13:00;  Stop 8/10/18 at 12:59;  Status DC


Ringer's Solution 1,000 ml @  125 mls/hr Q8H IV  Last administered on 8/9/18at 

13:12;  Start 8/9/18 at 12:54;  Stop 8/9/18 at 22:35;  Status DC


Lidocaine HCl (Xylocaine-Mpf 1% Vial) 2 ml 1X PRN  PRN ID IV START;  Start 8/9/ 18 at 13:00;  Stop 8/10/18 at 12:59;  Status DC


Aspirin (Ecotrin) 81 mg DAILYWBKFT PO  Last administered on 8/10/18at 08:58;  

Start 8/10/18 at 08:00


Iron Sucrose 500 mg/Sodium Chloride 275 ml @  78.571 mls/ hr 1X  ONCE IV  Last 

administered on 8/9/18at 17:30;  Start 8/9/18 at 17:30;  Stop 8/9/18 at 20:59;  

Status DC


Iohexol (Omnipaque 300 Mg/ml) 75 ml 1X  ONCE IV  Last administered on 8/10/18at 

06:15;  Start 8/10/18 at 06:15;  Stop 8/10/18 at 06:16;  Status DC


Iohexol (Omnipaque 240 Mg/ml) 50 ml 1X  ONCE PO  Last administered on 8/10/18at 

06:15;  Start 8/10/18 at 06:15;  Stop 8/10/18 at 06:16;  Status DC


Info (CONTRAST GIVEN -- Rx MONITORING) 1 each PRN DAILY  PRN MC SEE COMMENTS;  

Start 8/10/18 at 06:15;  Stop 8/12/18 at 06:14


Cholestyramine Resin (Questran Light) 4 gm DAILY PO ;  Start 8/10/18 at 13:00





Active Scripts


Active


Reported


Carvedilol 3.125 Mg Tablet 3.125 Mg PO BID


Methotrexate (Methotrexate Sodium) 2.5 Mg Tablet 6 Tab PO WEEKLY


Benadryl (Diphenhydramine Hcl) 25 Mg Capsule 2 Cap PO QHS


Bumetanide 2 Mg Tablet 2 Mg PO DAILY


Ocuvite Tablet (Vit A,C & E/Lutein/Minerals) 1 Each Tablet 1 Each PO BID


Prevalite Packet (Cholestyramine/Aspartame) 4 Gm Powd.pack 4 Gm PO 


Citalopram Hbr (Citalopram Hydrobromide) 10 Mg Tablet 10 Mg PO DAILY


Clopidogrel (Clopidogrel Bisulfate) 75 Mg Tablet 75 Mg PO DAILY


Alprazolam 0.25 Mg Tablet 0.25 Mg PO PRN Q6HRS PRN


Isosorbide Mononitrate Er (Isosorbide Mononitrate) 30 Mg Tab.er.24h 30 Mg PO 

DAILY


Prilosec Otc (Omeprazole Magnesium) 20 Mg Tablet.dr 20 Mg PO DAILY


Novolog Flexpen (Insulin Aspart) 100 Unit/1 Ml Insuln.pen 5 Unit SQ TIDBFRMEAL


Lantus Solostar (Insulin Glargine,Hum.rec.anlog) 100 Unit/1 Ml Insuln.pen 20 

Unit SQ DAILYWBKFT


Acetaminophen 500 Mg Tablet 2 Tab PO BID


Vitamin D-3 (Cholecalciferol (Vitamin D3)) 2,000 Unit Capsule 1,000 Unit PO 


Alendronate Sodium 70 Mg Tablet 70 Mg PO WEEKLY


Warfarin Sodium 5 Mg Tablet 1.5 Tab PO DAILY


Lisinopril 10 Mg Tablet 10 Mg PO DAILY


Simvastatin 40 Mg Tablet 40 Mg PO DAILY


Colace (Docusate Sodium) 100 Mg Capsule 100 Mg PO 


Garlic 1,000 Mg Capsule 1,000 Mg PO 


Multivitamins (Multivitamin) 1 Each Capsule 1 Each PO 


Willard 3 Fish Oil Softgel (Omega-3 Fatty Acids/Fish Oil) 1 Each Capsule. 1 

Each PO DAILY


Vitals/I & O





Vital Sign - Last 24 Hours








 8/9/18 8/9/18 8/9/18 8/9/18





 17:22 19:00 20:00 23:00


 


Temp  98.0  97.9





  98.0  97.9


 


Pulse 85 88  76


 


Resp  20  20


 


B/P (MAP) 158/53 146/46 (79)  139/47 (77)


 


Pulse Ox  97  93


 


O2 Delivery  Nasal Cannula Room Air Room Air


 


O2 Flow Rate  2.0 2.0 


 


    





    





 8/10/18 8/10/18 8/10/18 8/10/18





 02:59 07:00 08:00 09:00


 


Temp 97.4 97.5  





 97.4 97.5  


 


Pulse 88 79  79


 


Resp 18 24  


 


B/P (MAP) 143/61 (88) 154/56 (88)  154/56


 


Pulse Ox 97 96  


 


O2 Delivery Nasal Cannula Nasal Cannula Room Air 


 


O2 Flow Rate 2.0 2.0  


 


    





    





 8/10/18 8/10/18 8/10/18 8/10/18





 09:01 09:02 10:50 14:50


 


Temp   96.4 95.7





   96.4 95.7


 


Pulse 79 79 91 83


 


Resp   20 20


 


B/P (MAP) 154/56 154/56 149/52 (84) 125/41 (69)





    113/43 (66)


 


Pulse Ox   97 91


 


O2 Delivery   Nasal Cannula Room Air


 


O2 Flow Rate   2.0 














Intake and Output   


 


 8/9/18 8/9/18 8/10/18





 15:00 23:00 07:00


 


Intake Total 300 ml 630 ml 250 ml


 


Output Total  1 ml 


 


Balance 300 ml 629 ml 250 ml

















HERBERTH STEWART MD Aug 10, 2018 16:04

## 2018-08-10 NOTE — RAD
Radionuclide bone scan, 8/10/2018:

 

HISTORY: Esophageal cancer, anemia

 

Whole-body imaging was performed following IV injection of 26.9 mCi of 

technetium 99m MDP. No previous bone scan is available at this time for 

comparison purposes. The following findings are delineated:

 

1. Mildly increased activity at the right wrist, knees and shoulders is 

likely arthritic in nature.

2. Activity of the radionuclide about the skeleton and major joints is 

otherwise unremarkable.

3. Normal activity is present in both kidneys and the bladder.

 

IMPRESSION: No bone scan findings to suggest osseous metastatic disease.

 

Electronically signed by: Rick Moritz, MD (8/10/2018 1:29 PM) [facility]

## 2018-08-10 NOTE — CONS
DATE OF CONSULTATION:  08/09/2018



REQUESTING PHYSICIAN:  Dr. Bruce Zimmer.



REASON FOR CONSULTATION:  Possible esophageal cancer.



HISTORY OF PRESENT ILLNESS:  The patient is an 82-year-old  female who

was seen by Dr. Bernardo in 07/2018 with complaints of shortness of breath with

exertion and chest discomfort.  Workup at that point revealed that she was

anemic with hemoglobin of 8.6.  Her symptoms got worse and hence she was

admitted to [facility] on 08/03/2018 when her hemoglobin was

noted to be 8.0.  Iron studies revealed decreased iron saturation of 7% with an

iron level of 15 and TIBC 216 and ferritin of 141 on 08/03/2018.  GI

consultation was obtained and she underwent upper endoscopy on 08/09/2018 which

revealed multiple nodules in the esophagus with an area on the posterior wall at

31 cm suggestive of malignancy and biopsies were obtained.  I was asked to see

the patient for further evaluation of possible malignancy and its management.



The patient does report dysphagia to solid foods.  She reports that she has not

had any significant weight loss or loss of appetite.



PAST MEDICAL HISTORY:  TIA, coronary artery disease, history of prior stent

placement and CABG.  She has hyperlipidemia, hypertension, osteoarthritis,

breast cancer, diabetes mellitus, anxiety, history of left lumpectomy and right

mastectomy.



FAMILY HISTORY:  Negative for any esophageal cancer.



SOCIAL HISTORY:  No smoking or alcohol abuse.



REVIEW OF SYSTEMS:  A 12-point review of system was performed.  Pertinent

positives are mentioned in the history of present illness.  Rest of the system

review is negative.



PHYSICAL EXAMINATION:

GENERAL APPEARANCE:  The patient is an 82-year-old  female who is in no

acute cardiorespiratory distress.

VITAL SIGNS:  Blood pressure 158/53, temperature 97.5.

HEENT:  Head atraumatic, normocephalic.  Eyes, no icterus.

NECK:  Supple.

CHEST:  Bilaterally symmetrical.

HEART:  S1, S2 normal.

ABDOMEN:  Soft, nontender.

CENTRAL NERVOUS SYSTEM:  No focal deficits.

LYMPHATICS:  No lymphadenopathy.

SKIN:  No rashes.

PSYCHOLOGIC:  Mood and affect are appropriate.

MUSCULOSKELETAL:  No joint effusions.



LABORATORY DATA:  WBC 8.0, hemoglobin 8, and platelet count 369 on 08/03/2018

with a ferritin of 141, iron saturation 7%, iron level of 15, TIBC 216.



Sodium 136, potassium 3.7, creatinine 1.0, calcium 7.7.  Alkaline phosphatase

157, albumin 2.4.



IMPRESSION AND PLAN:

1.  Esophageal nodules clinically concerning for esophageal cancer noted on EGD

done on 08/09/2018.  Biopsies have been obtained and results are pending.  I

would proceed with staging workup with a CT chest, abdomen and pelvis and a bone

scan.  Considering her age and comorbidities, treatment options would be

definitive radiation therapy versus combined chemoradiation.  I will also

consult Radiation Oncology for an opinion.  I discussed in detail with the

patient.  She understands and agrees with the plan.

2.  Anemia.  Iron deficiency is noted with very decreased iron saturation, but

the ferritin is normal.  I will proceed with Venofer 500 mg IV x 1 dose.

3.  Coronary artery disease.  Her ejection fraction on echocardiogram done on

08/06/2018 was only 30-35%.  Appreciate Cardiology management.

 



______________________________

HERBERTH STEWART MD



DR:  JOSEPH/tu  JOB#:  [id number] / [id number]

DD:  08/09/2018 17:14  DT:  08/10/2018 03:26

## 2018-08-10 NOTE — PDOC
CARDIO Progress Notes


Date and Time


Date of Service


8/10/2018


Time of Evaluation


1300





Subjective


Subjective:  No Chest Pain, No shortness of breath, No Palpitations





Vitals


Vitals





Vital Signs








  Date Time  Temp Pulse Resp B/P (MAP) Pulse Ox O2 Delivery O2 Flow Rate FiO2


 


8/10/18 10:50 96.4 91 20 149/52 (84) 97 Nasal Cannula 2.0 





 96.4       








Weight


Weight [ ]





Input and Output


Intake and Output











Intake and Output 


 


 8/10/18





 07:00


 


Intake Total 1180 ml


 


Output Total 1 ml


 


Balance 1179 ml


 


 


 


Intake Oral 880 ml


 


IV Total 300 ml


 


Urine/Stool Mix 1 ml


 


# Voids 4











Laboratory


Labs





Laboratory Tests








Test


 8/9/18


17:16 8/9/18


20:38 8/10/18


06:05 8/10/18


07:42


 


Glucose (Fingerstick)


 196 mg/dL


(70-99) 189 mg/dL


(70-99) 


 121 mg/dL


(70-99)


 


White Blood Count


 


 


 6.1 x10^3/uL


(4.0-11.0) 





 


Red Blood Count


 


 


 3.11 x10^6/uL


(3.50-5.40) 





 


Hemoglobin


 


 


 8.6 g/dL


(12.0-15.5) 





 


Hematocrit


 


 


 26.1 %


(36.0-47.0) 





 


Mean Corpuscular Volume   84 fL ()  


 


Mean Corpuscular Hemoglobin   28 pg (25-35)  


 


Mean Corpuscular Hemoglobin


Concent 


 


 33 g/dL


(31-37) 





 


Red Cell Distribution Width


 


 


 20.3 %


(11.5-14.5) 





 


Platelet Count


 


 


 361 x10^3/uL


(140-400) 





 


Neutrophils (%) (Auto)   65 % (31-73)  


 


Lymphocytes (%) (Auto)   22 % (24-48)  


 


Monocytes (%) (Auto)   10 % (0-9)  


 


Eosinophils (%) (Auto)   2 % (0-3)  


 


Basophils (%) (Auto)   1 % (0-3)  


 


Neutrophils # (Auto)


 


 


 4.0 x10^3uL


(1.8-7.7) 





 


Lymphocytes # (Auto)


 


 


 1.4 x10^3/uL


(1.0-4.8) 





 


Monocytes # (Auto)


 


 


 0.6 x10^3/uL


(0.0-1.1) 





 


Eosinophils # (Auto)


 


 


 0.1 x10^3/uL


(0.0-0.7) 





 


Basophils # (Auto)


 


 


 0.1 x10^3/uL


(0.0-0.2) 





 


Reticulocyte Count (auto)


 


 


 3.3 %


(0.5-2.5) 





 


Sodium Level


 


 


 141 mmol/L


(136-145) 





 


Potassium Level


 


 


 3.8 mmol/L


(3.5-5.1) 





 


Chloride Level


 


 


 104 mmol/L


() 





 


Carbon Dioxide Level


 


 


 28 mmol/L


(21-32) 





 


Anion Gap   9 (6-14)  


 


Blood Urea Nitrogen   9 mg/dL (7-20)  


 


Creatinine


 


 


 0.8 mg/dL


(0.6-1.0) 





 


Estimated GFR


(Cockcroft-Gault) 


 


 68.7 


 





 


BUN/Creatinine Ratio   11 (6-20)  


 


Glucose Level


 


 


 114 mg/dL


(70-99) 





 


Calcium Level


 


 


 8.7 mg/dL


(8.5-10.1) 





 


Total Bilirubin


 


 


 0.4 mg/dL


(0.2-1.0) 





 


Aspartate Amino Transf


(AST/SGOT) 


 


 17 U/L (15-37) 


 





 


Alanine Aminotransferase


(ALT/SGPT) 


 


 17 U/L (14-59) 


 





 


Alkaline Phosphatase


 


 


 125 U/L


() 





 


Total Protein


 


 


 6.9 g/dL


(6.4-8.2) 





 


Albumin


 


 


 2.3 g/dL


(3.4-5.0) 





 


Albumin/Globulin Ratio   0.5 (1.0-1.7)  


 


Test


 8/10/18


11:50 


 


 





 


Glucose (Fingerstick)


 130 mg/dL


(70-99) 


 


 














Microbiology


Micro





Microbiology


8/6/18 Blood Culture - Preliminary, Resulted


         NO GROWTH AFTER 4 DAYS





Physical Exam


HEENT:  Neck Supple W Full Motion


Chest:  Symmetric


LUNGS:  Clear to Auscultation


Heart:  irregularly irregular (AFIB rate controlled)


Abdomen:  Soft N/T


Extremities:  No Calf Tenderness


Neurology:  alert, oriented, follow commands





Assessment


Assessment


1.  AFIB: persistent. remains rate controlled. 


2.  Hx of CVA


3.  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 

at 35-40%. Compensated


4.  CAD with previous CABG x4 on 8/11/2009, LIMA to LAD and Diagonal as Y graft

, SVG to OM/RCA. Stable no cardiac symptoms. 


5.  HTN: controlled


6.  HLP: well controlled


7.  Anemia with GI bleed: due to #8.  Hgb mean at 7-8s currently


8.  Esophageal nodules suggesting malignancy: Possible AVM as well per GI. 

Hemonc following. 


9.  DM2





Recommendations


1. Continue with current cardiac regimen/secondary prevention measures 

including diuretic. 


2. Pt wishes to follow up in our office as an outpt and could be seen in [facility]F 

clinic in 1-2 weeks. 


3. Discussed with GI and would prefer to be off plavix and anticoagulation.  

Continue with ASA for stroke prevention. 


4. Awaiting biopsies and potential treatment plan for pt.  Will equate CA 

treatment plan, life expectancy in regards to pursuing future cardiac 

intervention measures such as ischemic workup, AICD placement


5. FR 2L and daily wt. 


6. Discussed with pharmacy and will change the time for derek.











LEWIS GUTIERREZ Aug 10, 2018 13:38

## 2018-08-11 VITALS — DIASTOLIC BLOOD PRESSURE: 41 MMHG | SYSTOLIC BLOOD PRESSURE: 103 MMHG

## 2018-08-11 VITALS — DIASTOLIC BLOOD PRESSURE: 49 MMHG | SYSTOLIC BLOOD PRESSURE: 141 MMHG

## 2018-08-11 VITALS — SYSTOLIC BLOOD PRESSURE: 154 MMHG | DIASTOLIC BLOOD PRESSURE: 57 MMHG

## 2018-08-11 VITALS — SYSTOLIC BLOOD PRESSURE: 128 MMHG | DIASTOLIC BLOOD PRESSURE: 46 MMHG

## 2018-08-11 VITALS — DIASTOLIC BLOOD PRESSURE: 45 MMHG | SYSTOLIC BLOOD PRESSURE: 127 MMHG

## 2018-08-11 VITALS — SYSTOLIC BLOOD PRESSURE: 119 MMHG | DIASTOLIC BLOOD PRESSURE: 38 MMHG

## 2018-08-11 RX ADMIN — CARVEDILOL SCH MG: 3.12 TABLET, FILM COATED ORAL at 17:26

## 2018-08-11 RX ADMIN — I-VITE, TAB 1000-60-2MG (60/BT) SCH TAB: TAB at 20:48

## 2018-08-11 RX ADMIN — Medication SCH MG: at 08:34

## 2018-08-11 RX ADMIN — BUMETANIDE SCH MG: 1 TABLET ORAL at 08:37

## 2018-08-11 RX ADMIN — INSULIN LISPRO SCH UNITS: 100 INJECTION, SOLUTION INTRAVENOUS; SUBCUTANEOUS at 08:54

## 2018-08-11 RX ADMIN — CITALOPRAM HYDROBROMIDE SCH MG: 10 TABLET ORAL at 08:38

## 2018-08-11 RX ADMIN — CARVEDILOL SCH MG: 3.12 TABLET, FILM COATED ORAL at 08:37

## 2018-08-11 RX ADMIN — ASPIRIN SCH MG: 81 TABLET, COATED ORAL at 08:36

## 2018-08-11 RX ADMIN — INSULIN LISPRO SCH UNITS: 100 INJECTION, SOLUTION INTRAVENOUS; SUBCUTANEOUS at 17:35

## 2018-08-11 RX ADMIN — LISINOPRIL SCH MG: 10 TABLET ORAL at 08:36

## 2018-08-11 RX ADMIN — INSULIN GLARGINE SCH UNITS: 100 INJECTION, SOLUTION SUBCUTANEOUS at 08:55

## 2018-08-11 RX ADMIN — I-VITE, TAB 1000-60-2MG (60/BT) SCH TAB: TAB at 12:01

## 2018-08-11 RX ADMIN — CEFTRIAXONE SCH GM: 1 INJECTION, POWDER, FOR SOLUTION INTRAMUSCULAR; INTRAVENOUS at 17:26

## 2018-08-11 RX ADMIN — ACETAMINOPHEN SCH MG: 500 TABLET ORAL at 20:47

## 2018-08-11 RX ADMIN — SIMVASTATIN SCH MG: 40 TABLET, FILM COATED ORAL at 20:48

## 2018-08-11 RX ADMIN — DOCUSATE SODIUM SCH MG: 100 CAPSULE, LIQUID FILLED ORAL at 08:36

## 2018-08-11 RX ADMIN — ISOSORBIDE MONONITRATE SCH MG: 30 TABLET, EXTENDED RELEASE ORAL at 08:38

## 2018-08-11 RX ADMIN — ACETAMINOPHEN SCH MG: 500 TABLET ORAL at 08:35

## 2018-08-11 RX ADMIN — PANTOPRAZOLE SODIUM SCH MG: 40 TABLET, DELAYED RELEASE ORAL at 08:34

## 2018-08-11 RX ADMIN — INSULIN LISPRO SCH UNITS: 100 INJECTION, SOLUTION INTRAVENOUS; SUBCUTANEOUS at 12:04

## 2018-08-11 RX ADMIN — CHOLESTYRAMINE SCH GM: 4 POWDER, FOR SUSPENSION ORAL at 08:39

## 2018-08-11 NOTE — PDOC
SUBJECTIVE


Subjective


Jeniffer is in good spirits. We discussed how to make a quick pie - whipped cream

, condensed milk and koolaid mixed into a boyd cracker crust. Black raspberry 

is her favorite. No concerns today. Awaiting results.





OBJECTIVE


Objective


Reviewed.


Vital Signs





Vital Signs








  Date Time  Temp Pulse Resp B/P (MAP) Pulse Ox O2 Delivery O2 Flow Rate FiO2


 


8/11/18 07:36 97.7 79 18 141/49 (79) 98 Nasal Cannula  





 97.7       


 


8/11/18 03:30 98.0 70 18 128/46 (73) 94 Room Air  





 98.0       


 


8/10/18 23:50 97.8 85 18 131/51 (77) 93 Room Air  





 97.8       


 


8/10/18 20:00      Room Air 2.0 


 


8/10/18 19:54 97.5 80 20 118/45 (69) 94 Room Air  





 97.5       


 


8/10/18 17:49  83  113/43    


 


8/10/18 14:50 95.7 83 20 125/41 (69) 91 Room Air  





 95.7   113/43 (66)    


 


8/10/18 10:50 96.4 91 20 149/52 (84) 97 Nasal Cannula 2.0 





 96.4       


 


8/10/18 09:02  79  154/56    


 


8/10/18 09:01  79  154/56    


 


8/10/18 09:00  79  154/56    








I & O











Intake and Output 


 


 8/11/18





 07:00


 


Intake Total 1350 ml


 


Balance 1350 ml


 


 


 


Intake Oral 1350 ml


 


# Voids 7











PHYSICAL EXAM


Physical Exam


Alert, oriented, smiling


RRR


CTAB


No edema in b/l LEs





ASSESSMENT/PLAN


Assessment/Plan


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 

imaging


Normocytic anemia, hx of Iron deficiency anemia


Congestive heart failure, not in acute exacerbation


Atrial fibrillation


Cardiomyopathy.


Diabetes - blood sugars well controlled


Coagulopathy, resolved with holding Coumadin.








Await biopsy results and further work up as indicated





COMMENT


Lab





Laboratory Tests








Test


 8/10/18


11:50 8/10/18


17:03 8/10/18


20:16 8/11/18


07:02


 


Glucose (Fingerstick)


 130 mg/dL


(70-99) 88 mg/dL


(70-99) 105 mg/dL


(70-99) 91 mg/dL


(70-99)

















MARGUERITE JUAREZ MD Aug 11, 2018 08:12

## 2018-08-11 NOTE — CONS
DATE OF CONSULTATION:  8/10/2018



REFERRING PHYSICIAN:  Cody Champion MD



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  She

underwent endoscopy and biopsy of a posterior wall lesion at 31 cm suggested for

malignancy on 2018. Result of her biopsy is in progress.  We were asked

to see her regarding the role of radiation treatment in her care.



ICD 10 15.5



The patient is an 82-year-old woman who was admitted here from Dr. Marshall Bernardo's office with complaints of shortness of breath, fatigue and absent of

energy over a 2-week period.  She felt like she was going to pass out.  She was

found to be anemic with a hemoglobin 8.6.  She appeared ill in his office and

was admitted for further evaluation.



She underwent upper and lower endoscopy by Dr. Bruce Mckenzie on 2018. 

She had multiple nodules at 30-35 cm from the incisors.  An area on the

posterior wall at 31 cm from the incisors was suggested for malignancy. 

Multiple biopsies were taken.  This was noted to be just below the

squamocolumnar junction.



Colonoscopy revealed normal mucosa with no polyps, masses.



She underwent baseline CT scan of the chest, abdomen and pelvis on 08/10/2018. 

This revealed multiple mediastinal lymph nodes and  paraesophageal lymph

nodes reactive or metastatic.  Scattered multiple lung nodules measuring up to

5.5 mm in size in the right apical region, mild thickening of the distal

esophagus.



She notes some difficulty in swallowing solid foods requiring her to drink with

swallowing.



She has had variable weight.   passed away in 2018 and now she is

recovering with improved appetite.



PAST MEDICAL HISTORY:  Remarkable for hyperlipidemia, hypertension, right-sided

breast cancer treated with mastectomy in  requiring no adjuvant radiation or

chemotherapy, coronary artery disease status post coronary artery bypass graft

surgery in , followed by 3 stent placements, diabetes mellitus,

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

followup.



ALLERGIES:  MILK AND MONTELUKAST.



MEDICATIONS:  Furosemide See hospital chart for current medication list.



SOCIAL HISTORY:   in 2018.   had a previous history of

non-Hodgkin lymphoma and  from renal failure at age 88.  No living children,

3  in childbirth.  She enjoys crafts.  She lives alone.  She is accompanied

by her niece, Ria Fox, [phone].  She has no other close family here. 

She has a distant history of smoking and quit in .  Social drinker.  She

does not drive a car.  Her niece provides transportation.



PHYSICAL EXAMINATION:

GENERAL:  Revealed a pleasant, alert, cooperative woman in no acute distress.

HEENT:  Unremarkable.

LYMPH NODES:  She had no palpable cervical, supraclavicular or axillary

adenopathy.

LUNGS:  Clear.

HEART:  Regular.

ABDOMEN:  Unremarkable.

EXTREMITIES:  She had minimal pedal edema.  No clubbing or cyanosis.

NEUROLOGIC:  Revealed no deficits.



LABORATORY STUDIES:  CBC from 08/10/2018:  Hemoglobin 8.6, white count 6100,

platelet count 361,000.  Chemistry panel from that day was unremarkable, glucose

114, creatinine 0.8, AST 17, ALT 17, alkaline phosphatase 125, albumin 2.3.



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  She has mediastinal and

paraesophageal lymph nodes of uncertain significance.  At this time, we await

her biopsy outcome.



In the event the biopsy does confirm malignancy, we would then complete her

staging with outpatient PET CT scan imaging to further assess her extent of her

local disease and to assess the significance of her adenopathy seen on initial

staging CT scan. Her pulmonary nodules are below the threshold of PET/CT

imaging and will likely remain indeterminant in etiology and will need to be 
followed.



In the event she has regionally confined disease, she would then be considered

for radiation and chemotherapy as primary treatment.  Her age and comorbidities

make her a poor candidate for trimodality treatment with chemoradiation followed

by surgical resection.  I discussed this in detail with the patient, regarding

the need for biopsy confirmation and PET CT scan imaging in the event the biopsy

does confirm malignancy.



Thank you again for allowing us to participate in her evaluation.

 



______________________________

DIANA EUGENE MD



DR:  MADAI/tu  JOB#:  [id number] / [id number]

DD:  08/10/2018 15:59  DT:  2018 06:13



AMRSHALL Steven MD, BART MCKENZIE, BRUCE CARDONA

## 2018-08-12 VITALS — DIASTOLIC BLOOD PRESSURE: 41 MMHG | SYSTOLIC BLOOD PRESSURE: 135 MMHG

## 2018-08-12 VITALS — DIASTOLIC BLOOD PRESSURE: 44 MMHG | SYSTOLIC BLOOD PRESSURE: 119 MMHG

## 2018-08-12 VITALS — SYSTOLIC BLOOD PRESSURE: 137 MMHG | DIASTOLIC BLOOD PRESSURE: 47 MMHG

## 2018-08-12 VITALS — SYSTOLIC BLOOD PRESSURE: 138 MMHG | DIASTOLIC BLOOD PRESSURE: 51 MMHG

## 2018-08-12 VITALS — SYSTOLIC BLOOD PRESSURE: 154 MMHG | DIASTOLIC BLOOD PRESSURE: 41 MMHG

## 2018-08-12 VITALS — DIASTOLIC BLOOD PRESSURE: 48 MMHG | SYSTOLIC BLOOD PRESSURE: 144 MMHG

## 2018-08-12 LAB
ALBUMIN SERPL-MCNC: 2.3 G/DL (ref 3.4–5)
ALBUMIN/GLOB SERPL: 0.5 {RATIO} (ref 1–1.7)
ALP SERPL-CCNC: 111 U/L (ref 46–116)
ALT SERPL-CCNC: 17 U/L (ref 14–59)
ANION GAP SERPL CALC-SCNC: 6 MMOL/L (ref 6–14)
AST SERPL-CCNC: 21 U/L (ref 15–37)
BASOPHILS # BLD AUTO: 0.1 X10^3/UL (ref 0–0.2)
BASOPHILS NFR BLD: 1 % (ref 0–3)
BILIRUB SERPL-MCNC: 0.5 MG/DL (ref 0.2–1)
BUN SERPL-MCNC: 13 MG/DL (ref 7–20)
BUN/CREAT SERPL: 16 (ref 6–20)
CALCIUM SERPL-MCNC: 8.5 MG/DL (ref 8.5–10.1)
CHLORIDE SERPL-SCNC: 102 MMOL/L (ref 98–107)
CO2 SERPL-SCNC: 29 MMOL/L (ref 21–32)
CREAT SERPL-MCNC: 0.8 MG/DL (ref 0.6–1)
EOSINOPHIL NFR BLD: 0.1 X10^3/UL (ref 0–0.7)
EOSINOPHIL NFR BLD: 2 % (ref 0–3)
ERYTHROCYTE [DISTWIDTH] IN BLOOD BY AUTOMATED COUNT: 20.7 % (ref 11.5–14.5)
GFR SERPLBLD BASED ON 1.73 SQ M-ARVRAT: 68.7 ML/MIN
GLOBULIN SER-MCNC: 4.4 G/DL (ref 2.2–3.8)
GLUCOSE SERPL-MCNC: 100 MG/DL (ref 70–99)
HCT VFR BLD CALC: 25.3 % (ref 36–47)
HGB BLD-MCNC: 8.2 G/DL (ref 12–15.5)
LYMPHOCYTES # BLD: 1.4 X10^3/UL (ref 1–4.8)
LYMPHOCYTES NFR BLD AUTO: 20 % (ref 24–48)
MCH RBC QN AUTO: 27 PG (ref 25–35)
MCHC RBC AUTO-ENTMCNC: 32 G/DL (ref 31–37)
MCV RBC AUTO: 84 FL (ref 79–100)
MONO #: 0.5 X10^3/UL (ref 0–1.1)
MONOCYTES NFR BLD: 7 % (ref 0–9)
NEUT #: 5.2 X10^3UL (ref 1.8–7.7)
NEUTROPHILS NFR BLD AUTO: 71 % (ref 31–73)
PLATELET # BLD AUTO: 364 X10^3/UL (ref 140–400)
POTASSIUM SERPL-SCNC: 3.7 MMOL/L (ref 3.5–5.1)
PROT SERPL-MCNC: 6.7 G/DL (ref 6.4–8.2)
RBC # BLD AUTO: 3 X10^6/UL (ref 3.5–5.4)
SODIUM SERPL-SCNC: 137 MMOL/L (ref 136–145)
WBC # BLD AUTO: 7.3 X10^3/UL (ref 4–11)

## 2018-08-12 RX ADMIN — Medication SCH MG: at 08:15

## 2018-08-12 RX ADMIN — CEFTRIAXONE SCH GM: 1 INJECTION, POWDER, FOR SOLUTION INTRAMUSCULAR; INTRAVENOUS at 17:00

## 2018-08-12 RX ADMIN — BUMETANIDE SCH MG: 1 TABLET ORAL at 08:14

## 2018-08-12 RX ADMIN — CHOLESTYRAMINE SCH GM: 4 POWDER, FOR SUSPENSION ORAL at 08:18

## 2018-08-12 RX ADMIN — PANTOPRAZOLE SODIUM SCH MG: 40 TABLET, DELAYED RELEASE ORAL at 08:19

## 2018-08-12 RX ADMIN — CARVEDILOL SCH MG: 3.12 TABLET, FILM COATED ORAL at 08:16

## 2018-08-12 RX ADMIN — DOCUSATE SODIUM SCH MG: 100 CAPSULE, LIQUID FILLED ORAL at 08:18

## 2018-08-12 RX ADMIN — INSULIN LISPRO SCH UNITS: 100 INJECTION, SOLUTION INTRAVENOUS; SUBCUTANEOUS at 12:27

## 2018-08-12 RX ADMIN — I-VITE, TAB 1000-60-2MG (60/BT) SCH TAB: TAB at 08:15

## 2018-08-12 RX ADMIN — ACETAMINOPHEN SCH MG: 500 TABLET ORAL at 08:17

## 2018-08-12 RX ADMIN — ACETAMINOPHEN SCH MG: 500 TABLET ORAL at 21:00

## 2018-08-12 RX ADMIN — LISINOPRIL SCH MG: 10 TABLET ORAL at 08:16

## 2018-08-12 RX ADMIN — INSULIN LISPRO SCH UNITS: 100 INJECTION, SOLUTION INTRAVENOUS; SUBCUTANEOUS at 17:40

## 2018-08-12 RX ADMIN — SIMVASTATIN SCH MG: 40 TABLET, FILM COATED ORAL at 21:04

## 2018-08-12 RX ADMIN — I-VITE, TAB 1000-60-2MG (60/BT) SCH TAB: TAB at 21:04

## 2018-08-12 RX ADMIN — ASPIRIN SCH MG: 81 TABLET, COATED ORAL at 08:16

## 2018-08-12 RX ADMIN — CITALOPRAM HYDROBROMIDE SCH MG: 10 TABLET ORAL at 08:16

## 2018-08-12 RX ADMIN — ISOSORBIDE MONONITRATE SCH MG: 30 TABLET, EXTENDED RELEASE ORAL at 08:15

## 2018-08-12 RX ADMIN — CARVEDILOL SCH MG: 3.12 TABLET, FILM COATED ORAL at 17:30

## 2018-08-12 RX ADMIN — HEPARIN SODIUM AND DEXTROSE PRN MLS/HR: 5000; 5 INJECTION INTRAVENOUS at 19:47

## 2018-08-12 NOTE — RAD
CT of the head without contrast, 8/12/2018:

 

HISTORY: Left-sided weakness, slurred speech

 

There is mild cerebral atrophy. There are mild patchy lucencies in the 

deep white matter bilaterally compatible with chronic ischemic change. 

There are small focal lucencies in the medial aspects of both cerebellar 

hemispheres compatible with encephalomalacia due to old infarcts. The 

ventricles are within normal limits in size. There is no shift of the 

midline structures. There is no evidence of acute intracranial hemorrhage 

or mass effect.

 

IMPRESSION:

1. Chronic findings as described above.

2. No acute intracranial abnormality is detected.

 

Note: The findings were called to the patient's nurse on the floor at 3:32

PM on 8/12/2018.

 

 

 

PQRS Compliance Statement:

 

One or more of the following individualized dose reduction techniques were

utilized for this examination:  

1. Automated exposure control  

2. Adjustment of the mA and/or kV according to patient size  

3. Use of iterative reconstruction technique

 

Electronically signed by: Rick Moritz, MD (8/12/2018 3:32 PM) [facility]

## 2018-08-13 VITALS — DIASTOLIC BLOOD PRESSURE: 40 MMHG | SYSTOLIC BLOOD PRESSURE: 129 MMHG

## 2018-08-13 VITALS — DIASTOLIC BLOOD PRESSURE: 50 MMHG | SYSTOLIC BLOOD PRESSURE: 134 MMHG

## 2018-08-13 VITALS — DIASTOLIC BLOOD PRESSURE: 48 MMHG | SYSTOLIC BLOOD PRESSURE: 140 MMHG

## 2018-08-13 VITALS — DIASTOLIC BLOOD PRESSURE: 45 MMHG | SYSTOLIC BLOOD PRESSURE: 123 MMHG

## 2018-08-13 VITALS — SYSTOLIC BLOOD PRESSURE: 124 MMHG | DIASTOLIC BLOOD PRESSURE: 42 MMHG

## 2018-08-13 VITALS — SYSTOLIC BLOOD PRESSURE: 124 MMHG | DIASTOLIC BLOOD PRESSURE: 45 MMHG

## 2018-08-13 LAB
ERYTHROCYTE [DISTWIDTH] IN BLOOD BY AUTOMATED COUNT: 20.6 % (ref 11.5–14.5)
HCT VFR BLD CALC: 24 % (ref 36–47)
HGB BLD-MCNC: 8 G/DL (ref 12–15.5)
MCH RBC QN AUTO: 28 PG (ref 25–35)
MCHC RBC AUTO-ENTMCNC: 33 G/DL (ref 31–37)
MCV RBC AUTO: 84 FL (ref 79–100)
PLATELET # BLD AUTO: 344 X10^3/UL (ref 140–400)
RBC # BLD AUTO: 2.87 X10^6/UL (ref 3.5–5.4)
WBC # BLD AUTO: 6.7 X10^3/UL (ref 4–11)

## 2018-08-13 RX ADMIN — ISOSORBIDE MONONITRATE SCH MG: 30 TABLET, EXTENDED RELEASE ORAL at 08:56

## 2018-08-13 RX ADMIN — CARVEDILOL SCH MG: 3.12 TABLET, FILM COATED ORAL at 08:55

## 2018-08-13 RX ADMIN — DOCUSATE SODIUM SCH MG: 100 CAPSULE, LIQUID FILLED ORAL at 08:56

## 2018-08-13 RX ADMIN — INSULIN LISPRO SCH UNITS: 100 INJECTION, SOLUTION INTRAVENOUS; SUBCUTANEOUS at 17:00

## 2018-08-13 RX ADMIN — Medication PRN EACH: at 13:07

## 2018-08-13 RX ADMIN — CHOLESTYRAMINE SCH GM: 4 POWDER, FOR SUSPENSION ORAL at 09:00

## 2018-08-13 RX ADMIN — HEPARIN SODIUM AND DEXTROSE PRN MLS/HR: 5000; 5 INJECTION INTRAVENOUS at 12:23

## 2018-08-13 RX ADMIN — SIMVASTATIN SCH MG: 40 TABLET, FILM COATED ORAL at 20:55

## 2018-08-13 RX ADMIN — INSULIN LISPRO SCH UNITS: 100 INJECTION, SOLUTION INTRAVENOUS; SUBCUTANEOUS at 17:36

## 2018-08-13 RX ADMIN — CITALOPRAM HYDROBROMIDE SCH MG: 10 TABLET ORAL at 08:55

## 2018-08-13 RX ADMIN — INSULIN LISPRO SCH UNITS: 100 INJECTION, SOLUTION INTRAVENOUS; SUBCUTANEOUS at 09:07

## 2018-08-13 RX ADMIN — INSULIN GLARGINE SCH UNITS: 100 INJECTION, SOLUTION SUBCUTANEOUS at 09:06

## 2018-08-13 RX ADMIN — ACETAMINOPHEN SCH MG: 500 TABLET ORAL at 20:55

## 2018-08-13 RX ADMIN — I-VITE, TAB 1000-60-2MG (60/BT) SCH TAB: TAB at 20:55

## 2018-08-13 RX ADMIN — CEFTRIAXONE SCH GM: 1 INJECTION, POWDER, FOR SOLUTION INTRAMUSCULAR; INTRAVENOUS at 17:11

## 2018-08-13 RX ADMIN — ACETAMINOPHEN SCH MG: 500 TABLET ORAL at 09:00

## 2018-08-13 RX ADMIN — Medication SCH MG: at 08:57

## 2018-08-13 RX ADMIN — I-VITE, TAB 1000-60-2MG (60/BT) SCH TAB: TAB at 08:56

## 2018-08-13 RX ADMIN — BUMETANIDE SCH MG: 1 TABLET ORAL at 08:54

## 2018-08-13 RX ADMIN — INSULIN LISPRO SCH UNITS: 100 INJECTION, SOLUTION INTRAVENOUS; SUBCUTANEOUS at 12:24

## 2018-08-13 RX ADMIN — CARVEDILOL SCH MG: 3.12 TABLET, FILM COATED ORAL at 17:11

## 2018-08-13 RX ADMIN — ASPIRIN SCH MG: 81 TABLET, COATED ORAL at 08:56

## 2018-08-13 RX ADMIN — LISINOPRIL SCH MG: 10 TABLET ORAL at 08:55

## 2018-08-13 RX ADMIN — PANTOPRAZOLE SODIUM SCH MG: 40 TABLET, DELAYED RELEASE ORAL at 08:55

## 2018-08-13 NOTE — PDOC2
NEUROLOGY CONSULT


Date of Admission


Date of Admission


DATE: 8/13/18 


TIME: 16:03





Reason for Consult


Reason for Consult:


IMPRESSION:


Left side heaviness/weakness.


Falls.


Acute small right parietal lobe, left frontal and parieto-occipital lobe 

infarcts.


Brain metastatic diseases on differential.


Multiple mediastinal lymph nodes, metastatic ?


Multiple lung nodules.


Chest pain.


AFib.


DM.


Respiratory distress.


Right side breast cancer s/p right mastectomy and left lumpectomy.


CAD/sp stents placement.


CHF, EF 30-35%.


Pulmonary hypertension, PA pressure 53.


Anemia.


Obesity.





RECOMMENDATIONS/PLAN:


She has been on Heparin and ASA 81 mg daily.


Continue Zocor 40 mg HS.


Brain MRI w contrast to help rule out metastatic diseases.


Carotid A US + Doppler.





Echo on 8/6/18: see above reports.


Lipids: WNL.





HISTORY OF THE PRESENT ILLNESS:


82-y-old  female patient with above medical and oncological diseases 

was admitted on 8/3/18 due to chest pain, SOB and other complaints. She was 

noted left side weakness and heaviness, so Neurology was required for 

consultation. 





PAST MEDICAL HISTORY


Cardiovascular:  CAD, HTN, Hyperlipidemia


CENTRAL NERVOUS SYSTEM:  CVA, TIA


GI:  GERD


Heme/Onc:  Cancer (breast)


Musculoskeletal:  Osteoarthritis


Rheumatologic:  Rheumatoid arthritis


Endocrine:  Diabetes





PAST SURGICAL HISTORY


CABG (details unknown ), Total knee replacement (bilateral )





FAMILY HISTORY


Family History Unknown





SOCIAL HISTORY


Smoke:  Quit


ALCOHOL:  other (daily beer )


Drugs:  None





ALLERGIES


Milk (Verified  Allergy, Intermediate, 8/3/18)


Montelukast (Verified  Allergy, Intermediate, 8/3/18)





MEDICATIONS:


Refer to MAR





REVIEW OF SYSTEMS:


Constitutional: Obesity.


Head: No traumatic brain or head injury.


Skin: No edema, or rash.


Ear: No infection.


Eyes: No vision loss or color blindness.


Nose: No bleeding or purulent discharges.


Hearing: No hearing decrease.


Neck: No injury.


Breast: Cancer


Cardiac: CAD, AFib, s/p CABG, AFib, HTN, HLD.


Pulmonary: No COPD.


GI: No GI ulcer, GI bleeding.


Urinary/genital: UTI.


Endocrinologic: DM. Obesity.


Skeletomuscular: No muscular atrophy, deformity


Neurological: see  HP.


Psychiatric: Denies drug use/abuse.


Otherwise, not pertinant14-point review of systems.





PHYSICAL EXAMINATION:   


General appearance is in subacute distress.  


HEENT:  Normocephalic and nontraumatic.  Eyes, nose, ears, and throat are 

unremarkable.  


Neck is supple. No lymphadenopathy. No bruits are heard over the carotid 

artery.  No crepitus. 


Cardiovascular:  S1, S2, irregular rate and rhythm.  


Pulmonary:  Clear to auscultation bilaterally. 


Abdomen:  Bowel sounds are positive.  Abdomen is soft, nontender, and 

nondistended.    


Extremities:  No rash, lesions, or edema.  


No restriction of range of motion





NEUROLOGICAL  EXAMINATION:


Awake.


Oriented partially to time, place and person, but reaction was slow.


PERRL.


EOMI.


CN: no focal findings.


Muscle tone: within normal.


Muscle strength: 4+


DTR: 2-


Plantar reflex: eutral response bilaterally 


Gait: not examined in chair.


Sensory exam: no abnormal findings.


No cerebellar signs elicited.


F-T-N test fine





Current Medications


Current Medications





Current Medications


Sodium Chloride 1,000 ml @  125 mls/hr 1X  ONCE IV  Last administered on 8/3/

18at 19:50;  Start 8/3/18 at 15:45;  Stop 8/3/18 at 23:44;  Status DC


Ceftriaxone Sodium 1 gm/ Dextrose 50 ml @  100 mls/hr Q24H IV ;  Start 8/3/18 

at 15:45;  Status UNV


Ceftriaxone Sodium (Rocephin) 1 gm Q24H IVP  Last administered on 8/11/18at 17:

26;  Start 8/3/18 at 17:00


Acetaminophen (Tylenol) 1,000 mg BID PO  Last administered on 8/13/18at 09:00;  

Start 8/3/18 at 21:00


Alprazolam (Xanax) 0.25 mg PRN Q6HRS  PRN PO ANXIETY / AGITATION Last 

administered on 8/12/18at 21:04;  Start 8/3/18 at 18:45


Carvedilol (Coreg) 3.125 mg BIDWMEALS PO  Last administered on 8/13/18at 08:55;

  Start 8/3/18 at 21:00


Cholestyramine Resin (Questran Light) 4 gm DAILY PO  Last administered on 8/8/ 18at 08:57;  Start 8/4/18 at 09:00;  Stop 8/10/18 at 07:46;  Status DC


Citalopram Hydrobromide (CeleXA) 10 mg DAILY PO  Last administered on 8/13/18at 

08:55;  Start 8/4/18 at 09:00


Clopidogrel Bisulfate (Plavix) 75 mg DAILY PO  Last administered on 8/8/18at 08:

57;  Start 8/4/18 at 09:00;  Stop 8/9/18 at 15:20;  Status DC


Diphenhydramine HCl (Benadryl) 50 mg QHS PO  Last administered on 8/12/18at 21:

04;  Start 8/3/18 at 21:00


Docusate Sodium (Colace) 100 mg DAILY PO  Last administered on 8/13/18at 08:56;

  Start 8/4/18 at 09:00


Insulin Glargine (Lantus) 20 units DAILYWBKFT SQ  Last administered on 8/11/ 18at 08:55;  Start 8/4/18 at 08:00;  Stop 8/12/18 at 08:32;  Status DC


Isosorbide Mononitrate (Imdur) 30 mg DAILY PO  Last administered on 8/13/18at 08

:56;  Start 8/4/18 at 09:00


Lisinopril (Prinivil) 10 mg DAILY PO  Last administered on 8/13/18at 08:55;  

Start 8/4/18 at 09:00


Multivitamins/ Minerals (I-Josias) 1 tab BID PO  Last administered on 8/13/18at 08

:56;  Start 8/3/18 at 21:00


Non-Formulary Medication (Alendronate Sodium ) 70 mg WEEKLY PO ;  Start 8/10/18 

at 09:00;  Status UNV


Bumetanide (Bumex) 2 mg DAILY PO  Last administered on 8/13/18at 08:54;  Start 8 /4/18 at 09:00


Insulin Human Lispro (HumaLOG) 5 units TIDWMEALS SQ  Last administered on 8/11/ 18at 17:35;  Start 8/4/18 at 08:00;  Stop 8/12/18 at 08:32;  Status DC


Methotrexate (Rheumatrex) 15 mg WEEKLY PO  Last administered on 8/9/18at 09:00;

  Start 8/9/18 at 09:00


Fish Oil (Fish Oil) 1,000 mg DAILY PO  Last administered on 8/13/18at 08:57;  

Start 8/4/18 at 09:00


Pantoprazole Sodium (Protonix) 40 mg DAILYAC PO  Last administered on 8/13/18at 

08:55;  Start 8/4/18 at 07:30


Simvastatin (Zocor) 40 mg QHS PO  Last administered on 8/12/18at 21:04;  Start 8

/3/18 at 21:00


Warfarin Sodium (Coumadin) 7.5 mg DAILY16 PO ;  Start 8/4/18 at 16:00;  Stop 8/5 /18 at 11:25;  Status DC


Warfarin Sodium (Coumadin Per Physician) 1 each PRN DAILY  PRN MC SEE COMMENTS;

  Start 8/3/18 at 20:00;  Stop 8/9/18 at 15:20;  Status DC


Furosemide (Lasix) 40 mg 1X  ONCE IVP  Last administered on 8/5/18at 12:05;  

Start 8/5/18 at 12:00;  Stop 8/5/18 at 12:01;  Status DC


Magnesium Sulfate/ Dextrose 100 ml @  25 mls/hr 1X  ONCE IV  Last administered 

on 8/6/18at 12:16;  Start 8/6/18 at 13:00;  Stop 8/6/18 at 16:59;  Status DC


Potassium Chloride (Klor-Con) 40 meq 1X  ONCE PO  Last administered on 8/6/18at 

12:15;  Start 8/6/18 at 12:30;  Stop 8/6/18 at 12:31;  Status DC


Polyethylene Glycol (miraLAX Powder BULK BOTTLE) 238 gm 1X  ONCE PO  Last 

administered on 8/8/18at 13:55;  Start 8/8/18 at 13:00;  Stop 8/8/18 at 13:01;  

Status DC


Magnesium Citrate (Citroma) 296 ml 1X  ONCE PO  Last administered on 8/8/18at 12

:58;  Start 8/8/18 at 12:00;  Stop 8/8/18 at 12:01;  Status DC


Bisacodyl (Dulcolax Tab) 10 mg 1X  ONCE PO  Last administered on 8/8/18at 12:58

;  Start 8/8/18 at 12:30;  Stop 8/8/18 at 12:31;  Status DC


Bisacodyl (Dulcolax Tab) 10 mg 1X  ONCE PO  Last administered on 8/8/18at 13:55

;  Start 8/8/18 at 14:00;  Stop 8/8/18 at 14:01;  Status DC


Ringer's Solution 1,000 ml @  50 mls/hr Q20H IV ;  Start 8/9/18 at 07:00;  Stop 

8/9/18 at 18:59;  Status DC


Diphenhydramine HCl (Benadryl) 25 mg 1X  ONCE IVP  Last administered on 8/9/ 18at 09:30;  Start 8/9/18 at 09:30;  Stop 8/9/18 at 09:31;  Status DC


Lactobacillus Rhamnosus (Culturelle) 1 cap BID PO ;  Start 8/9/18 at 21:00;  

Status Cancel


Midazolam HCl (Versed) 2 mg PRN 1X  PRN IV PRIOR TO PROCEDURE;  Start 8/9/18 at 

13:00;  Stop 8/10/18 at 12:59;  Status DC


Fentanyl Citrate (Fentanyl 2ml Vial) 25 mcg PRN Q5MIN  PRN IV X 2 DOSES FOR PAIN

;  Start 8/9/18 at 13:00;  Stop 8/10/18 at 12:59;  Status DC


Fentanyl Citrate (Fentanyl 2ml Vial) 50 mcg PRN Q5MIN  PRN IV X 2 DOSES FOR PAIN

;  Start 8/9/18 at 13:00;  Stop 8/10/18 at 12:59;  Status DC


Ringer's Solution 1,000 ml @  125 mls/hr Q8H IV  Last administered on 8/9/18at 

13:12;  Start 8/9/18 at 12:54;  Stop 8/9/18 at 22:35;  Status DC


Lidocaine HCl (Xylocaine-Mpf 1% Vial) 2 ml 1X PRN  PRN ID IV START;  Start 8/9/ 18 at 13:00;  Stop 8/10/18 at 12:59;  Status DC


Aspirin (Ecotrin) 81 mg DAILYWBKFT PO  Last administered on 8/13/18at 08:56;  

Start 8/10/18 at 08:00


Iron Sucrose 500 mg/Sodium Chloride 275 ml @  78.571 mls/ hr 1X  ONCE IV  Last 

administered on 8/9/18at 17:30;  Start 8/9/18 at 17:30;  Stop 8/9/18 at 20:59;  

Status DC


Iohexol (Omnipaque 300 Mg/ml) 75 ml 1X  ONCE IV  Last administered on 8/10/18at 

06:15;  Start 8/10/18 at 06:15;  Stop 8/10/18 at 06:16;  Status DC


Iohexol (Omnipaque 240 Mg/ml) 50 ml 1X  ONCE PO  Last administered on 8/10/18at 

06:15;  Start 8/10/18 at 06:15;  Stop 8/10/18 at 06:16;  Status DC


Info (CONTRAST GIVEN -- Rx MONITORING) 1 each PRN DAILY  PRN MC SEE COMMENTS;  

Start 8/10/18 at 06:15;  Stop 8/12/18 at 06:14;  Status DC


Cholestyramine Resin (Questran Light) 4 gm DAILY PO ;  Start 8/10/18 at 13:00


Insulin Glargine (Lantus) 17 units DAILYWBKFT SQ  Last administered on 8/13/ 18at 09:06;  Start 8/13/18 at 08:00


Insulin Human Lispro (HumaLOG) 4 units TIDWMEALS SQ  Last administered on 8/13/ 18at 12:24;  Start 8/12/18 at 12:00


Acetaminophen (Tylenol) 650 mg PRN Q6HRS  PRN PO TEMP > 100.4F Last 

administered on 8/13/18at 08:54;  Start 8/12/18 at 16:15


Acetaminophen (Tylenol Supp) 650 mg PRN Q4HRS  PRN MD TEMP > 100.4F;  Start 8/12 /18 at 16:15


Heparin Sodium/ Dextrose 500 ml @ 0 mls/hr CONT  PRN IV SEE I/O RECORD;  Start 8 /12/18 at 16:15;  Status UNV


Heparin Sodium/ Dextrose 500 ml @ 0 mls/hr CONT  PRN IV SEE I/O RECORD Last 

administered on 8/13/18at 12:23;  Start 8/12/18 at 16:30


Heparin Sodium (Porcine) (Heparin Sodium) 2,600 unit PRN Q6HRS  PRN IV FOR UFH 

LEVEL LESS THAN 0.2 Last administered on 8/13/18at 03:31;  Start 8/12/18 at 16:

30


Info (Anti-Coagulation Monitoring By Pharmacy) 1 each PRN DAILY  PRN MC SEE 

COMMENTS Last administered on 8/13/18at 13:07;  Start 8/12/18 at 16:45


Gadobutrol (Gadavist) 10 mmol 1X  ONCE IV ;  Start 8/13/18 at 13:45;  Stop 8/13/ 18 at 13:46;  Status DC





Active Scripts


Active


Reported


Carvedilol 3.125 Mg Tablet 3.125 Mg PO BID


Methotrexate (Methotrexate Sodium) 2.5 Mg Tablet 6 Tab PO WEEKLY


Benadryl (Diphenhydramine Hcl) 25 Mg Capsule 2 Cap PO QHS


Bumetanide 2 Mg Tablet 2 Mg PO DAILY


Ocuvite Tablet (Vit A,C & E/Lutein/Minerals) 1 Each Tablet 1 Each PO BID


Prevalite Packet (Cholestyramine/Aspartame) 4 Gm Powd.pack 4 Gm PO 


Citalopram Hbr (Citalopram Hydrobromide) 10 Mg Tablet 10 Mg PO DAILY


Clopidogrel (Clopidogrel Bisulfate) 75 Mg Tablet 75 Mg PO DAILY


Alprazolam 0.25 Mg Tablet 0.25 Mg PO PRN Q6HRS PRN


Isosorbide Mononitrate Er (Isosorbide Mononitrate) 30 Mg Tab.er.24h 30 Mg PO 

DAILY


Prilosec Otc (Omeprazole Magnesium) 20 Mg Tablet.dr 20 Mg PO DAILY


Novolog Flexpen (Insulin Aspart) 100 Unit/1 Ml Insuln.pen 5 Unit SQ TIDBFRMEAL


Lantus Solostar (Insulin Glargine,Hum.rec.anlog) 100 Unit/1 Ml Insuln.pen 20 

Unit SQ DAILYWBKFT


Acetaminophen 500 Mg Tablet 2 Tab PO BID


Vitamin D-3 (Cholecalciferol (Vitamin D3)) 2,000 Unit Capsule 1,000 Unit PO 


Alendronate Sodium 70 Mg Tablet 70 Mg PO WEEKLY


Warfarin Sodium 5 Mg Tablet 1.5 Tab PO DAILY


Lisinopril 10 Mg Tablet 10 Mg PO DAILY


Simvastatin 40 Mg Tablet 40 Mg PO DAILY


Colace (Docusate Sodium) 100 Mg Capsule 100 Mg PO 


Garlic 1,000 Mg Capsule 1,000 Mg PO 


Multivitamins (Multivitamin) 1 Each Capsule 1 Each PO 


[place] 3 Fish Oil Softgel (Omega-3 Fatty Acids/Fish Oil) 1 Each Capsule. 1 

Each PO DAILY





Allergies


Allergies:





Allergies








Coded Allergies Type Severity Reaction Last Updated Verified


 


  milk Allergy Intermediate  8/9/18 Yes


 


  montelukast Allergy Intermediate  8/9/18 Yes











ROS


Review of System


The patient denies any associated fevers, chills, headache, ear pain, rhinorrhea

, sore throat, stiff neck, productive cough, chest pain, shortness of breath, 

back or flank pain, abdominal pain, nausea, vomiting, diarrhea, constipation, 

dysuria, rash, numbness, weakness, tingling, incontinence, difficulty  

ambulating, or diaphoresis.





Physical Exam


Physical Exam


General: Well developed, well nourished, no acute distress, well appearing


HEENT:  Pupils equally round and reactive to light, EOMI, no discharge, normal 

conjunctiva


Neck:  Supple, no nuchal rigidity, no JVD, trachea midline, no tenderness


Cardiac:  RRR, no murmurs, no gallops, no rubs


Chest/Lungs:  CTAB, no wheeze, no rhonchi, no crackles


Abdomen: soft, non-distended, no guarding, no peritoneal signs, non-tender 


Back:  No tenderness


Extremities:  no edema, pulses intact, non-tender,capillary refill <3 sec 

bilateral upper and lower extremities,


Neuro:  Alert and oriented x 4, no focal deficits, normal speech





Vitals


Vitals:





Vital Signs








  Date Time  Temp Pulse Resp B/P (MAP) Pulse Ox O2 Delivery O2 Flow Rate FiO2


 


8/13/18 15:24 97.4 81 18 124/45 (71) 95 Room Air  





 97.4       


 


8/13/18 08:00       2.0 











Labs


Labs





Laboratory Tests








Test


 8/11/18


17:25 8/11/18


20:26 8/11/18


21:14 8/12/18


02:10


 


Glucose (Fingerstick)


 72 mg/dL


(70-99) 60 mg/dL


(70-99) 82 mg/dL


(70-99) 125 mg/dL


(70-99)


 


Test


 8/12/18


04:30 8/12/18


07:01 8/12/18


10:39 8/12/18


15:03


 


White Blood Count


 7.3 x10^3/uL


(4.0-11.0) 


 


 





 


Red Blood Count


 3.00 x10^6/uL


(3.50-5.40) 


 


 





 


Hemoglobin


 8.2 g/dL


(12.0-15.5) 


 


 





 


Hematocrit


 25.3 %


(36.0-47.0) 


 


 





 


Mean Corpuscular Volume 84 fL ()    


 


Mean Corpuscular Hemoglobin 27 pg (25-35)    


 


Mean Corpuscular Hemoglobin


Concent 32 g/dL


(31-37) 


 


 





 


Red Cell Distribution Width


 20.7 %


(11.5-14.5) 


 


 





 


Platelet Count


 364 x10^3/uL


(140-400) 


 


 





 


Neutrophils (%) (Auto) 71 % (31-73)    


 


Lymphocytes (%) (Auto) 20 % (24-48)    


 


Monocytes (%) (Auto) 7 % (0-9)    


 


Eosinophils (%) (Auto) 2 % (0-3)    


 


Basophils (%) (Auto) 1 % (0-3)    


 


Neutrophils # (Auto)


 5.2 x10^3uL


(1.8-7.7) 


 


 





 


Lymphocytes # (Auto)


 1.4 x10^3/uL


(1.0-4.8) 


 


 





 


Monocytes # (Auto)


 0.5 x10^3/uL


(0.0-1.1) 


 


 





 


Eosinophils # (Auto)


 0.1 x10^3/uL


(0.0-0.7) 


 


 





 


Basophils # (Auto)


 0.1 x10^3/uL


(0.0-0.2) 


 


 





 


Sodium Level


 137 mmol/L


(136-145) 


 


 





 


Potassium Level


 3.7 mmol/L


(3.5-5.1) 


 


 





 


Chloride Level


 102 mmol/L


() 


 


 





 


Carbon Dioxide Level


 29 mmol/L


(21-32) 


 


 





 


Anion Gap 6 (6-14)    


 


Blood Urea Nitrogen


 13 mg/dL


(7-20) 


 


 





 


Creatinine


 0.8 mg/dL


(0.6-1.0) 


 


 





 


Estimated GFR


(Cockcroft-Gault) 68.7 


 


 


 





 


BUN/Creatinine Ratio 16 (6-20)    


 


Glucose Level


 100 mg/dL


(70-99) 


 


 





 


Calcium Level


 8.5 mg/dL


(8.5-10.1) 


 


 





 


Total Bilirubin


 0.5 mg/dL


(0.2-1.0) 


 


 





 


Aspartate Amino Transf


(AST/SGOT) 21 U/L (15-37) 


 


 


 





 


Alanine Aminotransferase


(ALT/SGPT) 17 U/L (14-59) 


 


 


 





 


Alkaline Phosphatase


 111 U/L


() 


 


 





 


Total Protein


 6.7 g/dL


(6.4-8.2) 


 


 





 


Albumin


 2.3 g/dL


(3.4-5.0) 


 


 





 


Albumin/Globulin Ratio 0.5 (1.0-1.7)    


 


Glucose (Fingerstick)


 


 97 mg/dL


(70-99) 158 mg/dL


(70-99) 142 mg/dL


(70-99)


 


Test


 8/12/18


17:30 8/12/18


20:50 8/13/18


01:40 8/13/18


07:12


 


Glucose (Fingerstick)


 156 mg/dL


(70-99) 141 mg/dL


(70-99) 


 100 mg/dL


(70-99)


 


White Blood Count


 


 


 6.7 x10^3/uL


(4.0-11.0) 





 


Red Blood Count


 


 


 2.87 x10^6/uL


(3.50-5.40) 





 


Hemoglobin


 


 


 8.0 g/dL


(12.0-15.5) 





 


Hematocrit


 


 


 24.0 %


(36.0-47.0) 





 


Mean Corpuscular Volume   84 fL ()  


 


Mean Corpuscular Hemoglobin   28 pg (25-35)  


 


Mean Corpuscular Hemoglobin


Concent 


 


 33 g/dL


(31-37) 





 


Red Cell Distribution Width


 


 


 20.6 %


(11.5-14.5) 





 


Platelet Count


 


 


 344 x10^3/uL


(140-400) 





 


Heparin Anti-Xa Act,


Unfractionated 


 


 0.12 IU/mL


(0.30-0.70) 





 


Test


 8/13/18


09:25 8/13/18


11:26 


 





 


Heparin Anti-Xa Act,


Unfractionated 0.27 IU/mL


(0.30-0.70) 


 


 





 


Glucose (Fingerstick)


 


 127 mg/dL


(70-99) 


 











Laboratory Tests








Test


 8/12/18


17:30 8/12/18


20:50 8/13/18


01:40 8/13/18


07:12


 


Glucose (Fingerstick)


 156 mg/dL


(70-99) 141 mg/dL


(70-99) 


 100 mg/dL


(70-99)


 


White Blood Count


 


 


 6.7 x10^3/uL


(4.0-11.0) 





 


Red Blood Count


 


 


 2.87 x10^6/uL


(3.50-5.40) 





 


Hemoglobin


 


 


 8.0 g/dL


(12.0-15.5) 





 


Hematocrit


 


 


 24.0 %


(36.0-47.0) 





 


Mean Corpuscular Volume   84 fL ()  


 


Mean Corpuscular Hemoglobin   28 pg (25-35)  


 


Mean Corpuscular Hemoglobin


Concent 


 


 33 g/dL


(31-37) 





 


Red Cell Distribution Width


 


 


 20.6 %


(11.5-14.5) 





 


Platelet Count


 


 


 344 x10^3/uL


(140-400) 





 


Heparin Anti-Xa Act,


Unfractionated 


 


 0.12 IU/mL


(0.30-0.70) 





 


Test


 8/13/18


09:25 8/13/18


11:26 


 





 


Heparin Anti-Xa Act,


Unfractionated 0.27 IU/mL


(0.30-0.70) 


 


 





 


Glucose (Fingerstick)


 


 127 mg/dL


(70-99) 


 




















ADAN EVANS MD Aug 13, 2018 16:21

## 2018-08-13 NOTE — PDOC
PROGRESS NOTES


Subjective


Subjective


HPI  -f/u of Esophageal nodules





ROS - fell twice yesterday





Objective


Objective





Vital Signs








  Date Time  Temp Pulse Resp B/P (MAP) Pulse Ox O2 Delivery O2 Flow Rate FiO2


 


8/13/18 11:00 97.9 79 20 124/42 (69) 92 Room Air  





 97.9       


 


8/13/18 08:00       2.0 














Intake and Output 


 


 8/13/18





 06:59


 


Intake Total 825 ml


 


Balance 825 ml


 


 


 


Intake Oral 825 ml


 


# Voids 9











Physical Exam


Heart:  Normal S1, Normal S2


General:  Alert, Oriented X3, No acute distress


Lungs:  Clear to auscultation


Neuro:  Normal speech


Psych/Mental Status:  Mental status NL





Assessment


Assessment


IMPRESSION AND PLAN:





1.  Esophageal nodules clinically concerning for esophageal cancer noted on EGD


done on 08/09/2018.  Biopsies have been obtained and results are pending. 


CT chest, abdomen and pelvis 8/10/18 Multiple mediastinal lymph nodes 

identified and few paraesophageal 


lymph nodes could be reactive or metastatic. Plan PET CT scan for 


further evaluation. Scattered multiple lung nodules with the largest measuring 

5.5 mm in 


the right apical lung.


Bone scan 8/10/18 is neg.





Considering her age and comorbidities, treatment options would be


definitive radiation therapy versus combined chemoradiation.  I also


d/w DR Robertson, Radiation Oncology and Dr Zimmer.  I discussed in detail with 

the


patient.  She understands and agrees with the plan.





2.  Anemia.  Iron deficiency is noted with very decreased iron saturation, but


the ferritin is normal. s/p Venofer 500 mg IV x 1 dose 8/9/18.


Hb worse at 8.0 on 8/13/18, monitor cbc





3.  Coronary artery disease.  Her ejection fraction on echocardiogram done on


08/06/2018 was only 30-35%.  Appreciate Cardiology management.





4. Falls - CT head neg. MRI brain 8/13/18: Small areas of acute ischemia/

infarction are seen involving 


the right parietal lobe, the medial left frontal lobe and the left 


parietal occipital lobe. There is no significant 


surrounding edema or associated mass effect. Management per primary.





Comment


Review of Relevant


I have reviewed the following items claudio (where applicable) has been applied.


Labs





Laboratory Tests








Test


 8/11/18


17:25 8/11/18


20:26 8/11/18


21:14 8/12/18


02:10


 


Glucose (Fingerstick)


 72 mg/dL


(70-99) 60 mg/dL


(70-99) 82 mg/dL


(70-99) 125 mg/dL


(70-99)


 


Test


 8/12/18


04:30 8/12/18


07:01 8/12/18


10:39 8/12/18


15:03


 


White Blood Count


 7.3 x10^3/uL


(4.0-11.0) 


 


 





 


Red Blood Count


 3.00 x10^6/uL


(3.50-5.40) 


 


 





 


Hemoglobin


 8.2 g/dL


(12.0-15.5) 


 


 





 


Hematocrit


 25.3 %


(36.0-47.0) 


 


 





 


Mean Corpuscular Volume 84 fL ()    


 


Mean Corpuscular Hemoglobin 27 pg (25-35)    


 


Mean Corpuscular Hemoglobin


Concent 32 g/dL


(31-37) 


 


 





 


Red Cell Distribution Width


 20.7 %


(11.5-14.5) 


 


 





 


Platelet Count


 364 x10^3/uL


(140-400) 


 


 





 


Neutrophils (%) (Auto) 71 % (31-73)    


 


Lymphocytes (%) (Auto) 20 % (24-48)    


 


Monocytes (%) (Auto) 7 % (0-9)    


 


Eosinophils (%) (Auto) 2 % (0-3)    


 


Basophils (%) (Auto) 1 % (0-3)    


 


Neutrophils # (Auto)


 5.2 x10^3uL


(1.8-7.7) 


 


 





 


Lymphocytes # (Auto)


 1.4 x10^3/uL


(1.0-4.8) 


 


 





 


Monocytes # (Auto)


 0.5 x10^3/uL


(0.0-1.1) 


 


 





 


Eosinophils # (Auto)


 0.1 x10^3/uL


(0.0-0.7) 


 


 





 


Basophils # (Auto)


 0.1 x10^3/uL


(0.0-0.2) 


 


 





 


Sodium Level


 137 mmol/L


(136-145) 


 


 





 


Potassium Level


 3.7 mmol/L


(3.5-5.1) 


 


 





 


Chloride Level


 102 mmol/L


() 


 


 





 


Carbon Dioxide Level


 29 mmol/L


(21-32) 


 


 





 


Anion Gap 6 (6-14)    


 


Blood Urea Nitrogen


 13 mg/dL


(7-20) 


 


 





 


Creatinine


 0.8 mg/dL


(0.6-1.0) 


 


 





 


Estimated GFR


(Cockcroft-Gault) 68.7 


 


 


 





 


BUN/Creatinine Ratio 16 (6-20)    


 


Glucose Level


 100 mg/dL


(70-99) 


 


 





 


Calcium Level


 8.5 mg/dL


(8.5-10.1) 


 


 





 


Total Bilirubin


 0.5 mg/dL


(0.2-1.0) 


 


 





 


Aspartate Amino Transf


(AST/SGOT) 21 U/L (15-37) 


 


 


 





 


Alanine Aminotransferase


(ALT/SGPT) 17 U/L (14-59) 


 


 


 





 


Alkaline Phosphatase


 111 U/L


() 


 


 





 


Total Protein


 6.7 g/dL


(6.4-8.2) 


 


 





 


Albumin


 2.3 g/dL


(3.4-5.0) 


 


 





 


Albumin/Globulin Ratio 0.5 (1.0-1.7)    


 


Glucose (Fingerstick)


 


 97 mg/dL


(70-99) 158 mg/dL


(70-99) 142 mg/dL


(70-99)


 


Test


 8/12/18


17:30 8/12/18


20:50 8/13/18


01:40 8/13/18


07:12


 


Glucose (Fingerstick)


 156 mg/dL


(70-99) 141 mg/dL


(70-99) 


 100 mg/dL


(70-99)


 


White Blood Count


 


 


 6.7 x10^3/uL


(4.0-11.0) 





 


Red Blood Count


 


 


 2.87 x10^6/uL


(3.50-5.40) 





 


Hemoglobin


 


 


 8.0 g/dL


(12.0-15.5) 





 


Hematocrit


 


 


 24.0 %


(36.0-47.0) 





 


Mean Corpuscular Volume   84 fL ()  


 


Mean Corpuscular Hemoglobin   28 pg (25-35)  


 


Mean Corpuscular Hemoglobin


Concent 


 


 33 g/dL


(31-37) 





 


Red Cell Distribution Width


 


 


 20.6 %


(11.5-14.5) 





 


Platelet Count


 


 


 344 x10^3/uL


(140-400) 





 


Heparin Anti-Xa Act,


Unfractionated 


 


 0.12 IU/mL


(0.30-0.70) 





 


Test


 8/13/18


09:25 8/13/18


11:26 


 





 


Heparin Anti-Xa Act,


Unfractionated 0.27 IU/mL


(0.30-0.70) 


 


 





 


Glucose (Fingerstick)


 


 127 mg/dL


(70-99) 


 











Laboratory Tests








Test


 8/12/18


15:03 8/12/18


17:30 8/12/18


20:50 8/13/18


01:40


 


Glucose (Fingerstick)


 142 mg/dL


(70-99) 156 mg/dL


(70-99) 141 mg/dL


(70-99) 





 


White Blood Count


 


 


 


 6.7 x10^3/uL


(4.0-11.0)


 


Red Blood Count


 


 


 


 2.87 x10^6/uL


(3.50-5.40)


 


Hemoglobin


 


 


 


 8.0 g/dL


(12.0-15.5)


 


Hematocrit


 


 


 


 24.0 %


(36.0-47.0)


 


Mean Corpuscular Volume    84 fL () 


 


Mean Corpuscular Hemoglobin    28 pg (25-35) 


 


Mean Corpuscular Hemoglobin


Concent 


 


 


 33 g/dL


(31-37)


 


Red Cell Distribution Width


 


 


 


 20.6 %


(11.5-14.5)


 


Platelet Count


 


 


 


 344 x10^3/uL


(140-400)


 


Heparin Anti-Xa Act,


Unfractionated 


 


 


 0.12 IU/mL


(0.30-0.70)


 


Test


 8/13/18


07:12 8/13/18


09:25 8/13/18


11:26 





 


Glucose (Fingerstick)


 100 mg/dL


(70-99) 


 127 mg/dL


(70-99) 





 


Heparin Anti-Xa Act,


Unfractionated 


 0.27 IU/mL


(0.30-0.70) 


 











Microbiology


8/6/18 Blood Culture - Final, Complete


         NO GROWTH AFTER 5 DAYS


Medications





Current Medications


Sodium Chloride 1,000 ml @  125 mls/hr 1X  ONCE IV  Last administered on 8/3/

18at 19:50;  Start 8/3/18 at 15:45;  Stop 8/3/18 at 23:44;  Status DC


Ceftriaxone Sodium 1 gm/ Dextrose 50 ml @  100 mls/hr Q24H IV ;  Start 8/3/18 

at 15:45;  Status UNV


Ceftriaxone Sodium (Rocephin) 1 gm Q24H IVP  Last administered on 8/11/18at 17:

26;  Start 8/3/18 at 17:00


Acetaminophen (Tylenol) 1,000 mg BID PO  Last administered on 8/13/18at 09:00;  

Start 8/3/18 at 21:00


Alprazolam (Xanax) 0.25 mg PRN Q6HRS  PRN PO ANXIETY / AGITATION Last 

administered on 8/12/18at 21:04;  Start 8/3/18 at 18:45


Carvedilol (Coreg) 3.125 mg BIDWMEALS PO  Last administered on 8/13/18at 08:55;

  Start 8/3/18 at 21:00


Cholestyramine Resin (Questran Light) 4 gm DAILY PO  Last administered on 8/8/ 18at 08:57;  Start 8/4/18 at 09:00;  Stop 8/10/18 at 07:46;  Status DC


Citalopram Hydrobromide (CeleXA) 10 mg DAILY PO  Last administered on 8/13/18at 

08:55;  Start 8/4/18 at 09:00


Clopidogrel Bisulfate (Plavix) 75 mg DAILY PO  Last administered on 8/8/18at 08:

57;  Start 8/4/18 at 09:00;  Stop 8/9/18 at 15:20;  Status DC


Diphenhydramine HCl (Benadryl) 50 mg QHS PO  Last administered on 8/12/18at 21:

04;  Start 8/3/18 at 21:00


Docusate Sodium (Colace) 100 mg DAILY PO  Last administered on 8/13/18at 08:56;

  Start 8/4/18 at 09:00


Insulin Glargine (Lantus) 20 units DAILYWBKFT SQ  Last administered on 8/11/ 18at 08:55;  Start 8/4/18 at 08:00;  Stop 8/12/18 at 08:32;  Status DC


Isosorbide Mononitrate (Imdur) 30 mg DAILY PO  Last administered on 8/13/18at 08

:56;  Start 8/4/18 at 09:00


Lisinopril (Prinivil) 10 mg DAILY PO  Last administered on 8/13/18at 08:55;  

Start 8/4/18 at 09:00


Multivitamins/ Minerals (I-Josias) 1 tab BID PO  Last administered on 8/13/18at 08

:56;  Start 8/3/18 at 21:00


Non-Formulary Medication (Alendronate Sodium ) 70 mg WEEKLY PO ;  Start 8/10/18 

at 09:00;  Status UNV


Bumetanide (Bumex) 2 mg DAILY PO  Last administered on 8/13/18at 08:54;  Start 8 /4/18 at 09:00


Insulin Human Lispro (HumaLOG) 5 units TIDWMEALS SQ  Last administered on 8/11/ 18at 17:35;  Start 8/4/18 at 08:00;  Stop 8/12/18 at 08:32;  Status DC


Methotrexate (Rheumatrex) 15 mg WEEKLY PO  Last administered on 8/9/18at 09:00;

  Start 8/9/18 at 09:00


Fish Oil (Fish Oil) 1,000 mg DAILY PO  Last administered on 8/13/18at 08:57;  

Start 8/4/18 at 09:00


Pantoprazole Sodium (Protonix) 40 mg DAILYAC PO  Last administered on 8/13/18at 

08:55;  Start 8/4/18 at 07:30


Simvastatin (Zocor) 40 mg QHS PO  Last administered on 8/12/18at 21:04;  Start 8

/3/18 at 21:00


Warfarin Sodium (Coumadin) 7.5 mg DAILY16 PO ;  Start 8/4/18 at 16:00;  Stop 8/5 /18 at 11:25;  Status DC


Warfarin Sodium (Coumadin Per Physician) 1 each PRN DAILY  PRN MC SEE COMMENTS;

  Start 8/3/18 at 20:00;  Stop 8/9/18 at 15:20;  Status DC


Furosemide (Lasix) 40 mg 1X  ONCE IVP  Last administered on 8/5/18at 12:05;  

Start 8/5/18 at 12:00;  Stop 8/5/18 at 12:01;  Status DC


Magnesium Sulfate/ Dextrose 100 ml @  25 mls/hr 1X  ONCE IV  Last administered 

on 8/6/18at 12:16;  Start 8/6/18 at 13:00;  Stop 8/6/18 at 16:59;  Status DC


Potassium Chloride (Klor-Con) 40 meq 1X  ONCE PO  Last administered on 8/6/18at 

12:15;  Start 8/6/18 at 12:30;  Stop 8/6/18 at 12:31;  Status DC


Polyethylene Glycol (miraLAX Powder BULK BOTTLE) 238 gm 1X  ONCE PO  Last 

administered on 8/8/18at 13:55;  Start 8/8/18 at 13:00;  Stop 8/8/18 at 13:01;  

Status DC


Magnesium Citrate (Citroma) 296 ml 1X  ONCE PO  Last administered on 8/8/18at 12

:58;  Start 8/8/18 at 12:00;  Stop 8/8/18 at 12:01;  Status DC


Bisacodyl (Dulcolax Tab) 10 mg 1X  ONCE PO  Last administered on 8/8/18at 12:58

;  Start 8/8/18 at 12:30;  Stop 8/8/18 at 12:31;  Status DC


Bisacodyl (Dulcolax Tab) 10 mg 1X  ONCE PO  Last administered on 8/8/18at 13:55

;  Start 8/8/18 at 14:00;  Stop 8/8/18 at 14:01;  Status DC


Ringer's Solution 1,000 ml @  50 mls/hr Q20H IV ;  Start 8/9/18 at 07:00;  Stop 

8/9/18 at 18:59;  Status DC


Diphenhydramine HCl (Benadryl) 25 mg 1X  ONCE IVP  Last administered on 8/9/ 18at 09:30;  Start 8/9/18 at 09:30;  Stop 8/9/18 at 09:31;  Status DC


Lactobacillus Rhamnosus (Culturelle) 1 cap BID PO ;  Start 8/9/18 at 21:00;  

Status Cancel


Midazolam HCl (Versed) 2 mg PRN 1X  PRN IV PRIOR TO PROCEDURE;  Start 8/9/18 at 

13:00;  Stop 8/10/18 at 12:59;  Status DC


Fentanyl Citrate (Fentanyl 2ml Vial) 25 mcg PRN Q5MIN  PRN IV X 2 DOSES FOR PAIN

;  Start 8/9/18 at 13:00;  Stop 8/10/18 at 12:59;  Status DC


Fentanyl Citrate (Fentanyl 2ml Vial) 50 mcg PRN Q5MIN  PRN IV X 2 DOSES FOR PAIN

;  Start 8/9/18 at 13:00;  Stop 8/10/18 at 12:59;  Status DC


Ringer's Solution 1,000 ml @  125 mls/hr Q8H IV  Last administered on 8/9/18at 

13:12;  Start 8/9/18 at 12:54;  Stop 8/9/18 at 22:35;  Status DC


Lidocaine HCl (Xylocaine-Mpf 1% Vial) 2 ml 1X PRN  PRN ID IV START;  Start 8/9/ 18 at 13:00;  Stop 8/10/18 at 12:59;  Status DC


Aspirin (Ecotrin) 81 mg DAILYWBKFT PO  Last administered on 8/13/18at 08:56;  

Start 8/10/18 at 08:00


Iron Sucrose 500 mg/Sodium Chloride 275 ml @  78.571 mls/ hr 1X  ONCE IV  Last 

administered on 8/9/18at 17:30;  Start 8/9/18 at 17:30;  Stop 8/9/18 at 20:59;  

Status DC


Iohexol (Omnipaque 300 Mg/ml) 75 ml 1X  ONCE IV  Last administered on 8/10/18at 

06:15;  Start 8/10/18 at 06:15;  Stop 8/10/18 at 06:16;  Status DC


Iohexol (Omnipaque 240 Mg/ml) 50 ml 1X  ONCE PO  Last administered on 8/10/18at 

06:15;  Start 8/10/18 at 06:15;  Stop 8/10/18 at 06:16;  Status DC


Info (CONTRAST GIVEN -- Rx MONITORING) 1 each PRN DAILY  PRN MC SEE COMMENTS;  

Start 8/10/18 at 06:15;  Stop 8/12/18 at 06:14;  Status DC


Cholestyramine Resin (Questran Light) 4 gm DAILY PO ;  Start 8/10/18 at 13:00


Insulin Glargine (Lantus) 17 units DAILYWBKFT SQ  Last administered on 8/13/ 18at 09:06;  Start 8/13/18 at 08:00


Insulin Human Lispro (HumaLOG) 4 units TIDWMEALS SQ  Last administered on 8/13/ 18at 12:24;  Start 8/12/18 at 12:00


Acetaminophen (Tylenol) 650 mg PRN Q6HRS  PRN PO TEMP > 100.4F Last 

administered on 8/13/18at 08:54;  Start 8/12/18 at 16:15


Acetaminophen (Tylenol Supp) 650 mg PRN Q4HRS  PRN MT TEMP > 100.4F;  Start 8/12 /18 at 16:15


Heparin Sodium/ Dextrose 500 ml @ 0 mls/hr CONT  PRN IV SEE I/O RECORD;  Start 8 /12/18 at 16:15;  Status UNV


Heparin Sodium/ Dextrose 500 ml @ 0 mls/hr CONT  PRN IV SEE I/O RECORD Last 

administered on 8/13/18at 12:23;  Start 8/12/18 at 16:30


Heparin Sodium (Porcine) (Heparin Sodium) 2,600 unit PRN Q6HRS  PRN IV FOR UFH 

LEVEL LESS THAN 0.2 Last administered on 8/13/18at 03:31;  Start 8/12/18 at 16:

30


Info (Anti-Coagulation Monitoring By Pharmacy) 1 each PRN DAILY  PRN MC SEE 

COMMENTS;  Start 8/12/18 at 16:45





Active Scripts


Active


Reported


Carvedilol 3.125 Mg Tablet 3.125 Mg PO BID


Methotrexate (Methotrexate Sodium) 2.5 Mg Tablet 6 Tab PO WEEKLY


Benadryl (Diphenhydramine Hcl) 25 Mg Capsule 2 Cap PO QHS


Bumetanide 2 Mg Tablet 2 Mg PO DAILY


Ocuvite Tablet (Vit A,C & E/Lutein/Minerals) 1 Each Tablet 1 Each PO BID


Prevalite Packet (Cholestyramine/Aspartame) 4 Gm Powd.pack 4 Gm PO 


Citalopram Hbr (Citalopram Hydrobromide) 10 Mg Tablet 10 Mg PO DAILY


Clopidogrel (Clopidogrel Bisulfate) 75 Mg Tablet 75 Mg PO DAILY


Alprazolam 0.25 Mg Tablet 0.25 Mg PO PRN Q6HRS PRN


Isosorbide Mononitrate Er (Isosorbide Mononitrate) 30 Mg Tab.er.24h 30 Mg PO 

DAILY


Prilosec Otc (Omeprazole Magnesium) 20 Mg Tablet.dr 20 Mg PO DAILY


Novolog Flexpen (Insulin Aspart) 100 Unit/1 Ml Insuln.pen 5 Unit SQ TIDBFRMEAL


Lantus Solostar (Insulin Glargine,Hum.rec.anlog) 100 Unit/1 Ml Insuln.pen 20 

Unit SQ DAILYWBKFT


Acetaminophen 500 Mg Tablet 2 Tab PO BID


Vitamin D-3 (Cholecalciferol (Vitamin D3)) 2,000 Unit Capsule 1,000 Unit PO 


Alendronate Sodium 70 Mg Tablet 70 Mg PO WEEKLY


Warfarin Sodium 5 Mg Tablet 1.5 Tab PO DAILY


Lisinopril 10 Mg Tablet 10 Mg PO DAILY


Simvastatin 40 Mg Tablet 40 Mg PO DAILY


Colace (Docusate Sodium) 100 Mg Capsule 100 Mg PO 


Garlic 1,000 Mg Capsule 1,000 Mg PO 


Multivitamins (Multivitamin) 1 Each Capsule 1 Each PO 


[place] 3 Fish Oil Softgel (Omega-3 Fatty Acids/Fish Oil) 1 Each Capsule.dr 1 

Each PO DAILY


Vitals/I & O





Vital Sign - Last 24 Hours








 8/12/18 8/12/18 8/12/18 8/12/18





 15:00 17:30 19:31 20:05


 


Temp 97.5  98.2 





 97.5  98.2 


 


Pulse 80 82 86 


 


Resp 16  20 


 


B/P (MAP) 154/41 (78) 137/47 144/48 (80) 





 154/41 (78)   


 


Pulse Ox 96  93 


 


O2 Delivery Room Air  Room Air Room Air


 


    





    





 8/12/18 8/13/18 8/13/18 8/13/18





 23:40 03:24 07:37 08:00


 


Temp  97.6 98.1 





  97.6 98.1 


 


Pulse 89 84 77 


 


Resp 16 16 18 


 


B/P (MAP) 119/44 (69) 129/40 (69) 134/50 (78) 


 


Pulse Ox 92 93 100 


 


O2 Delivery Room Air Room Air Room Air Room Air


 


O2 Flow Rate    2.0


 


    





    





 8/13/18 8/13/18 8/13/18 8/13/18





 08:55 08:55 08:56 11:00


 


Temp    97.9





    97.9


 


Pulse 77 77 77 79


 


Resp    20


 


B/P (MAP) 134/50 134/50 134/50 124/42 (69)


 


Pulse Ox    92


 


O2 Delivery    Room Air














Intake and Output   


 


 8/12/18 8/12/18 8/13/18





 14:59 22:59 06:59


 


Intake Total  250 ml 575 ml


 


Balance  250 ml 575 ml

















HERBERTH STEWART MD Aug 13, 2018 12:37

## 2018-08-13 NOTE — PATHOLOGY
[facility] Accession Number: [id number]

.                                                                01

Material submitted:                                        .

DISTAL ESOPHAGUS BX AT 30 CM

.                                                                01

Clinical history:                                          .

Anemia

.                                                                02

**********************************************************************

Diagnosis:

Esophageal biopsies, distal esophagus at 30 cm:

- Adenocarcinoma, poorly differentiated, arising within a background of

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

(JPM:jony; 08/13/2018)

QMS/08/13/2018

**********************************************************************

.                                                                02

Comment:

Sections of the distal esophageal biopsy at 30 cm reveal a malignant

epithelial neoplasm.  The malignant cells are generally present in solid

nests which focally show abortive gland formation.  The malignant cells

possess enlarged, rounded to ovoid nuclei containing prominent nucleoli.

There is focal ulceration and necrosis.  There are also several segments

of Perla's change showing low to high-grade dysplasia.  The case is

also seen by Dr. Jarrod Verdin, who concurs with the diagnosis.

(JPM:jony; 08/13/2018)

.                                                                02

Electronically signed:                                     .

Meliton Peralta MD, Pathologist

NPI- [id number]

.                                                                01

Gross description:                                         .

The specimen is received in formalin, labeled "Jeniffer Sanchez, distal

esophagus biopsy at 30 cm".  Received are nine segments of pale tan soft

tissue ranging in size from 0.2 to 0.5 cm in maximum dimensions.  The

specimen is submitted entirely in cassette A1.

(CAA; 8/10/2018)

QAC/QAC

.                                                                02

Pathologist provided ICD-10:

C15.5, K22.70

.                                                                02

CPT                                                        .

733803

***Performed at:  01

   [place] [id number] [facility] [address], [place], [place]  [id number]

   MD Yovanny Mendez MD Phone:  [phone]

***Performed at:  02

   [facility]

   [address] [place], [place]  [id number]

   MD Meliton Peralta MD Phone:  [phone]

## 2018-08-13 NOTE — PDOC
Objective:


Objective:


D/w RN - fell twice yesterday, left-sided weakness, ?TIA


Reviewed Dr. Robertson's note.


Vital Signs:





 Vital Signs








  Date Time  Temp Pulse Resp B/P (MAP) Pulse Ox O2 Delivery O2 Flow Rate FiO2


 


8/13/18 08:56  77  134/50    


 


8/13/18 07:37 98.1  18  100 Room Air  





 98.1       


 


8/12/18 08:00       2.0 








Labs:





Laboratory Tests








Test


 8/12/18


15:03 8/12/18


17:30 8/12/18


20:50 8/13/18


07:12


 


Glucose (Fingerstick)


 142 mg/dL


(70-99) 156 mg/dL


(70-99) 141 mg/dL


(70-99) 100 mg/dL


(70-99)








Imaging:


Chest/A/P CT


IMPRESSION:


1. Multiple mediastinal lymph nodes identified and few paraesophageal lymph 

nodes could be reactive or metastatic. Consider PET CT scan for further 

evaluation.


2. Scattered multiple lung nodules with the largest measuring 5.5 mm in the 

right apical lung.


3. Mild thickening of the distal esophagus. 


4. Coronary artery calcifications.


5. Hepatic steatosis.


6. Mild lung congestive changes.


 


Bone Scan


IMPRESSION: No bone scan findings to suggest osseous metastatic disease.





CT Head


IMPRESSION:


1. Chronic findings as described above.


2. No acute intracranial abnormality is detected.





Carotid Doppler


Impression: 


No hemodynamically significant internal carotid artery stenosis.





Brain MRI


PENDING





PE:


Out of room, no exam.





A/P:


Possible esophageal malignancy - h/o Perla's esophagus


MIHAELA - stable


A Fib, TIA





--


EGD path and brain MRI pending.











JOSE FOURNIER Aug 13, 2018 11:17

## 2018-08-13 NOTE — RAD
MRI of the brain without contrast 8/13/2018

 

Clinical History: TIA. Left-sided weakness and slurred speech.

 

Technique: Unenhanced T1-weighted sagittal and axial, T2-weighted axial 

and coronal and FLAIR, gradient echo and diffusion-weighted axial images 

of the brain were obtained.

 

Findings: Comparison is made to the patient's CT scan of the head dated 

8/12/2018.

 

There is generalized parenchymal atrophy. Patchy, confluent and multiple 

small focal areas of increased signal intensity are seen within the 

periventricular and subcortical white matter of both cerebral hemispheres 

along with the ora on the FLAIR and T2-weighted images consistent with 

areas of small vessel ischemic disease. 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 

are seen scattered throughout the cerebellar hemispheres. These measure 5 

mm to 1.6 cm in size.

 

Small rounded/oval-shaped areas of restricted diffusion are seen involving

the periventricular white matter of the medial right parietal lobe 

(measuring 1 cm in greatest diameter), the deep white matter of the left 

parietal occipital lobe (measuring 5 mm in greatest diameter) and near the

genu on the corpus callosum within the medial right frontal lobe 

(measuring 7 mm in greatest diameter). They are consistent with areas of 

acute ischemia/infarction. There is no significant surrounding edema or 

associated mass effect.

 

No additional acute parenchymal abnormality is seen. No extra-axial fluid 

collection is noted.

 

Mild mucosal thickening is seen scattered throughout the paranasal 

sinuses. Normal flow voids are seen within the major vascular structures 

surrounding the brain parenchyma.

 

IMPRESSION: Small areas of acute ischemia/infarction are seen involving 

the right parietal lobe, the medial left frontal lobe and the left 

parietal occipital lobe as outlined above. There is no significant 

surrounding edema or associated mass effect.

 

The patient's nurse was notified of these findings.

 

Electronically signed by: Aleksandr Griffith MD (8/13/2018 11:34 AM) [id number]

## 2018-08-13 NOTE — RAD
Bilateral Duplex Carotid Ultrasound:

 

History: TIA/CVA.  Left-sided weakness.  Altered speech.

 

Technique: Grayscale, color Doppler, and spectral Doppler imaging was 

performed of the arteries of the neck.

 

Findings:

 

Peak systolic velocity in right common carotid artery is 80 cm/sec. Peak 

systolic velocity in the right internal carotid artery is 104 cm/sec. 

Maximum end-diastolic velocity in the right internal carotid artery is 31 

cm/sec. Right ICA/CCA ratio is 1.3. Peak systolic velocity in the right 

external carotid artery is 153 cm/sec.

 

Peak systolic velocity in left common carotid artery is 123 cm/sec. Peak 

systolic velocity in the left internal carotid artery is 104 cm/sec. 

Maximum end-diastolic velocity in the left internal carotid artery is 28 

cm/sec. Left ICA/CCA ratio is 0.8. Peak systolic velocity in the left 

external carotid artery is 133 cm/sec.

 

Both vertebral arteries demonstrate antegrade flow.

 

Grayscale imaging demonstrates calcified plaquing at the right carotid 

bulb extending into the internal and external carotid arteries.  A lesser 

amount of plaque is seen involving the left carotid bulb.

 

Impression: 

No hemodynamically significant internal carotid artery stenosis.

 

Note:

 

Stenosis calculations for carotid ultrasound studies are derived from 

validated velocity criteria which are known to correlate with the NASCET 

methodology.

 

Electronically signed by: Bruce Downey MD (8/13/2018 9:54 AM) [place] [id number]

## 2018-08-13 NOTE — PN
DATE:  08/13/2018



She is in room 648.



SUBJECTIVE:  The patient is awake and alert.  Has fell yesterday with left-sided

weakness and some facial weakness as well as some dysphagia, was treated with

the stroke protocol, which included a CT head, which shows chronic changes, but

nothing acute and was started on heparin as her Coumadin has been on hold for

her workup including the esophageal biopsies last week.



OBJECTIVE:

VITAL SIGNS:  Stable.  She is afebrile.

GENERAL:  She is awake and alert.

CHEST:  Clear.

HEART:  Regular.

ABDOMEN:  Benign.  Esophageal biopsies are still pending.



IMPRESSION:

1.  CT scanning shows concern for  metastatic disease with lymphadenopathy and

some pulmonary lesions.

2.  Atrial fibrillation.

3.  Possible transient ischemic attack as I do not see any evidence of a

definite stroke this morning.

4.  Cardiomyopathy.

5.  Diabetes.

6.  Coagulopathy, resolved, withholding Coumadin.

7.  Congestive heart failure, stable.



PLAN:  Await esophageal biopsies.  Continue heparin currently per Neuro and will

likely need to be started back on her Coumadin.  Therapy is ongoing and working

with her.

 



______________________________

MARSHALL RICHARDS MD



DR:  VENKATA/tu  JOB#:  [id number] / [id number]

DD:  08/13/2018 08:42  DT:  08/13/2018 12:40

## 2018-08-14 VITALS — DIASTOLIC BLOOD PRESSURE: 48 MMHG | SYSTOLIC BLOOD PRESSURE: 128 MMHG

## 2018-08-14 VITALS — DIASTOLIC BLOOD PRESSURE: 42 MMHG | SYSTOLIC BLOOD PRESSURE: 131 MMHG

## 2018-08-14 VITALS — DIASTOLIC BLOOD PRESSURE: 43 MMHG | SYSTOLIC BLOOD PRESSURE: 120 MMHG

## 2018-08-14 VITALS — SYSTOLIC BLOOD PRESSURE: 130 MMHG | DIASTOLIC BLOOD PRESSURE: 47 MMHG

## 2018-08-14 VITALS — DIASTOLIC BLOOD PRESSURE: 50 MMHG | SYSTOLIC BLOOD PRESSURE: 136 MMHG

## 2018-08-14 VITALS — SYSTOLIC BLOOD PRESSURE: 125 MMHG | DIASTOLIC BLOOD PRESSURE: 41 MMHG

## 2018-08-14 LAB
ANION GAP SERPL CALC-SCNC: 7 MMOL/L (ref 6–14)
BUN SERPL-MCNC: 13 MG/DL (ref 7–20)
CALCIUM SERPL-MCNC: 8.8 MG/DL (ref 8.5–10.1)
CHLORIDE SERPL-SCNC: 101 MMOL/L (ref 98–107)
CO2 SERPL-SCNC: 31 MMOL/L (ref 21–32)
CREAT SERPL-MCNC: 0.9 MG/DL (ref 0.6–1)
ERYTHROCYTE [DISTWIDTH] IN BLOOD BY AUTOMATED COUNT: 21.1 % (ref 11.5–14.5)
GFR SERPLBLD BASED ON 1.73 SQ M-ARVRAT: 59.9 ML/MIN
GLUCOSE SERPL-MCNC: 152 MG/DL (ref 70–99)
HCT VFR BLD CALC: 28.2 % (ref 36–47)
HGB BLD-MCNC: 9 G/DL (ref 12–15.5)
MAGNESIUM SERPL-MCNC: 1.5 MG/DL (ref 1.8–2.4)
MCH RBC QN AUTO: 28 PG (ref 25–35)
MCHC RBC AUTO-ENTMCNC: 32 G/DL (ref 31–37)
MCV RBC AUTO: 86 FL (ref 79–100)
PLATELET # BLD AUTO: 371 X10^3/UL (ref 140–400)
POTASSIUM SERPL-SCNC: 4 MMOL/L (ref 3.5–5.1)
RBC # BLD AUTO: 3.27 X10^6/UL (ref 3.5–5.4)
SODIUM SERPL-SCNC: 139 MMOL/L (ref 136–145)
WBC # BLD AUTO: 6.3 X10^3/UL (ref 4–11)

## 2018-08-14 RX ADMIN — Medication PRN EACH: at 12:46

## 2018-08-14 RX ADMIN — SIMVASTATIN SCH MG: 40 TABLET, FILM COATED ORAL at 21:33

## 2018-08-14 RX ADMIN — ACETAMINOPHEN SCH MG: 500 TABLET ORAL at 21:33

## 2018-08-14 RX ADMIN — DOCUSATE SODIUM SCH MG: 100 CAPSULE, LIQUID FILLED ORAL at 10:21

## 2018-08-14 RX ADMIN — INSULIN LISPRO SCH UNITS: 100 INJECTION, SOLUTION INTRAVENOUS; SUBCUTANEOUS at 18:16

## 2018-08-14 RX ADMIN — ACETAMINOPHEN SCH MG: 500 TABLET ORAL at 10:21

## 2018-08-14 RX ADMIN — ISOSORBIDE MONONITRATE SCH MG: 30 TABLET, EXTENDED RELEASE ORAL at 10:22

## 2018-08-14 RX ADMIN — CARVEDILOL SCH MG: 3.12 TABLET, FILM COATED ORAL at 10:23

## 2018-08-14 RX ADMIN — CITALOPRAM HYDROBROMIDE SCH MG: 10 TABLET ORAL at 10:22

## 2018-08-14 RX ADMIN — ASPIRIN SCH MG: 81 TABLET, COATED ORAL at 10:22

## 2018-08-14 RX ADMIN — BUMETANIDE SCH MG: 1 TABLET ORAL at 10:21

## 2018-08-14 RX ADMIN — HEPARIN SODIUM AND DEXTROSE PRN MLS/HR: 5000; 5 INJECTION INTRAVENOUS at 02:34

## 2018-08-14 RX ADMIN — INSULIN LISPRO SCH UNITS: 100 INJECTION, SOLUTION INTRAVENOUS; SUBCUTANEOUS at 13:12

## 2018-08-14 RX ADMIN — I-VITE, TAB 1000-60-2MG (60/BT) SCH TAB: TAB at 10:21

## 2018-08-14 RX ADMIN — PANTOPRAZOLE SODIUM SCH MG: 40 TABLET, DELAYED RELEASE ORAL at 10:21

## 2018-08-14 RX ADMIN — HEPARIN SODIUM AND DEXTROSE PRN MLS/HR: 5000; 5 INJECTION INTRAVENOUS at 18:26

## 2018-08-14 RX ADMIN — CARVEDILOL SCH MG: 3.12 TABLET, FILM COATED ORAL at 18:02

## 2018-08-14 RX ADMIN — CHOLESTYRAMINE SCH GM: 4 POWDER, FOR SUSPENSION ORAL at 09:00

## 2018-08-14 RX ADMIN — INSULIN LISPRO SCH UNITS: 100 INJECTION, SOLUTION INTRAVENOUS; SUBCUTANEOUS at 10:29

## 2018-08-14 RX ADMIN — Medication SCH MG: at 10:21

## 2018-08-14 RX ADMIN — LISINOPRIL SCH MG: 10 TABLET ORAL at 10:22

## 2018-08-14 RX ADMIN — INSULIN GLARGINE SCH UNITS: 100 INJECTION, SOLUTION SUBCUTANEOUS at 10:30

## 2018-08-14 RX ADMIN — CEFTRIAXONE SCH GM: 1 INJECTION, POWDER, FOR SOLUTION INTRAMUSCULAR; INTRAVENOUS at 18:01

## 2018-08-14 RX ADMIN — I-VITE, TAB 1000-60-2MG (60/BT) SCH TAB: TAB at 21:33

## 2018-08-14 RX ADMIN — NYSTATIN SCH APP: 100000 POWDER TOPICAL at 21:34

## 2018-08-14 NOTE — PN
DATE:  08/14/2018



LOCATION:  She is in room 648.



SUBJECTIVE:  The patient is awake and alert, feels like her breathing has

improved.  She is very concerned and obviously upset about the diagnosis of

carcinoma of the esophagus.



OBJECTIVE:

VITAL SIGNS:  Stable.  She is afebrile.

GENERAL:  She is awake and alert.  Sugars are good carotid Dopplers are

negative.  MRI of the head yesterday showed several small infarcts predominantly

right parietal, left frontal and left occipital area suggesting a shower of

emboli from her atrial fib for which she has been off the Coumadin due to

coagulopathy and need for the EGD, colonoscopy and biopsies.  She is currently

on heparin for the same.

NEUROLOGIC:  Her speech may be just minimally effective, but I see no other

focal neurological signs.



ASSESSMENT:

1.  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

suspicious for metastatic disease.

2.  Atrial fibrillation.

3.  Multifocal cerebrovascular accidents due to shower of emboli from atrial

fibrillation.

4.  Cardiomyopathy.

5.  Diabetes.

6.  Coagulopathy, resolved.

7.  Congestive heart failure, stable.



PLAN:  At this point, Oncology and Radiation recommend PET scanning as an

outpatient when she is neurologically stable.  At this point, Coumadin either

needs to be restarted while she is on the heparin or we need to consider one of

the newer anticoagulants.  I am going to discuss the same with Neurology to try

to figure out what the best plan regarding this, although one of the newer

agents would allow her to be discharged sooner and begin further workup and

treatment of adenocarcinoma of the esophagus.

 



______________________________

MARSHALL RICHARDS MD DR:  VENKATA/tu  JOB#:  [id number] / [id number]

DD:  08/14/2018 08:46  DT:  08/14/2018 23:42

## 2018-08-14 NOTE — PDOC
Subjective:


Subjective:


Felt dizzy, had an accident this morning - little better now.





Objective:


Vital Signs:





 Vital Signs








  Date Time  Temp Pulse Resp B/P (MAP) Pulse Ox O2 Delivery O2 Flow Rate FiO2


 


8/14/18 10:23  80  128/48    


 


8/14/18 08:00      Room Air  


 


8/14/18 07:35 97.5  18  96   





 97.5       


 


8/13/18 08:00       2.0 








Labs:





Laboratory Tests





Laboratory Tests








Test


 8/13/18


17:02 8/13/18


18:45 8/13/18


21:12 8/14/18


00:50


 


Glucose (Fingerstick) 92 mg/dL   162 mg/dL  


 


Heparin Anti-Xa Act,


Unfractionated 


 0.38 IU/mL 


 


 0.60 IU/mL 





 


Test


 8/14/18


07:16 8/14/18


09:40 


 





 


Glucose (Fingerstick) 114 mg/dL    


 


Heparin Anti-Xa Act,


Unfractionated 


 0.40 IU/mL 


 


 











Diagnosis:


Esophageal biopsies, distal esophagus at 30 cm:


- Adenocarcinoma, poorly differentiated, arising within a background of Perla'

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


Comment:


Sections of the distal esophageal biopsy at 30 cm reveal a malignant epithelial 

neoplasm.  The malignant cells are generally present in solid nests which 

focally show abortive gland formation.  The malignant cells possess enlarged, 

rounded to ovoid nuclei containing prominent nucleoli. There is focal 

ulceration and necrosis.  There are also several segments of Perla's change 

showing low to high-grade dysplasia.


Imaging:


Brain MRI 8/13


IMPRESSION: Small areas of acute ischemia/infarction are seen involving the 

right parietal lobe, the medial left frontal lobe and the left parietal 

occipital lobe as outlined above. There is no significant surrounding edema or 

associated mass effect.


 


The patient's nurse was notified of these findings.





Brain MRI 8/14


PENDING





PE:





GEN: NAD, up to chair


LUNGS: CTAB


HEART: RRR


ABD: NABS, S/ND/NT


NEURO/PSYCH: A & O 3





A/P:


Esophageal adenocarcinoma


MIHAELA


A Fib


Right parietal lobe, left frontal and parieto-occipital lobe infarcts





--


Continue same per GI - plans per Dr. Champion and Dr. Robertson.











JOSE FOURNIER Aug 14, 2018 11:40

## 2018-08-14 NOTE — PDOC
LEWIS GUTIERREZ APRN 8/14/18 1024:


CARDIO Progress Notes


Date and Time


Date of Service


8/14/2018


Time of Evaluation


1015





Subjective


Subjective:  No Chest Pain, No shortness of breath, No Palpitations, Other (

sitting up, no complaints)





Vitals


Vitals





Vital Signs








  Date Time  Temp Pulse Resp B/P (MAP) Pulse Ox O2 Delivery O2 Flow Rate FiO2


 


8/14/18 07:35 97.5 80 18 128/48 (74) 96 Room Air  





 97.5       


 


8/13/18 08:00       2.0 








Weight


Weight [ ]





Input and Output


Intake and Output











Intake and Output 


 


 8/14/18





 07:00


 


Intake Total 1050 ml


 


Balance 1050 ml


 


 


 


Intake Oral 1050 ml


 


# Voids 7











Laboratory


Labs





Laboratory Tests








Test


 8/13/18


11:26 8/13/18


17:02 8/13/18


18:45 8/13/18


21:12


 


Glucose (Fingerstick)


 127 mg/dL


(70-99) 92 mg/dL


(70-99) 


 162 mg/dL


(70-99)


 


Heparin Anti-Xa Act,


Unfractionated 


 


 0.38 IU/mL


(0.30-0.70) 





 


Test


 8/14/18


00:50 8/14/18


07:16 


 





 


Heparin Anti-Xa Act,


Unfractionated 0.60 IU/mL


(0.30-0.70) 


 


 





 


Glucose (Fingerstick)


 


 114 mg/dL


(70-99) 


 














Microbiology


Micro





Microbiology


8/6/18 Blood Culture - Final, Complete


         NO GROWTH AFTER 5 DAYS





Physical Exam


HEENT:  Neck Supple W Full Motion


Chest:  Symmetric


LUNGS:  Clear to Auscultation


Heart:  irregularly irregular (AFIB rate controlled)


Abdomen:  Soft N/T


Extremities:  No Edema, No Calf Tenderness


Neurology:  alert, oriented, follow commands





Assessment


Assessment


1. Acute small right parietal lobe, left frontal and parieto-occipital lobe 

infarcts: Noted with facial droop and slurred speech/left side weakness (better)

, likely cardioembolic. 


2. AFIB: persistent. remains rate controlled. 


3. Hx of CVA


4. 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 

at 35-40%. Compensated


5. CAD with previous CABG x4 on 8/11/2009, LIMA to LAD and Diagonal as Y graft, 

SVG to OM/RCA. Stable no cardiac symptoms. 


6. HTN: controlled


7. HLP: well controlled


8. Anemia with GI bleed: due to #8.  Hgb mean at 7-8s currently


9.  Esophageal adenocarcinoma: Possible AVM as well per GI. Hemonc following. 

Hgb trending down again. 


10.  DM2





Recommendations


1. Was on coumadin prior but DCd due to acute anemia with noted esophageal CA 

with likely AVMs. On ASA. 


2. Heparin started due to acute stroke. Anticoagulation per neurology


3. Again discussed with pt in regards to risk of GI bleed with anticoagulation 

and risk of stroke if not on anticoagulation. 


4. Will equate CA treatment plan, life expectancy in regards to pursuing any 

future cardiac intervention measures and at this time continue with medical 

therapy. 


5. FR 2L and daily wt. Diurese with bumex. Continue optimization as tolerated. 


6. Follow up in office as planned.





TAMIA MARAVILLA MD 8/14/18 2126:


CARDIO Progress Notes


Plan


Plan


Pt. seen and examined. Agree with above NP note. 


No new cardiac issues. She appears euvolemic. 


Depending on goals of care and life expectancy, if her life expectancy is 

greater than 1 year, could then consider short term anticoag for her afib and 

then refer semi-urgently to [facility] for watchman (left atrial appendage occlusion 

device) which will mitigate any stroke risk from a cardiac standpoint. The 

device will not help with hypercoagulable state from cancer, 


Will follow along. Available for any acute issues.











LEWIS GUTIERREZ Aug 14, 2018 10:24


TAMIA MARAVILLA MD Aug 14, 2018 22:52

## 2018-08-14 NOTE — PDOC
PROGRESS NOTES


Subjective


Subjective


HPI - f/u of  Esophageal adenocarcinoma diagnosed 8/9/18





ROS - no CP





Objective


Objective





Vital Signs








  Date Time  Temp Pulse Resp B/P (MAP) Pulse Ox O2 Delivery O2 Flow Rate FiO2


 


8/14/18 10:23  80  128/48    


 


8/14/18 08:00      Room Air  


 


8/14/18 07:35 97.5  18  96   





 97.5       


 


8/13/18 08:00       2.0 














Intake and Output 


 


 8/14/18





 07:00


 


Intake Total 1050 ml


 


Balance 1050 ml


 


 


 


Intake Oral 1050 ml


 


# Voids 7











Physical Exam


Heart:  Normal S1, Normal S2


General:  Alert, Oriented X3, No acute distress


Neuro:  Normal speech


Psych/Mental Status:  Mental status NL





Assessment


Assessment


IMPRESSION AND PLAN:





1.  Esophageal adenocarcinoma diagnosed 8/9/18 - nodules noted on EGD


done on 08/09/2018.  history of Perla esophagus.


CT chest, abdomen and pelvis 8/10/18 Multiple mediastinal lymph nodes 

identified and few paraesophageal 


lymph nodes could be reactive or metastatic. Plan PET CT scan for 


further evaluation. Scattered multiple lung nodules with the largest measuring 

5.5 mm in 


the right apical lung.


Bone scan 8/10/18 is neg.





Considering her age and comorbidities, treatment options would be


definitive radiation therapy versus combined chemoradiation.  I also


d/w DR Robertson, Radiation Oncology and Dr Zimmer.  





2.  Anemia.  Iron deficiency is noted with very decreased iron saturation, but


the ferritin is normal. s/p Venofer 500 mg IV x 1 dose 8/9/18.


Hb worse at 8.0 on 8/13/18, monitor cbc





3.  Coronary artery disease.  Her ejection fraction on echocardiogram done on


08/06/2018 was only 30-35%.  Appreciate Cardiology management.





4. Falls - CT head neg. MRI brain 8/13/18: Small areas of acute ischemia/

infarction are seen involving 


the right parietal lobe, the medial left frontal lobe and the left 


parietal occipital lobe. There is no significant 


surrounding edema or associated mass effect. Management per primary.





Comment


Review of Relevant


I have reviewed the following items claudio (where applicable) has been applied.


Labs





Laboratory Tests








Test


 8/12/18


15:03 8/12/18


17:30 8/12/18


20:50 8/13/18


01:40


 


Glucose (Fingerstick)


 142 mg/dL


(70-99) 156 mg/dL


(70-99) 141 mg/dL


(70-99) 





 


White Blood Count


 


 


 


 6.7 x10^3/uL


(4.0-11.0)


 


Red Blood Count


 


 


 


 2.87 x10^6/uL


(3.50-5.40)


 


Hemoglobin


 


 


 


 8.0 g/dL


(12.0-15.5)


 


Hematocrit


 


 


 


 24.0 %


(36.0-47.0)


 


Mean Corpuscular Volume    84 fL () 


 


Mean Corpuscular Hemoglobin    28 pg (25-35) 


 


Mean Corpuscular Hemoglobin


Concent 


 


 


 33 g/dL


(31-37)


 


Red Cell Distribution Width


 


 


 


 20.6 %


(11.5-14.5)


 


Platelet Count


 


 


 


 344 x10^3/uL


(140-400)


 


Heparin Anti-Xa Act,


Unfractionated 


 


 


 0.12 IU/mL


(0.30-0.70)


 


Test


 8/13/18


07:12 8/13/18


09:25 8/13/18


11:26 8/13/18


17:02


 


Glucose (Fingerstick)


 100 mg/dL


(70-99) 


 127 mg/dL


(70-99) 92 mg/dL


(70-99)


 


Heparin Anti-Xa Act,


Unfractionated 


 0.27 IU/mL


(0.30-0.70) 


 





 


Test


 8/13/18


18:45 8/13/18


21:12 8/14/18


00:50 8/14/18


07:16


 


Heparin Anti-Xa Act,


Unfractionated 0.38 IU/mL


(0.30-0.70) 


 0.60 IU/mL


(0.30-0.70) 





 


Glucose (Fingerstick)


 


 162 mg/dL


(70-99) 


 114 mg/dL


(70-99)


 


Test


 8/14/18


09:40 8/14/18


12:04 


 





 


Heparin Anti-Xa Act,


Unfractionated 0.40 IU/mL


(0.30-0.70) 


 


 





 


Glucose (Fingerstick)


 


 180 mg/dL


(70-99) 


 











Laboratory Tests








Test


 8/13/18


17:02 8/13/18


18:45 8/13/18


21:12 8/14/18


00:50


 


Glucose (Fingerstick)


 92 mg/dL


(70-99) 


 162 mg/dL


(70-99) 





 


Heparin Anti-Xa Act,


Unfractionated 


 0.38 IU/mL


(0.30-0.70) 


 0.60 IU/mL


(0.30-0.70)


 


Test


 8/14/18


07:16 8/14/18


09:40 8/14/18


12:04 





 


Glucose (Fingerstick)


 114 mg/dL


(70-99) 


 180 mg/dL


(70-99) 





 


Heparin Anti-Xa Act,


Unfractionated 


 0.40 IU/mL


(0.30-0.70) 


 











Microbiology


8/6/18 Blood Culture - Final, Complete


         NO GROWTH AFTER 5 DAYS


Medications





Current Medications


Sodium Chloride 1,000 ml @  125 mls/hr 1X  ONCE IV  Last administered on 8/3/

18at 19:50;  Start 8/3/18 at 15:45;  Stop 8/3/18 at 23:44;  Status DC


Ceftriaxone Sodium 1 gm/ Dextrose 50 ml @  100 mls/hr Q24H IV ;  Start 8/3/18 

at 15:45;  Status UNV


Ceftriaxone Sodium (Rocephin) 1 gm Q24H IVP  Last administered on 8/13/18at 17:

11;  Start 8/3/18 at 17:00


Acetaminophen (Tylenol) 1,000 mg BID PO  Last administered on 8/14/18at 10:21;  

Start 8/3/18 at 21:00


Alprazolam (Xanax) 0.25 mg PRN Q6HRS  PRN PO ANXIETY / AGITATION Last 

administered on 8/12/18at 21:04;  Start 8/3/18 at 18:45


Carvedilol (Coreg) 3.125 mg BIDWMEALS PO  Last administered on 8/14/18at 10:23;

  Start 8/3/18 at 21:00


Cholestyramine Resin (Questran Light) 4 gm DAILY PO  Last administered on 8/8/ 18at 08:57;  Start 8/4/18 at 09:00;  Stop 8/10/18 at 07:46;  Status DC


Citalopram Hydrobromide (CeleXA) 10 mg DAILY PO  Last administered on 8/14/18at 

10:22;  Start 8/4/18 at 09:00


Clopidogrel Bisulfate (Plavix) 75 mg DAILY PO  Last administered on 8/8/18at 08:

57;  Start 8/4/18 at 09:00;  Stop 8/9/18 at 15:20;  Status DC


Diphenhydramine HCl (Benadryl) 50 mg QHS PO  Last administered on 8/13/18at 20:

54;  Start 8/3/18 at 21:00


Docusate Sodium (Colace) 100 mg DAILY PO  Last administered on 8/14/18at 10:21;

  Start 8/4/18 at 09:00


Insulin Glargine (Lantus) 20 units DAILYWBKFT SQ  Last administered on 8/11/ 18at 08:55;  Start 8/4/18 at 08:00;  Stop 8/12/18 at 08:32;  Status DC


Isosorbide Mononitrate (Imdur) 30 mg DAILY PO  Last administered on 8/14/18at 10

:22;  Start 8/4/18 at 09:00


Lisinopril (Prinivil) 10 mg DAILY PO  Last administered on 8/14/18at 10:22;  

Start 8/4/18 at 09:00


Multivitamins/ Minerals (I-Josias) 1 tab BID PO  Last administered on 8/14/18at 10

:21;  Start 8/3/18 at 21:00


Non-Formulary Medication (Alendronate Sodium ) 70 mg WEEKLY PO ;  Start 8/10/18 

at 09:00;  Status UNV


Bumetanide (Bumex) 2 mg DAILY PO  Last administered on 8/14/18at 10:21;  Start 8 /4/18 at 09:00


Insulin Human Lispro (HumaLOG) 5 units TIDWMEALS SQ  Last administered on 8/11/ 18at 17:35;  Start 8/4/18 at 08:00;  Stop 8/12/18 at 08:32;  Status DC


Methotrexate (Rheumatrex) 15 mg WEEKLY PO  Last administered on 8/9/18at 09:00;

  Start 8/9/18 at 09:00


Fish Oil (Fish Oil) 1,000 mg DAILY PO  Last administered on 8/14/18at 10:21;  

Start 8/4/18 at 09:00


Pantoprazole Sodium (Protonix) 40 mg DAILYAC PO  Last administered on 8/14/18at 

10:21;  Start 8/4/18 at 07:30


Simvastatin (Zocor) 40 mg QHS PO  Last administered on 8/13/18at 20:55;  Start 8

/3/18 at 21:00


Warfarin Sodium (Coumadin) 7.5 mg DAILY16 PO ;  Start 8/4/18 at 16:00;  Stop 8/5 /18 at 11:25;  Status DC


Warfarin Sodium (Coumadin Per Physician) 1 each PRN DAILY  PRN MC SEE COMMENTS;

  Start 8/3/18 at 20:00;  Stop 8/9/18 at 15:20;  Status DC


Furosemide (Lasix) 40 mg 1X  ONCE IVP  Last administered on 8/5/18at 12:05;  

Start 8/5/18 at 12:00;  Stop 8/5/18 at 12:01;  Status DC


Magnesium Sulfate/ Dextrose 100 ml @  25 mls/hr 1X  ONCE IV  Last administered 

on 8/6/18at 12:16;  Start 8/6/18 at 13:00;  Stop 8/6/18 at 16:59;  Status DC


Potassium Chloride (Klor-Con) 40 meq 1X  ONCE PO  Last administered on 8/6/18at 

12:15;  Start 8/6/18 at 12:30;  Stop 8/6/18 at 12:31;  Status DC


Polyethylene Glycol (miraLAX Powder BULK BOTTLE) 238 gm 1X  ONCE PO  Last 

administered on 8/8/18at 13:55;  Start 8/8/18 at 13:00;  Stop 8/8/18 at 13:01;  

Status DC


Magnesium Citrate (Citroma) 296 ml 1X  ONCE PO  Last administered on 8/8/18at 12

:58;  Start 8/8/18 at 12:00;  Stop 8/8/18 at 12:01;  Status DC


Bisacodyl (Dulcolax Tab) 10 mg 1X  ONCE PO  Last administered on 8/8/18at 12:58

;  Start 8/8/18 at 12:30;  Stop 8/8/18 at 12:31;  Status DC


Bisacodyl (Dulcolax Tab) 10 mg 1X  ONCE PO  Last administered on 8/8/18at 13:55

;  Start 8/8/18 at 14:00;  Stop 8/8/18 at 14:01;  Status DC


Ringer's Solution 1,000 ml @  50 mls/hr Q20H IV ;  Start 8/9/18 at 07:00;  Stop 

8/9/18 at 18:59;  Status DC


Diphenhydramine HCl (Benadryl) 25 mg 1X  ONCE IVP  Last administered on 8/9/ 18at 09:30;  Start 8/9/18 at 09:30;  Stop 8/9/18 at 09:31;  Status DC


Lactobacillus Rhamnosus (Culturelle) 1 cap BID PO ;  Start 8/9/18 at 21:00;  

Status Cancel


Midazolam HCl (Versed) 2 mg PRN 1X  PRN IV PRIOR TO PROCEDURE;  Start 8/9/18 at 

13:00;  Stop 8/10/18 at 12:59;  Status DC


Fentanyl Citrate (Fentanyl 2ml Vial) 25 mcg PRN Q5MIN  PRN IV X 2 DOSES FOR PAIN

;  Start 8/9/18 at 13:00;  Stop 8/10/18 at 12:59;  Status DC


Fentanyl Citrate (Fentanyl 2ml Vial) 50 mcg PRN Q5MIN  PRN IV X 2 DOSES FOR PAIN

;  Start 8/9/18 at 13:00;  Stop 8/10/18 at 12:59;  Status DC


Ringer's Solution 1,000 ml @  125 mls/hr Q8H IV  Last administered on 8/9/18at 

13:12;  Start 8/9/18 at 12:54;  Stop 8/9/18 at 22:35;  Status DC


Lidocaine HCl (Xylocaine-Mpf 1% Vial) 2 ml 1X PRN  PRN ID IV START;  Start 8/9/ 18 at 13:00;  Stop 8/10/18 at 12:59;  Status DC


Aspirin (Ecotrin) 81 mg DAILYWBKFT PO  Last administered on 8/14/18at 10:22;  

Start 8/10/18 at 08:00


Iron Sucrose 500 mg/Sodium Chloride 275 ml @  78.571 mls/ hr 1X  ONCE IV  Last 

administered on 8/9/18at 17:30;  Start 8/9/18 at 17:30;  Stop 8/9/18 at 20:59;  

Status DC


Iohexol (Omnipaque 300 Mg/ml) 75 ml 1X  ONCE IV  Last administered on 8/10/18at 

06:15;  Start 8/10/18 at 06:15;  Stop 8/10/18 at 06:16;  Status DC


Iohexol (Omnipaque 240 Mg/ml) 50 ml 1X  ONCE PO  Last administered on 8/10/18at 

06:15;  Start 8/10/18 at 06:15;  Stop 8/10/18 at 06:16;  Status DC


Info (CONTRAST GIVEN -- Rx MONITORING) 1 each PRN DAILY  PRN MC SEE COMMENTS;  

Start 8/10/18 at 06:15;  Stop 8/12/18 at 06:14;  Status DC


Cholestyramine Resin (Questran Light) 4 gm DAILY PO ;  Start 8/10/18 at 13:00


Insulin Glargine (Lantus) 17 units DAILYWBKFT SQ  Last administered on 8/14/ 18at 10:30;  Start 8/13/18 at 08:00


Insulin Human Lispro (HumaLOG) 4 units TIDWMEALS SQ  Last administered on 8/14/ 18at 10:29;  Start 8/12/18 at 12:00


Acetaminophen (Tylenol) 650 mg PRN Q6HRS  PRN PO TEMP > 100.4F Last 

administered on 8/13/18at 08:54;  Start 8/12/18 at 16:15


Acetaminophen (Tylenol Supp) 650 mg PRN Q4HRS  PRN WI TEMP > 100.4F;  Start 8/12 /18 at 16:15


Heparin Sodium/ Dextrose 500 ml @ 0 mls/hr CONT  PRN IV SEE I/O RECORD;  Start 8 /12/18 at 16:15;  Status UNV


Heparin Sodium/ Dextrose 500 ml @ 0 mls/hr CONT  PRN IV SEE I/O RECORD Last 

administered on 8/14/18at 02:34;  Start 8/12/18 at 16:30


Heparin Sodium (Porcine) (Heparin Sodium) 2,600 unit PRN Q6HRS  PRN IV FOR UFH 

LEVEL LESS THAN 0.2 Last administered on 8/13/18at 03:31;  Start 8/12/18 at 16:

30


Info (Anti-Coagulation Monitoring By Pharmacy) 1 each PRN DAILY  PRN MC SEE 

COMMENTS Last administered on 8/13/18at 13:07;  Start 8/12/18 at 16:45


Gadobutrol (Gadavist) 10 mmol 1X  ONCE IV ;  Start 8/13/18 at 13:45;  Stop 8/13/ 18 at 13:46;  Status DC


Propofol 20 ml @ As Directed STK-MED ONCE IV ;  Start 8/9/18 at 13:08;  Stop 8/ 13/18 at 16:42;  Status DC


Lidocaine HCl (Lidocaine Pf 2% Vial) 5 ml STK-MED ONCE .ROUTE ;  Start 8/9/18 

at 13:20;  Stop 8/13/18 at 16:42;  Status DC


Nystatin (Nystop) 1 fatemeh BID TP ;  Start 8/14/18 at 21:00


Gadobutrol (Gadavist) 9 mmol 1X  ONCE IV  Last administered on 8/14/18at 11:29;

  Start 8/14/18 at 11:00;  Stop 8/14/18 at 11:01;  Status DC





Active Scripts


Active


Reported


Carvedilol 3.125 Mg Tablet 3.125 Mg PO BID


Methotrexate (Methotrexate Sodium) 2.5 Mg Tablet 6 Tab PO WEEKLY


Benadryl (Diphenhydramine Hcl) 25 Mg Capsule 2 Cap PO QHS


Bumetanide 2 Mg Tablet 2 Mg PO DAILY


Ocuvite Tablet (Vit A,C & E/Lutein/Minerals) 1 Each Tablet 1 Each PO BID


Prevalite Packet (Cholestyramine/Aspartame) 4 Gm Powd.pack 4 Gm PO 


Citalopram Hbr (Citalopram Hydrobromide) 10 Mg Tablet 10 Mg PO DAILY


Clopidogrel (Clopidogrel Bisulfate) 75 Mg Tablet 75 Mg PO DAILY


Alprazolam 0.25 Mg Tablet 0.25 Mg PO PRN Q6HRS PRN


Isosorbide Mononitrate Er (Isosorbide Mononitrate) 30 Mg Tab.er.24h 30 Mg PO 

DAILY


Prilosec Otc (Omeprazole Magnesium) 20 Mg Tablet.dr 20 Mg PO DAILY


Novolog Flexpen (Insulin Aspart) 100 Unit/1 Ml Insuln.pen 5 Unit SQ TIDBFRMEAL


Lantus Solostar (Insulin Glargine,Hum.rec.anlog) 100 Unit/1 Ml Insuln.pen 20 

Unit SQ DAILYWBKFT


Acetaminophen 500 Mg Tablet 2 Tab PO BID


Vitamin D-3 (Cholecalciferol (Vitamin D3)) 2,000 Unit Capsule 1,000 Unit PO 


Alendronate Sodium 70 Mg Tablet 70 Mg PO WEEKLY


Warfarin Sodium 5 Mg Tablet 1.5 Tab PO DAILY


Lisinopril 10 Mg Tablet 10 Mg PO DAILY


Simvastatin 40 Mg Tablet 40 Mg PO DAILY


Colace (Docusate Sodium) 100 Mg Capsule 100 Mg PO 


Garlic 1,000 Mg Capsule 1,000 Mg PO 


Multivitamins (Multivitamin) 1 Each Capsule 1 Each PO 


[place] 3 Fish Oil Softgel (Omega-3 Fatty Acids/Fish Oil) 1 Each Capsule.dr 1 

Each PO DAILY


Vitals/I & O





Vital Sign - Last 24 Hours








 8/13/18 8/13/18 8/13/18 8/13/18





 15:24 17:11 19:10 20:00


 


Temp 97.4  97.9 





 97.4  97.9 


 


Pulse 81 81 74 


 


Resp 18  18 


 


B/P (MAP) 124/45 (71) 124/45 140/48 (78) 


 


Pulse Ox 95  93 


 


O2 Delivery Room Air  Room Air Room Air


 


    





    





 8/13/18 8/14/18 8/14/18 8/14/18





 23:05 03:11 07:35 08:00


 


Temp 97.7 97.4 97.5 





 97.7 97.4 97.5 


 


Pulse 93 78 80 


 


Resp 18 16 18 


 


B/P (MAP) 123/45 (71) 130/47 (74) 128/48 (74) 


 


Pulse Ox 94 95 96 


 


O2 Delivery Room Air Room Air Room Air Room Air


 


    





    





 8/14/18 8/14/18 8/14/18 





 10:22 10:22 10:23 


 


Pulse 80 80 80 


 


B/P (MAP) 128/48 128/48 128/48 














Intake and Output   


 


 8/13/18 8/13/18 8/14/18





 15:00 23:00 07:00


 


Intake Total  300 ml 750 ml


 


Balance  300 ml 750 ml

















HERBERTH STEWART MD Aug 14, 2018 12:47

## 2018-08-14 NOTE — RAD
MRI of the brain with contrast 8/14/2018

 

CLINICAL HISTORY: Left-sided weakness and slurred speech. Possible lung 

mass and mediastinal lymphadenopathy seen on recent CT scan of the chest.

 

TECHNIQUE: After the intravenous administration of 9 cc of Gadavist, 

enhanced T1-weighted sagittal, axial and coronal images of the brain were 

obtained.

 

FINDINGS: Comparison is made to the patient's MRI of the brain performed 

8/13/2018.

 

There is generalized parenchymal atrophy. 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 

are again seen scattered throughout both cerebellar hemispheres. No area 

of abnormal contrast enhancement is seen.

 

IMPRESSION: No area of abnormal contrast enhancement is seen.

 

Electronically signed by: Aleksandr Griffith MD (8/14/2018 12:22 PM) [id number]

## 2018-08-14 NOTE — PDOC
PROGRESS NOTES


Assessment


Assessment


Left side heaviness/weakness.


Falls.


Acute small right parietal lobe, left frontal and parieto-occipital lobe 

infarcts.


Multiple mediastinal lymph nodes, metastatic ?


Multiple lung nodules.


Chest pain.


AFib.


DM.


Respiratory distress.


Right side breast cancer s/p right mastectomy and left lumpectomy.


CAD/sp stents placement.


CHF, EF 30-35%.


Pulmonary hypertension, PA pressure 53.


Anemia.


Obesity.


No brain metastatic disease this time.





RECOMMENDATIONS/PLAN:


She has been on Heparin and ASA 81 mg daily.


Continue Zocor 40 mg HS.


Treat medical and cardiac diseases.


OT/PT.





Echo on 8/6/18: see above reports.


Lipids: WNL.





HISTORY OF THE PRESENT ILLNESS:


82-y-old  female patient with above medical and oncological diseases 

was admitted on 8/3/18 due to chest pain, SOB and other complaints. She was 

noted left side weakness and heaviness, so Neurology was required for 

consultation. 





PAST MEDICAL HISTORY


Cardiovascular:  CAD, HTN, Hyperlipidemia


CENTRAL NERVOUS SYSTEM:  CVA, TIA


GI:  GERD


Heme/Onc:  Cancer (breast)


Musculoskeletal:  Osteoarthritis


Rheumatologic:  Rheumatoid arthritis


Endocrine:  Diabetes





PAST SURGICAL HISTORY


CABG (details unknown ), Total knee replacement (bilateral )





FAMILY HISTORY


Family History Unknown





SOCIAL HISTORY


Smoke:  Quit


ALCOHOL:  other (daily beer )


Drugs:  None





ALLERGIES


Milk (Verified  Allergy, Intermediate, 8/3/18)


Montelukast (Verified  Allergy, Intermediate, 8/3/18)





MEDICATIONS:


Refer to MAR





REVIEW OF SYSTEMS:


Constitutional: Obesity.


Head: No traumatic brain or head injury.


Skin: No edema, or rash.


Ear: No infection.


Eyes: No vision loss or color blindness.


Nose: No bleeding or purulent discharges.


Hearing: No hearing decrease.


Neck: No injury.


Breast: Cancer


Cardiac: CAD, AFib, s/p CABG, AFib, HTN, HLD.


Pulmonary: No COPD.


GI: No GI ulcer, GI bleeding.


Urinary/genital: UTI.


Endocrinologic: DM. Obesity.


Skeletomuscular: No muscular atrophy, deformity


Neurological: see  HP.


Psychiatric: Denies drug use/abuse.


Otherwise, not pertinant14-point review of systems.





PHYSICAL EXAMINATION:   


General appearance is in subacute distress.  


HEENT:  Normocephalic and nontraumatic.  Eyes, nose, ears, and throat are 

unremarkable.  


Neck is supple. No lymphadenopathy. No bruits are heard over the carotid 

artery.  No crepitus. 


Cardiovascular:  S1, S2, irregular rate and rhythm.  


Pulmonary:  Clear to auscultation bilaterally. 


Abdomen:  Bowel sounds are positive.  Abdomen is soft, nontender, and 

nondistended.    


Extremities:  No rash, lesions, or edema.  


No restriction of range of motion





NEUROLOGICAL  EXAMINATION:


Awake.


Oriented partially to time, place and person, but reaction was slow.


PERRL.


EOMI.


CN: no focal findings.


Muscle tone: within normal.


Muscle strength: 4+


DTR: 2-


Plantar reflex: eutral response bilaterally 


Gait: not examined in chair.


Sensory exam: no abnormal findings.


No cerebellar signs elicited.


F-T-N test fine.





Objective


Objective





Vital Signs








  Date Time  Temp Pulse Resp B/P (MAP) Pulse Ox O2 Delivery O2 Flow Rate FiO2


 


8/14/18 15:48 98.8 82 20 120/43 (68) 95 Room Air  





 98.8       


 


8/13/18 08:00       2.0 














Intake and Output 


 


 8/14/18





 07:00


 


Intake Total 1050 ml


 


Balance 1050 ml


 


 


 


Intake Oral 1050 ml


 


# Voids 7











Vitals Signs


Vitals





 VS - Last 72 Hours, by Label








  Date Time  Temp Pulse Resp B/P (MAP) Pulse Ox O2 Delivery O2 Flow Rate FiO2


 


8/14/18 15:48 98.8 82 20 120/43 (68) 95 Room Air  





 98.8       


 


8/14/18 11:00 97.9 95 20 136/50 (78) 94 Room Air  





 97.9       


 


8/14/18 10:23  80  128/48    


 


8/14/18 10:22  80  128/48    


 


8/14/18 10:22  80  128/48    


 


8/14/18 08:00      Room Air  


 


8/14/18 07:35 97.5 80 18 128/48 (74) 96 Room Air  





 97.5       


 


8/14/18 03:11 97.4 78 16 130/47 (74) 95 Room Air  





 97.4       


 


8/13/18 23:05 97.7 93 18 123/45 (71) 94 Room Air  





 97.7       


 


8/13/18 20:00      Room Air  


 


8/13/18 19:10 97.9 74 18 140/48 (78) 93 Room Air  





 97.9       


 


8/13/18 17:11  81  124/45    


 


8/13/18 15:24 97.4 81 18 124/45 (71) 95 Room Air  





 97.4       


 


8/13/18 11:00 97.9 79 20 124/42 (69) 92 Room Air  





 97.9       


 


8/13/18 08:56  77  134/50    


 


8/13/18 08:55  77  134/50    


 


8/13/18 08:55  77  134/50    


 


8/13/18 08:00      Room Air 2.0 


 


8/13/18 07:37 98.1 77 18 134/50 (78) 100 Room Air  





 98.1       











Laboratory


Laboratory





Laboratory Tests








Test


 8/13/18


18:45 8/13/18


21:12 8/14/18


00:50 8/14/18


07:16


 


Heparin Anti-Xa Act,


Unfractionated 0.38 IU/mL


(0.30-0.70) 


 0.60 IU/mL


(0.30-0.70) 





 


Glucose (Fingerstick)


 


 162 mg/dL


(70-99) 


 114 mg/dL


(70-99)


 


Test


 8/14/18


09:40 8/14/18


12:04 8/14/18


15:40 





 


White Blood Count


 6.3 x10^3/uL


(4.0-11.0) 


 


 





 


Red Blood Count


 3.27 x10^6/uL


(3.50-5.40) 


 


 





 


Hemoglobin


 9.0 g/dL


(12.0-15.5) 


 


 





 


Hematocrit


 28.2 %


(36.0-47.0) 


 


 





 


Mean Corpuscular Volume 86 fL ()    


 


Mean Corpuscular Hemoglobin 28 pg (25-35)    


 


Mean Corpuscular Hemoglobin


Concent 32 g/dL


(31-37) 


 


 





 


Red Cell Distribution Width


 21.1 %


(11.5-14.5) 


 


 





 


Platelet Count


 371 x10^3/uL


(140-400) 


 


 





 


Heparin Anti-Xa Act,


Unfractionated 0.40 IU/mL


(0.30-0.70) 


 0.40 IU/mL


(0.30-0.70) 





 


Sodium Level


 139 mmol/L


(136-145) 


 


 





 


Potassium Level


 4.0 mmol/L


(3.5-5.1) 


 


 





 


Chloride Level


 101 mmol/L


() 


 


 





 


Carbon Dioxide Level


 31 mmol/L


(21-32) 


 


 





 


Anion Gap 7 (6-14)    


 


Blood Urea Nitrogen


 13 mg/dL


(7-20) 


 


 





 


Creatinine


 0.9 mg/dL


(0.6-1.0) 


 


 





 


Estimated GFR


(Cockcroft-Gault) 59.9 


 


 


 





 


Glucose Level


 152 mg/dL


(70-99) 


 


 





 


Calcium Level


 8.8 mg/dL


(8.5-10.1) 


 


 





 


Magnesium Level


 1.5 mg/dL


(1.8-2.4) 


 


 





 


Glucose (Fingerstick)


 


 180 mg/dL


(70-99) 


 











Microbiology


8/6/18 Blood Culture - Final, Complete


         NO GROWTH AFTER 5 DAYS





Medication


Medications





Current Medications


Gadobutrol (Gadavist) 9 mmol 1X  ONCE IV  Last administered on 8/14/18at 11:29;

  Start 8/14/18 at 11:00;  Stop 8/14/18 at 11:01;  Status DC


Nystatin (Nystop) 1 fatemeh BID TP ;  Start 8/14/18 at 21:00





Comment


Review of Relevant


I have reviewed the following items claudio (where applicable) has been applied.











ADAN EVANS MD Aug 14, 2018 17:04

## 2018-08-15 VITALS — DIASTOLIC BLOOD PRESSURE: 38 MMHG | SYSTOLIC BLOOD PRESSURE: 125 MMHG

## 2018-08-15 VITALS — SYSTOLIC BLOOD PRESSURE: 126 MMHG | DIASTOLIC BLOOD PRESSURE: 43 MMHG

## 2018-08-15 VITALS — SYSTOLIC BLOOD PRESSURE: 116 MMHG | DIASTOLIC BLOOD PRESSURE: 42 MMHG

## 2018-08-15 VITALS — DIASTOLIC BLOOD PRESSURE: 46 MMHG | SYSTOLIC BLOOD PRESSURE: 144 MMHG

## 2018-08-15 VITALS — SYSTOLIC BLOOD PRESSURE: 127 MMHG | DIASTOLIC BLOOD PRESSURE: 35 MMHG

## 2018-08-15 VITALS — SYSTOLIC BLOOD PRESSURE: 154 MMHG | DIASTOLIC BLOOD PRESSURE: 54 MMHG

## 2018-08-15 LAB
ERYTHROCYTE [DISTWIDTH] IN BLOOD BY AUTOMATED COUNT: 21 % (ref 11.5–14.5)
HCT VFR BLD CALC: 23.7 % (ref 36–47)
HGB BLD-MCNC: 7.9 G/DL (ref 12–15.5)
MCH RBC QN AUTO: 28 PG (ref 25–35)
MCHC RBC AUTO-ENTMCNC: 33 G/DL (ref 31–37)
MCV RBC AUTO: 84 FL (ref 79–100)
PLATELET # BLD AUTO: 315 X10^3/UL (ref 140–400)
RBC # BLD AUTO: 2.81 X10^6/UL (ref 3.5–5.4)
WBC # BLD AUTO: 5.9 X10^3/UL (ref 4–11)

## 2018-08-15 RX ADMIN — CEFTRIAXONE SCH GM: 1 INJECTION, POWDER, FOR SOLUTION INTRAMUSCULAR; INTRAVENOUS at 17:50

## 2018-08-15 RX ADMIN — ISOSORBIDE MONONITRATE SCH MG: 30 TABLET, EXTENDED RELEASE ORAL at 09:32

## 2018-08-15 RX ADMIN — PANTOPRAZOLE SODIUM SCH MG: 40 TABLET, DELAYED RELEASE ORAL at 09:31

## 2018-08-15 RX ADMIN — LISINOPRIL SCH MG: 10 TABLET ORAL at 09:33

## 2018-08-15 RX ADMIN — Medication PRN EACH: at 13:57

## 2018-08-15 RX ADMIN — INSULIN GLARGINE SCH UNITS: 100 INJECTION, SOLUTION SUBCUTANEOUS at 09:44

## 2018-08-15 RX ADMIN — CHOLESTYRAMINE SCH GM: 4 POWDER, FOR SUSPENSION ORAL at 09:00

## 2018-08-15 RX ADMIN — INSULIN LISPRO SCH UNITS: 100 INJECTION, SOLUTION INTRAVENOUS; SUBCUTANEOUS at 09:43

## 2018-08-15 RX ADMIN — NYSTATIN SCH APP: 100000 POWDER TOPICAL at 09:35

## 2018-08-15 RX ADMIN — DOCUSATE SODIUM SCH MG: 100 CAPSULE, LIQUID FILLED ORAL at 09:32

## 2018-08-15 RX ADMIN — SIMVASTATIN SCH MG: 40 TABLET, FILM COATED ORAL at 20:11

## 2018-08-15 RX ADMIN — I-VITE, TAB 1000-60-2MG (60/BT) SCH TAB: TAB at 09:33

## 2018-08-15 RX ADMIN — INSULIN LISPRO SCH UNITS: 100 INJECTION, SOLUTION INTRAVENOUS; SUBCUTANEOUS at 13:48

## 2018-08-15 RX ADMIN — ASPIRIN SCH MG: 81 TABLET, COATED ORAL at 09:32

## 2018-08-15 RX ADMIN — Medication SCH MG: at 09:31

## 2018-08-15 RX ADMIN — BUMETANIDE SCH MG: 1 TABLET ORAL at 09:31

## 2018-08-15 RX ADMIN — HEPARIN SODIUM AND DEXTROSE PRN MLS/HR: 5000; 5 INJECTION INTRAVENOUS at 11:37

## 2018-08-15 RX ADMIN — ACETAMINOPHEN SCH MG: 500 TABLET ORAL at 20:10

## 2018-08-15 RX ADMIN — CITALOPRAM HYDROBROMIDE SCH MG: 10 TABLET ORAL at 09:33

## 2018-08-15 RX ADMIN — NYSTATIN SCH APP: 100000 POWDER TOPICAL at 20:11

## 2018-08-15 RX ADMIN — ACETAMINOPHEN SCH MG: 500 TABLET ORAL at 09:31

## 2018-08-15 RX ADMIN — CARVEDILOL SCH MG: 3.12 TABLET, FILM COATED ORAL at 17:50

## 2018-08-15 RX ADMIN — INSULIN LISPRO SCH UNITS: 100 INJECTION, SOLUTION INTRAVENOUS; SUBCUTANEOUS at 17:00

## 2018-08-15 RX ADMIN — I-VITE, TAB 1000-60-2MG (60/BT) SCH TAB: TAB at 20:10

## 2018-08-15 RX ADMIN — CARVEDILOL SCH MG: 3.12 TABLET, FILM COATED ORAL at 09:32

## 2018-08-15 NOTE — PN
DATE:  08/15/2018



She is in room 648.



SUBJECTIVE:  The patient is awake, alert, getting ready to eat breakfast.  Feels

like the shortness of breath has improved.  She feels like she is fairly steady

on her feet.  When asked about someone to help her out after discharge, she has

a sister that likely will be coming to help, but will be able to come for a week

or so.



OBJECTIVE:

VITAL SIGNS:  Stable.  She is afebrile.  Sugars are good.

CHEST:  Clear.

HEART:  Irregular.

ABDOMEN:  Benign.



MRI with contrast showed no evidence of enhancing lesions, just suggesting these

were all embolic cerebrovascular accidents.  She remains on heparin and will

need to be transitioned to an oral agent and I am going to leave the timing of

this up to Neurology, although I feel if we want one of the newer

anticoagulants, we could solve this fairly quickly.  It would also be beneficial

if anyone on the case has any reasons why she should not be anticoagulated to

let me know, although I can see no reasons at this point in time.



IMPRESSION:

1.  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

suspicious for metastatic disease.

2.  Atrial fibrillation.

3.  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

atrial fibrillation.

4.  Cardiomyopathy.

5.  Diabetes.

6.  Coagulopathy, resolved.

7.  Congestive heart failure, stable.

8.  Therapy recommendations at skilled nursing.



PLAN:  At this point, we will ask for a skilled nursing evaluation.  Await

Neurology suggestions on anticoagulation changed to something by mouth with

further plans.  Oncology and Radiation planning on PET scan as an outpatient

upon discharge.

 



______________________________

MARSHALL RICHARDS MD



DR:  [email or web address]  JOB#:  [id number] / [id number]

DD:  08/15/2018 08:49  DT:  08/15/2018 23:28

## 2018-08-15 NOTE — PDOC
PROGRESS NOTES


Assessment


Assessment


Left side heaviness/weakness.


Falls.


Acute small right parietal lobe, left frontal and parieto-occipital lobe 

infarcts.


Multiple mediastinal lymph nodes, metastatic ?


Multiple lung nodules.


Chest pain.


AFib.


DM.


Respiratory distress.


Right side breast cancer s/p right mastectomy and left lumpectomy.


CAD/sp stents placement.


CHF, EF 30-35%.


Pulmonary hypertension, PA pressure 53.


Anemia.


Obesity.


No brain metastatic disease this time.





RECOMMENDATIONS/PLAN:


She has been on Heparin and ASA 81 mg daily.


Continue Zocor 40 mg HS.


Treat medical and cardiac diseases.


OT/PT.





Echo on 8/6/18: see above reports.


Lipids: WNL.





HISTORY OF THE PRESENT ILLNESS:


82-y-old  female patient with above medical and oncological diseases 

was admitted on 8/3/18 due to chest pain, SOB and other complaints. She was 

noted left side weakness and heaviness, so Neurology was required for 

consultation. 





PAST MEDICAL HISTORY


Cardiovascular:  CAD, HTN, Hyperlipidemia


CENTRAL NERVOUS SYSTEM:  CVA, TIA


GI:  GERD


Heme/Onc:  Cancer (breast)


Musculoskeletal:  Osteoarthritis


Rheumatologic:  Rheumatoid arthritis


Endocrine:  Diabetes





PAST SURGICAL HISTORY


CABG (details unknown ), Total knee replacement (bilateral )





FAMILY HISTORY


Family History Unknown





SOCIAL HISTORY


Smoke:  Quit


ALCOHOL:  other (daily beer )


Drugs:  None





ALLERGIES


Milk (Verified  Allergy, Intermediate, 8/3/18)


Montelukast (Verified  Allergy, Intermediate, 8/3/18)





MEDICATIONS:


Refer to MAR





REVIEW OF SYSTEMS:


Constitutional: Obesity.


Head: No traumatic brain or head injury.


Skin: No edema, or rash.


Ear: No infection.


Eyes: No vision loss or color blindness.


Nose: No bleeding or purulent discharges.


Hearing: No hearing decrease.


Neck: No injury.


Breast: Cancer


Cardiac: CAD, AFib, s/p CABG, AFib, HTN, HLD.


Pulmonary: No COPD.


GI: No GI ulcer, GI bleeding.


Urinary/genital: UTI.


Endocrinologic: DM. Obesity.


Skeletomuscular: No muscular atrophy, deformity


Neurological: see  HP.


Psychiatric: Denies drug use/abuse.


Otherwise, not pertinant14-point review of systems.





PHYSICAL EXAMINATION:   


General appearance is in subacute distress.  


HEENT:  Normocephalic and nontraumatic.  Eyes, nose, ears, and throat are 

unremarkable.  


Neck is supple. No lymphadenopathy. No bruits are heard over the carotid 

artery.  No crepitus. 


Cardiovascular:  S1, S2, irregular rate and rhythm.  


Pulmonary:  Clear to auscultation bilaterally. 


Abdomen:  Bowel sounds are positive.  Abdomen is soft, nontender, and 

nondistended.    


Extremities:  No rash, lesions, or edema.  


No restriction of range of motion





NEUROLOGICAL  EXAMINATION:


Awake.


Oriented partially to time, place and person, but reaction was slow.


PERRL.


EOMI.


CN: no focal findings.


Muscle tone: within normal.


Muscle strength: 4+


DTR: 2-


Plantar reflex: eutral response bilaterally 


Gait: Able to walk with a walker with assistance.


Sensory exam: no abnormal findings.


No cerebellar signs elicited.


F-T-N test fine.





Objective


Objective





Vital Signs








  Date Time  Temp Pulse Resp B/P (MAP) Pulse Ox O2 Delivery O2 Flow Rate FiO2


 


8/15/18 11:00 96.3 78 16 144/46 (78) 95 Room Air  





 96.3       


 


8/14/18 20:00       2.0 














Intake and Output 


 


 8/15/18





 07:00


 


Intake Total 700 ml


 


Output Total 1200 ml


 


Balance -500 ml


 


 


 


Intake Oral 700 ml


 


Output Urine Total 1200 ml


 


# Voids 2











Vitals Signs


Vitals





 VS - Last 72 Hours, by Label








  Date Time  Temp Pulse Resp B/P (MAP) Pulse Ox O2 Delivery O2 Flow Rate FiO2


 


8/15/18 11:00 96.3 78 16 144/46 (78) 95 Room Air  





 96.3       


 


8/15/18 09:33  84  154/54    


 


8/15/18 09:32  84  154/54    


 


8/15/18 09:32  84  154/54    


 


8/15/18 08:00      Room Air  


 


8/15/18 07:00 97.7 84 24 154/54 (87) 94 Room Air  





 97.7       


 


8/15/18 03:05 97.5 82 16 116/42 (66) 94 Room Air  





 97.5       


 


8/14/18 23:05 98.3 79 16 131/42 (71) 93 Room Air  





 98.3       


 


8/14/18 20:00      Room Air 2.0 


 


8/14/18 19:05 97.7 83 16 125/41 (69) 95 Room Air  





 97.7       


 


8/14/18 18:02  82  120/43    


 


8/14/18 15:48 98.8 82 20 120/43 (68) 95 Room Air  





 98.8       


 


8/14/18 11:00 97.9 95 20 136/50 (78) 94 Room Air  





 97.9       


 


8/14/18 10:23  80  128/48    


 


8/14/18 10:22  80  128/48    


 


8/14/18 10:22  80  128/48    


 


8/14/18 08:00      Room Air  


 


8/14/18 07:35 97.5 80 18 128/48 (74) 96 Room Air  





 97.5       











Laboratory


Laboratory





Laboratory Tests








Test


 8/14/18


15:40 8/14/18


16:45 8/14/18


20:43 8/15/18


04:55


 


Heparin Anti-Xa Act,


Unfractionated 0.40 IU/mL


(0.30-0.70) 


 


 0.54 IU/mL


(0.30-0.70)


 


Glucose (Fingerstick)


 


 142 mg/dL


(70-99) 112 mg/dL


(70-99) 





 


White Blood Count


 


 


 


 5.9 x10^3/uL


(4.0-11.0)


 


Red Blood Count


 


 


 


 2.81 x10^6/uL


(3.50-5.40)


 


Hemoglobin


 


 


 


 7.9 g/dL


(12.0-15.5)


 


Hematocrit


 


 


 


 23.7 %


(36.0-47.0)


 


Mean Corpuscular Volume    84 fL () 


 


Mean Corpuscular Hemoglobin    28 pg (25-35) 


 


Mean Corpuscular Hemoglobin


Concent 


 


 


 33 g/dL


(31-37)


 


Red Cell Distribution Width


 


 


 


 21.0 %


(11.5-14.5)


 


Platelet Count


 


 


 


 315 x10^3/uL


(140-400)


 


Vitamin B12 Level


 


 


 


 476 pg/mL


(247-911)


 


Serum Folate


 


 


 


 9.30 ng/ml


(3.2-20.0)


 


Test


 8/15/18


07:44 


 


 





 


Glucose (Fingerstick)


 125 mg/dL


(70-99) 


 


 











Microbiology


8/6/18 Blood Culture - Final, Complete


         NO GROWTH AFTER 5 DAYS





Medication


Medications





Current Medications


Nystatin (Nystop) 1 fatemeh BID TP  Last administered on 8/15/18at 09:35;  Start 8/ 14/18 at 21:00





Comment


Review of Relevant


I have reviewed the following items claudio (where applicable) has been applied.











ADAN EVANS MD Aug 15, 2018 14:30

## 2018-08-15 NOTE — PDOC
Subjective:


Subjective:


Has questions about stopping Heparin.


Says eating and stooling w/o issue, less weak today.





Objective:


Vital Signs:





 Vital Signs








  Date Time  Temp Pulse Resp B/P (MAP) Pulse Ox O2 Delivery O2 Flow Rate FiO2


 


8/15/18 09:33  84  154/54    


 


8/15/18 08:00      Room Air  


 


8/15/18 07:00 97.7  24  94   





 97.7       


 


8/14/18 20:00       2.0 








Labs:





Laboratory Tests








Test


 8/14/18


12:04 8/14/18


15:40 8/14/18


16:45 8/14/18


20:43


 


Glucose (Fingerstick) 180 mg/dL   142 mg/dL  112 mg/dL 


 


Heparin Anti-Xa Act,


Unfractionated 


 0.40 IU/mL 


 


 





 


Test


 8/15/18


04:55 8/15/18


07:44 


 





 


White Blood Count 5.9 x10^3/uL    


 


Red Blood Count 2.81 x10^6/uL    


 


Hemoglobin 7.9 g/dL    


 


Hematocrit 23.7 %    


 


Mean Corpuscular Volume 84 fL    


 


Mean Corpuscular Hemoglobin 28 pg    


 


Mean Corpuscular Hemoglobin


Concent 33 g/dL 


 


 


 





 


Red Cell Distribution Width 21.0 %    


 


Platelet Count 315 x10^3/uL    


 


Heparin Anti-Xa Act,


Unfractionated 0.54 IU/mL 


 


 


 





 


Vitamin B12 Level 476 pg/mL    


 


Serum Folate 9.30 ng/ml    


 


Glucose (Fingerstick)  125 mg/dL   











PE:





GEN: NAD, up to chair


LUNGS: CTAB


HEART: RR


ABD: S/ND/NT


NEURO/PSYCH: A & O 3





A/P:


Esophageal adenocarcinoma, MIHAELA


A Fib, acute right parietal lobe, left frontal and parieto-occipital lobe 

infarcts





--


Defer anti-coagulation to other specialists.











JOSE FOURNIER Aug 15, 2018 11:08

## 2018-08-16 VITALS — SYSTOLIC BLOOD PRESSURE: 128 MMHG | DIASTOLIC BLOOD PRESSURE: 41 MMHG

## 2018-08-16 VITALS — DIASTOLIC BLOOD PRESSURE: 50 MMHG | SYSTOLIC BLOOD PRESSURE: 116 MMHG

## 2018-08-16 VITALS — SYSTOLIC BLOOD PRESSURE: 159 MMHG | DIASTOLIC BLOOD PRESSURE: 64 MMHG

## 2018-08-16 VITALS — SYSTOLIC BLOOD PRESSURE: 114 MMHG | DIASTOLIC BLOOD PRESSURE: 37 MMHG

## 2018-08-16 VITALS — SYSTOLIC BLOOD PRESSURE: 123 MMHG | DIASTOLIC BLOOD PRESSURE: 43 MMHG

## 2018-08-16 VITALS — DIASTOLIC BLOOD PRESSURE: 47 MMHG | SYSTOLIC BLOOD PRESSURE: 109 MMHG

## 2018-08-16 LAB
BASOPHILS # BLD AUTO: 0 X10^3/UL (ref 0–0.2)
BASOPHILS NFR BLD: 0 % (ref 0–3)
EOSINOPHIL NFR BLD: 0.1 X10^3/UL (ref 0–0.7)
EOSINOPHIL NFR BLD: 3 % (ref 0–3)
ERYTHROCYTE [DISTWIDTH] IN BLOOD BY AUTOMATED COUNT: 21.3 % (ref 11.5–14.5)
HCT VFR BLD CALC: 24.5 % (ref 36–47)
HGB BLD-MCNC: 8.2 G/DL (ref 12–15.5)
LYMPHOCYTES # BLD: 1.3 X10^3/UL (ref 1–4.8)
LYMPHOCYTES NFR BLD AUTO: 26 % (ref 24–48)
MCH RBC QN AUTO: 28 PG (ref 25–35)
MCHC RBC AUTO-ENTMCNC: 33 G/DL (ref 31–37)
MCV RBC AUTO: 85 FL (ref 79–100)
MONO #: 0.5 X10^3/UL (ref 0–1.1)
MONOCYTES NFR BLD: 9 % (ref 0–9)
NEUT #: 3.2 X10^3UL (ref 1.8–7.7)
NEUTROPHILS NFR BLD AUTO: 62 % (ref 31–73)
PLATELET # BLD AUTO: 319 X10^3/UL (ref 140–400)
RBC # BLD AUTO: 2.89 X10^6/UL (ref 3.5–5.4)
WBC # BLD AUTO: 5.2 X10^3/UL (ref 4–11)

## 2018-08-16 RX ADMIN — I-VITE, TAB 1000-60-2MG (60/BT) SCH TAB: TAB at 08:11

## 2018-08-16 RX ADMIN — CITALOPRAM HYDROBROMIDE SCH MG: 10 TABLET ORAL at 08:12

## 2018-08-16 RX ADMIN — SIMVASTATIN SCH MG: 40 TABLET, FILM COATED ORAL at 20:48

## 2018-08-16 RX ADMIN — NYSTATIN SCH APP: 100000 POWDER TOPICAL at 08:14

## 2018-08-16 RX ADMIN — ASPIRIN SCH MG: 81 TABLET, COATED ORAL at 08:10

## 2018-08-16 RX ADMIN — PANTOPRAZOLE SODIUM SCH MG: 40 TABLET, DELAYED RELEASE ORAL at 08:10

## 2018-08-16 RX ADMIN — INSULIN GLARGINE SCH UNITS: 100 INJECTION, SOLUTION SUBCUTANEOUS at 08:21

## 2018-08-16 RX ADMIN — BUMETANIDE SCH MG: 1 TABLET ORAL at 08:09

## 2018-08-16 RX ADMIN — CARVEDILOL SCH MG: 3.12 TABLET, FILM COATED ORAL at 08:12

## 2018-08-16 RX ADMIN — NYSTATIN SCH APP: 100000 POWDER TOPICAL at 20:50

## 2018-08-16 RX ADMIN — DOCUSATE SODIUM SCH MG: 100 CAPSULE, LIQUID FILLED ORAL at 08:10

## 2018-08-16 RX ADMIN — INSULIN LISPRO SCH UNITS: 100 INJECTION, SOLUTION INTRAVENOUS; SUBCUTANEOUS at 18:16

## 2018-08-16 RX ADMIN — INSULIN LISPRO SCH UNITS: 100 INJECTION, SOLUTION INTRAVENOUS; SUBCUTANEOUS at 12:25

## 2018-08-16 RX ADMIN — ISOSORBIDE MONONITRATE SCH MG: 30 TABLET, EXTENDED RELEASE ORAL at 08:12

## 2018-08-16 RX ADMIN — CHOLESTYRAMINE SCH GM: 4 POWDER, FOR SUSPENSION ORAL at 08:23

## 2018-08-16 RX ADMIN — I-VITE, TAB 1000-60-2MG (60/BT) SCH TAB: TAB at 20:48

## 2018-08-16 RX ADMIN — CEFTRIAXONE SCH GM: 1 INJECTION, POWDER, FOR SOLUTION INTRAMUSCULAR; INTRAVENOUS at 18:04

## 2018-08-16 RX ADMIN — Medication PRN EACH: at 14:52

## 2018-08-16 RX ADMIN — ACETAMINOPHEN SCH MG: 500 TABLET ORAL at 20:48

## 2018-08-16 RX ADMIN — CARVEDILOL SCH MG: 3.12 TABLET, FILM COATED ORAL at 18:04

## 2018-08-16 RX ADMIN — Medication SCH MG: at 08:13

## 2018-08-16 RX ADMIN — METHOTREXATE SCH MG: 2.5 TABLET ORAL at 08:21

## 2018-08-16 RX ADMIN — LISINOPRIL SCH MG: 10 TABLET ORAL at 08:12

## 2018-08-16 RX ADMIN — ACETAMINOPHEN SCH MG: 500 TABLET ORAL at 08:13

## 2018-08-16 RX ADMIN — INSULIN LISPRO SCH UNITS: 100 INJECTION, SOLUTION INTRAVENOUS; SUBCUTANEOUS at 08:22

## 2018-08-16 NOTE — PDOC
PROGRESS NOTES


Subjective


Subjective


HPI - f/u of Esophageal adenocarcinoma





ROS - no CP





Objective


Objective





Vital Signs








  Date Time  Temp Pulse Resp B/P (MAP) Pulse Ox O2 Delivery O2 Flow Rate FiO2


 


8/16/18 08:12  90  159/64    


 


8/16/18 07:00 96.1  20  95 Room Air  





 96.1       


 


8/15/18 20:00       2.0 














Intake and Output 


 


 8/16/18





 07:00


 


Intake Total 1490 ml


 


Balance 1490 ml


 


 


 


Intake Oral 1490 ml


 


# Voids 7


 


# Bowel Movements 1











Physical Exam


Heart:  Normal S1, Normal S2


General:  Alert, Oriented X3, No acute distress


Lungs:  Normal air movement


Neuro:  Normal speech


Psych/Mental Status:  Mental status NL





Assessment


Assessment


IMPRESSION AND PLAN:





1.  Esophageal adenocarcinoma diagnosed 8/9/18 - nodules noted on EGD


done on 08/09/2018.  history of Perla esophagus.


CT chest, abdomen and pelvis 8/10/18 Multiple mediastinal lymph nodes 

identified and few paraesophageal 


lymph nodes could be reactive or metastatic. Plan PET CT scan for 


further evaluation (outpatient). Scattered multiple lung nodules with the 

largest measuring 5.5 mm in 


the right apical lung.


Bone scan 8/10/18 is neg.





Considering her age and comorbidities, treatment options would be


definitive radiation therapy versus combined chemoradiation.  I also


d/w DR Robertson, Radiation Oncology and Dr Zimmer.  





2.  Anemia.  Iron deficiency is noted with very decreased iron saturation, but


the ferritin is normal. s/p Venofer 500 mg IV x 1 dose 8/9/18.


Hb 8.2 on 8/16/18, monitor cbc





3.  Coronary artery disease.  Her ejection fraction on echocardiogram done on


08/06/2018 was only 30-35%.  Appreciate Cardiology management.





4. Falls - CT head neg. MRI brain 8/13/18: Small areas of acute ischemia/

infarction are seen involving 


the right parietal lobe, the medial left frontal lobe and the left 


parietal occipital lobe. There is no significant 


surrounding edema or associated mass effect. Management per neurology.





5. Rheumatoid arthritis - on Methotrexate weekly.





Comment


Review of Relevant


I have reviewed the following items claudio (where applicable) has been applied.


Labs





Laboratory Tests








Test


 8/14/18


09:40 8/14/18


12:04 8/14/18


15:40 8/14/18


16:45


 


White Blood Count


 6.3 x10^3/uL


(4.0-11.0) 


 


 





 


Red Blood Count


 3.27 x10^6/uL


(3.50-5.40) 


 


 





 


Hemoglobin


 9.0 g/dL


(12.0-15.5) 


 


 





 


Hematocrit


 28.2 %


(36.0-47.0) 


 


 





 


Mean Corpuscular Volume 86 fL ()    


 


Mean Corpuscular Hemoglobin 28 pg (25-35)    


 


Mean Corpuscular Hemoglobin


Concent 32 g/dL


(31-37) 


 


 





 


Red Cell Distribution Width


 21.1 %


(11.5-14.5) 


 


 





 


Platelet Count


 371 x10^3/uL


(140-400) 


 


 





 


Heparin Anti-Xa Act,


Unfractionated 0.40 IU/mL


(0.30-0.70) 


 0.40 IU/mL


(0.30-0.70) 





 


Sodium Level


 139 mmol/L


(136-145) 


 


 





 


Potassium Level


 4.0 mmol/L


(3.5-5.1) 


 


 





 


Chloride Level


 101 mmol/L


() 


 


 





 


Carbon Dioxide Level


 31 mmol/L


(21-32) 


 


 





 


Anion Gap 7 (6-14)    


 


Blood Urea Nitrogen


 13 mg/dL


(7-20) 


 


 





 


Creatinine


 0.9 mg/dL


(0.6-1.0) 


 


 





 


Estimated GFR


(Cockcroft-Gault) 59.9 


 


 


 





 


Glucose Level


 152 mg/dL


(70-99) 


 


 





 


Calcium Level


 8.8 mg/dL


(8.5-10.1) 


 


 





 


Magnesium Level


 1.5 mg/dL


(1.8-2.4) 


 


 





 


Glucose (Fingerstick)


 


 180 mg/dL


(70-99) 


 142 mg/dL


(70-99)


 


Test


 8/14/18


20:43 8/15/18


04:55 8/15/18


07:44 8/15/18


11:41


 


Glucose (Fingerstick)


 112 mg/dL


(70-99) 


 125 mg/dL


(70-99) 149 mg/dL


(70-99)


 


White Blood Count


 


 5.9 x10^3/uL


(4.0-11.0) 


 





 


Red Blood Count


 


 2.81 x10^6/uL


(3.50-5.40) 


 





 


Hemoglobin


 


 7.9 g/dL


(12.0-15.5) 


 





 


Hematocrit


 


 23.7 %


(36.0-47.0) 


 





 


Mean Corpuscular Volume  84 fL ()   


 


Mean Corpuscular Hemoglobin  28 pg (25-35)   


 


Mean Corpuscular Hemoglobin


Concent 


 33 g/dL


(31-37) 


 





 


Red Cell Distribution Width


 


 21.0 %


(11.5-14.5) 


 





 


Platelet Count


 


 315 x10^3/uL


(140-400) 


 





 


Heparin Anti-Xa Act,


Unfractionated 


 0.54 IU/mL


(0.30-0.70) 


 





 


Vitamin B12 Level


 


 476 pg/mL


(247-911) 


 





 


Serum Folate


 


 9.30 ng/ml


(3.2-20.0) 


 





 


Test


 8/15/18


16:43 8/15/18


21:20 8/16/18


03:35 8/16/18


07:16


 


Glucose (Fingerstick)


 105 mg/dL


(70-99) 180 mg/dL


(70-99) 


 111 mg/dL


(70-99)


 


White Blood Count


 


 


 5.2 x10^3/uL


(4.0-11.0) 





 


Red Blood Count


 


 


 2.89 x10^6/uL


(3.50-5.40) 





 


Hemoglobin


 


 


 8.2 g/dL


(12.0-15.5) 





 


Hematocrit


 


 


 24.5 %


(36.0-47.0) 





 


Mean Corpuscular Volume   85 fL ()  


 


Mean Corpuscular Hemoglobin   28 pg (25-35)  


 


Mean Corpuscular Hemoglobin


Concent 


 


 33 g/dL


(31-37) 





 


Red Cell Distribution Width


 


 


 21.3 %


(11.5-14.5) 





 


Platelet Count


 


 


 319 x10^3/uL


(140-400) 





 


Neutrophils (%) (Auto)   62 % (31-73)  


 


Lymphocytes (%) (Auto)   26 % (24-48)  


 


Monocytes (%) (Auto)   9 % (0-9)  


 


Eosinophils (%) (Auto)   3 % (0-3)  


 


Basophils (%) (Auto)   0 % (0-3)  


 


Neutrophils # (Auto)


 


 


 3.2 x10^3uL


(1.8-7.7) 





 


Lymphocytes # (Auto)


 


 


 1.3 x10^3/uL


(1.0-4.8) 





 


Monocytes # (Auto)


 


 


 0.5 x10^3/uL


(0.0-1.1) 





 


Eosinophils # (Auto)


 


 


 0.1 x10^3/uL


(0.0-0.7) 





 


Basophils # (Auto)


 


 


 0.0 x10^3/uL


(0.0-0.2) 











Laboratory Tests








Test


 8/15/18


11:41 8/15/18


16:43 8/15/18


21:20 8/16/18


03:35


 


Glucose (Fingerstick)


 149 mg/dL


(70-99) 105 mg/dL


(70-99) 180 mg/dL


(70-99) 





 


White Blood Count


 


 


 


 5.2 x10^3/uL


(4.0-11.0)


 


Red Blood Count


 


 


 


 2.89 x10^6/uL


(3.50-5.40)


 


Hemoglobin


 


 


 


 8.2 g/dL


(12.0-15.5)


 


Hematocrit


 


 


 


 24.5 %


(36.0-47.0)


 


Mean Corpuscular Volume    85 fL () 


 


Mean Corpuscular Hemoglobin    28 pg (25-35) 


 


Mean Corpuscular Hemoglobin


Concent 


 


 


 33 g/dL


(31-37)


 


Red Cell Distribution Width


 


 


 


 21.3 %


(11.5-14.5)


 


Platelet Count


 


 


 


 319 x10^3/uL


(140-400)


 


Neutrophils (%) (Auto)    62 % (31-73) 


 


Lymphocytes (%) (Auto)    26 % (24-48) 


 


Monocytes (%) (Auto)    9 % (0-9) 


 


Eosinophils (%) (Auto)    3 % (0-3) 


 


Basophils (%) (Auto)    0 % (0-3) 


 


Neutrophils # (Auto)


 


 


 


 3.2 x10^3uL


(1.8-7.7)


 


Lymphocytes # (Auto)


 


 


 


 1.3 x10^3/uL


(1.0-4.8)


 


Monocytes # (Auto)


 


 


 


 0.5 x10^3/uL


(0.0-1.1)


 


Eosinophils # (Auto)


 


 


 


 0.1 x10^3/uL


(0.0-0.7)


 


Basophils # (Auto)


 


 


 


 0.0 x10^3/uL


(0.0-0.2)


 


Test


 8/16/18


07:16 


 


 





 


Glucose (Fingerstick)


 111 mg/dL


(70-99) 


 


 











Microbiology


8/6/18 Blood Culture - Final, Complete


         NO GROWTH AFTER 5 DAYS


Medications





Current Medications


Sodium Chloride 1,000 ml @  125 mls/hr 1X  ONCE IV  Last administered on 8/3/

18at 19:50;  Start 8/3/18 at 15:45;  Stop 8/3/18 at 23:44;  Status DC


Ceftriaxone Sodium 1 gm/ Dextrose 50 ml @  100 mls/hr Q24H IV ;  Start 8/3/18 

at 15:45;  Status UNV


Ceftriaxone Sodium (Rocephin) 1 gm Q24H IVP  Last administered on 8/15/18at 17:

50;  Start 8/3/18 at 17:00


Acetaminophen (Tylenol) 1,000 mg BID PO  Last administered on 8/16/18at 08:13;  

Start 8/3/18 at 21:00


Alprazolam (Xanax) 0.25 mg PRN Q6HRS  PRN PO ANXIETY / AGITATION Last 

administered on 8/15/18at 20:11;  Start 8/3/18 at 18:45


Carvedilol (Coreg) 3.125 mg BIDWMEALS PO  Last administered on 8/16/18at 08:12;

  Start 8/3/18 at 21:00


Cholestyramine Resin (Questran Light) 4 gm DAILY PO  Last administered on 8/8/ 18at 08:57;  Start 8/4/18 at 09:00;  Stop 8/10/18 at 07:46;  Status DC


Citalopram Hydrobromide (CeleXA) 10 mg DAILY PO  Last administered on 8/16/18at 

08:12;  Start 8/4/18 at 09:00


Clopidogrel Bisulfate (Plavix) 75 mg DAILY PO  Last administered on 8/8/18at 08:

57;  Start 8/4/18 at 09:00;  Stop 8/9/18 at 15:20;  Status DC


Diphenhydramine HCl (Benadryl) 50 mg QHS PO  Last administered on 8/15/18at 20:

10;  Start 8/3/18 at 21:00


Docusate Sodium (Colace) 100 mg DAILY PO  Last administered on 8/16/18at 08:10;

  Start 8/4/18 at 09:00


Insulin Glargine (Lantus) 20 units DAILYWBKFT SQ  Last administered on 8/11/ 18at 08:55;  Start 8/4/18 at 08:00;  Stop 8/12/18 at 08:32;  Status DC


Isosorbide Mononitrate (Imdur) 30 mg DAILY PO  Last administered on 8/16/18at 08

:12;  Start 8/4/18 at 09:00


Lisinopril (Prinivil) 10 mg DAILY PO  Last administered on 8/16/18at 08:12;  

Start 8/4/18 at 09:00


Multivitamins/ Minerals (I-Josias) 1 tab BID PO  Last administered on 8/16/18at 08

:11;  Start 8/3/18 at 21:00


Non-Formulary Medication (Alendronate Sodium ) 70 mg WEEKLY PO ;  Start 8/10/18 

at 09:00;  Status UNV


Bumetanide (Bumex) 2 mg DAILY PO  Last administered on 8/16/18at 08:09;  Start 8 /4/18 at 09:00


Insulin Human Lispro (HumaLOG) 5 units TIDWMEALS SQ  Last administered on 8/11/ 18at 17:35;  Start 8/4/18 at 08:00;  Stop 8/12/18 at 08:32;  Status DC


Methotrexate (Rheumatrex) 15 mg WEEKLY PO  Last administered on 8/16/18at 08:21

;  Start 8/9/18 at 09:00


Fish Oil (Fish Oil) 1,000 mg DAILY PO  Last administered on 8/16/18at 08:13;  

Start 8/4/18 at 09:00


Pantoprazole Sodium (Protonix) 40 mg DAILYAC PO  Last administered on 8/16/18at 

08:10;  Start 8/4/18 at 07:30


Simvastatin (Zocor) 40 mg QHS PO  Last administered on 8/15/18at 20:11;  Start 8

/3/18 at 21:00


Warfarin Sodium (Coumadin) 7.5 mg DAILY16 PO ;  Start 8/4/18 at 16:00;  Stop 8/5 /18 at 11:25;  Status DC


Warfarin Sodium (Coumadin Per Physician) 1 each PRN DAILY  PRN MC SEE COMMENTS;

  Start 8/3/18 at 20:00;  Stop 8/9/18 at 15:20;  Status DC


Furosemide (Lasix) 40 mg 1X  ONCE IVP  Last administered on 8/5/18at 12:05;  

Start 8/5/18 at 12:00;  Stop 8/5/18 at 12:01;  Status DC


Magnesium Sulfate/ Dextrose 100 ml @  25 mls/hr 1X  ONCE IV  Last administered 

on 8/6/18at 12:16;  Start 8/6/18 at 13:00;  Stop 8/6/18 at 16:59;  Status DC


Potassium Chloride (Klor-Con) 40 meq 1X  ONCE PO  Last administered on 8/6/18at 

12:15;  Start 8/6/18 at 12:30;  Stop 8/6/18 at 12:31;  Status DC


Polyethylene Glycol (miraLAX Powder BULK BOTTLE) 238 gm 1X  ONCE PO  Last 

administered on 8/8/18at 13:55;  Start 8/8/18 at 13:00;  Stop 8/8/18 at 13:01;  

Status DC


Magnesium Citrate (Citroma) 296 ml 1X  ONCE PO  Last administered on 8/8/18at 12

:58;  Start 8/8/18 at 12:00;  Stop 8/8/18 at 12:01;  Status DC


Bisacodyl (Dulcolax Tab) 10 mg 1X  ONCE PO  Last administered on 8/8/18at 12:58

;  Start 8/8/18 at 12:30;  Stop 8/8/18 at 12:31;  Status DC


Bisacodyl (Dulcolax Tab) 10 mg 1X  ONCE PO  Last administered on 8/8/18at 13:55

;  Start 8/8/18 at 14:00;  Stop 8/8/18 at 14:01;  Status DC


Ringer's Solution 1,000 ml @  50 mls/hr Q20H IV ;  Start 8/9/18 at 07:00;  Stop 

8/9/18 at 18:59;  Status DC


Diphenhydramine HCl (Benadryl) 25 mg 1X  ONCE IVP  Last administered on 8/9/ 18at 09:30;  Start 8/9/18 at 09:30;  Stop 8/9/18 at 09:31;  Status DC


Lactobacillus Rhamnosus (Culturelle) 1 cap BID PO ;  Start 8/9/18 at 21:00;  

Status Cancel


Midazolam HCl (Versed) 2 mg PRN 1X  PRN IV PRIOR TO PROCEDURE;  Start 8/9/18 at 

13:00;  Stop 8/10/18 at 12:59;  Status DC


Fentanyl Citrate (Fentanyl 2ml Vial) 25 mcg PRN Q5MIN  PRN IV X 2 DOSES FOR PAIN

;  Start 8/9/18 at 13:00;  Stop 8/10/18 at 12:59;  Status DC


Fentanyl Citrate (Fentanyl 2ml Vial) 50 mcg PRN Q5MIN  PRN IV X 2 DOSES FOR PAIN

;  Start 8/9/18 at 13:00;  Stop 8/10/18 at 12:59;  Status DC


Ringer's Solution 1,000 ml @  125 mls/hr Q8H IV  Last administered on 8/9/18at 

13:12;  Start 8/9/18 at 12:54;  Stop 8/9/18 at 22:35;  Status DC


Lidocaine HCl (Xylocaine-Mpf 1% Vial) 2 ml 1X PRN  PRN ID IV START;  Start 8/9/ 18 at 13:00;  Stop 8/10/18 at 12:59;  Status DC


Aspirin (Ecotrin) 81 mg DAILYWBKFT PO  Last administered on 8/16/18at 08:10;  

Start 8/10/18 at 08:00


Iron Sucrose 500 mg/Sodium Chloride 275 ml @  78.571 mls/ hr 1X  ONCE IV  Last 

administered on 8/9/18at 17:30;  Start 8/9/18 at 17:30;  Stop 8/9/18 at 20:59;  

Status DC


Iohexol (Omnipaque 300 Mg/ml) 75 ml 1X  ONCE IV  Last administered on 8/10/18at 

06:15;  Start 8/10/18 at 06:15;  Stop 8/10/18 at 06:16;  Status DC


Iohexol (Omnipaque 240 Mg/ml) 50 ml 1X  ONCE PO  Last administered on 8/10/18at 

06:15;  Start 8/10/18 at 06:15;  Stop 8/10/18 at 06:16;  Status DC


Info (CONTRAST GIVEN -- Rx MONITORING) 1 each PRN DAILY  PRN MC SEE COMMENTS;  

Start 8/10/18 at 06:15;  Stop 8/12/18 at 06:14;  Status DC


Cholestyramine Resin (Questran Light) 4 gm DAILY PO ;  Start 8/10/18 at 13:00


Insulin Glargine (Lantus) 17 units DAILYWBKFT SQ  Last administered on 8/16/ 18at 08:21;  Start 8/13/18 at 08:00


Insulin Human Lispro (HumaLOG) 4 units TIDWMEALS SQ  Last administered on 8/16/ 18at 08:22;  Start 8/12/18 at 12:00


Acetaminophen (Tylenol) 650 mg PRN Q6HRS  PRN PO TEMP > 100.4F Last 

administered on 8/13/18at 08:54;  Start 8/12/18 at 16:15


Acetaminophen (Tylenol Supp) 650 mg PRN Q4HRS  PRN LA TEMP > 100.4F;  Start 8/12 /18 at 16:15


Heparin Sodium/ Dextrose 500 ml @ 0 mls/hr CONT  PRN IV SEE I/O RECORD;  Start 8 /12/18 at 16:15;  Status UNV


Heparin Sodium/ Dextrose 500 ml @ 0 mls/hr CONT  PRN IV SEE I/O RECORD Last 

administered on 8/15/18at 11:37;  Start 8/12/18 at 16:30;  Stop 8/15/18 at 16:49

;  Status DC


Heparin Sodium (Porcine) (Heparin Sodium) 2,600 unit PRN Q6HRS  PRN IV FOR UFH 

LEVEL LESS THAN 0.2 Last administered on 8/13/18at 03:31;  Start 8/12/18 at 16:

30;  Stop 8/15/18 at 16:49;  Status DC


Info (Anti-Coagulation Monitoring By Pharmacy) 1 each PRN DAILY  PRN MC SEE 

COMMENTS Last administered on 8/15/18at 13:57;  Start 8/12/18 at 16:45


Gadobutrol (Gadavist) 10 mmol 1X  ONCE IV ;  Start 8/13/18 at 13:45;  Stop 8/13/ 18 at 13:46;  Status DC


Propofol 20 ml @ As Directed STK-MED ONCE IV ;  Start 8/9/18 at 13:08;  Stop 8/ 13/18 at 16:42;  Status DC


Lidocaine HCl (Lidocaine Pf 2% Vial) 5 ml STK-MED ONCE .ROUTE ;  Start 8/9/18 

at 13:20;  Stop 8/13/18 at 16:42;  Status DC


Nystatin (Nystop) 1 fatemeh BID TP  Last administered on 8/16/18at 08:14;  Start 8/ 14/18 at 21:00


Gadobutrol (Gadavist) 9 mmol 1X  ONCE IV  Last administered on 8/14/18at 11:29;

  Start 8/14/18 at 11:00;  Stop 8/14/18 at 11:01;  Status DC





Active Scripts


Active


Reported


Carvedilol 3.125 Mg Tablet 3.125 Mg PO BID


Methotrexate (Methotrexate Sodium) 2.5 Mg Tablet 6 Tab PO WEEKLY


Benadryl (Diphenhydramine Hcl) 25 Mg Capsule 2 Cap PO QHS


Bumetanide 2 Mg Tablet 2 Mg PO DAILY


Ocuvite Tablet (Vit A,C & E/Lutein/Minerals) 1 Each Tablet 1 Each PO BID


Prevalite Packet (Cholestyramine/Aspartame) 4 Gm Powd.pack 4 Gm PO 


Citalopram Hbr (Citalopram Hydrobromide) 10 Mg Tablet 10 Mg PO DAILY


Clopidogrel (Clopidogrel Bisulfate) 75 Mg Tablet 75 Mg PO DAILY


Alprazolam 0.25 Mg Tablet 0.25 Mg PO PRN Q6HRS PRN


Isosorbide Mononitrate Er (Isosorbide Mononitrate) 30 Mg Tab.er.24h 30 Mg PO 

DAILY


Prilosec Otc (Omeprazole Magnesium) 20 Mg Tablet.dr 20 Mg PO DAILY


Novolog Flexpen (Insulin Aspart) 100 Unit/1 Ml Insuln.pen 5 Unit SQ TIDBFRMEAL


Lantus Solostar (Insulin Glargine,Hum.rec.anlog) 100 Unit/1 Ml Insuln.pen 20 

Unit SQ DAILYWBKFT


Acetaminophen 500 Mg Tablet 2 Tab PO BID


Vitamin D-3 (Cholecalciferol (Vitamin D3)) 2,000 Unit Capsule 1,000 Unit PO 


Alendronate Sodium 70 Mg Tablet 70 Mg PO WEEKLY


Warfarin Sodium 5 Mg Tablet 1.5 Tab PO DAILY


Lisinopril 10 Mg Tablet 10 Mg PO DAILY


Simvastatin 40 Mg Tablet 40 Mg PO DAILY


Colace (Docusate Sodium) 100 Mg Capsule 100 Mg PO 


Garlic 1,000 Mg Capsule 1,000 Mg PO 


Multivitamins (Multivitamin) 1 Each Capsule 1 Each PO 


[place] 3 Fish Oil Softgel (Omega-3 Fatty Acids/Fish Oil) 1 Each Capsule. 1 

Each PO DAILY


Vitals/I & O





Vital Sign - Last 24 Hours








 8/15/18 8/15/18 8/15/18 8/15/18





 09:32 09:32 09:33 11:00


 


Temp    96.3





    96.3


 


Pulse 84 84 84 78


 


Resp    16


 


B/P (MAP) 154/54 154/54 154/54 144/46 (78)


 


Pulse Ox    95


 


O2 Delivery    Room Air


 


    





    





 8/15/18 8/15/18 8/15/18 8/15/18





 15:00 17:50 19:05 20:00


 


Temp 97.7  98.1 





 97.7  98.1 


 


Pulse 86 78 83 


 


Resp 24  16 


 


B/P (MAP) 125/38 (67) 128/45 127/35 (65) 


 


Pulse Ox 95  92 


 


O2 Delivery Room Air  Room Air Room Air


 


O2 Flow Rate    2.0


 


    





    





 8/15/18 8/16/18 8/16/18 8/16/18





 23:38 03:11 07:00 08:12


 


Temp 98.8 97.6 96.1 





 98.8 97.6 96.1 


 


Pulse 92 82 90 90


 


Resp 16 18 20 


 


B/P (MAP) 126/43 (70) 109/47 (67) 159/64 (95) 159/64


 


Pulse Ox 93 95 95 


 


O2 Delivery Room Air Room Air Room Air 


 


    





    





 8/16/18 8/16/18  





 08:12 08:12  


 


Pulse 90 90  


 


B/P (MAP) 159/64 159/64  














Intake and Output   


 


 8/15/18 8/15/18 8/16/18





 15:00 23:00 07:00


 


Intake Total 360 ml 630 ml 500 ml


 


Balance 360 ml 630 ml 500 ml

















HERBERTH STEWART MD Aug 16, 2018 08:47

## 2018-08-16 NOTE — PDOC
Provider Note


Provider Note


No new cardiac recs. 


upon DC would set up appt with her primary cardiologist at  for consideration 

of watchman device to help avoid anticoagulation. 


Would be happy to help facilitate for patient. thanks











TAMIA MARAVILLA MD Aug 16, 2018 22:53

## 2018-08-16 NOTE — PDOC
PROGRESS NOTES


Assessment


Assessment


Left side heaviness/weakness.


Falls.


Acute small right parietal lobe, left frontal and parieto-occipital lobe 

infarcts.


Multiple mediastinal lymph nodes, metastatic ?


Multiple lung nodules.


Chest pain.


AFib.


DM.


Respiratory distress.


Right side breast cancer s/p right mastectomy and left lumpectomy.


CAD/sp stents placement.


CHF, EF 30-35%.


Pulmonary hypertension, PA pressure 53.


Anemia.


Obesity.


Esophageal cancer, adenocarcinoma.


No brain metastatic disease this time.





RECOMMENDATIONS/PLAN:


She has been on Heparin and ASA 81 mg daily.


Continue Zocor 40 mg HS.


Treat medical and cardiac diseases.


OT/PT.





Echo on 8/6/18: see above reports.


Lipids: WNL.





HISTORY OF THE PRESENT ILLNESS:


82-y-old  female patient with above medical and oncological diseases 

was admitted on 8/3/18 due to chest pain, SOB and other complaints. She was 

noted left side weakness and heaviness, so Neurology was required for 

consultation. 





PAST MEDICAL HISTORY


Cardiovascular:  CAD, HTN, Hyperlipidemia


CENTRAL NERVOUS SYSTEM:  CVA, TIA


GI:  GERD


Heme/Onc:  Cancer (breast)


Musculoskeletal:  Osteoarthritis


Rheumatologic:  Rheumatoid arthritis


Endocrine:  Diabetes





PAST SURGICAL HISTORY


CABG (details unknown ), Total knee replacement (bilateral )





FAMILY HISTORY


Family History Unknown





SOCIAL HISTORY


Smoke:  Quit


ALCOHOL:  other (daily beer )


Drugs:  None





ALLERGIES


Milk (Verified  Allergy, Intermediate, 8/3/18)


Montelukast (Verified  Allergy, Intermediate, 8/3/18)





MEDICATIONS:


Refer to MAR





REVIEW OF SYSTEMS:


Constitutional: Obesity.


Head: No traumatic brain or head injury.


Skin: No edema, or rash.


Ear: No infection.


Eyes: No vision loss or color blindness.


Nose: No bleeding or purulent discharges.


Hearing: No hearing decrease.


Neck: No injury.


Breast: Cancer


Cardiac: CAD, AFib, s/p CABG, AFib, HTN, HLD.


Pulmonary: No COPD.


GI: No GI ulcer, GI bleeding.


Urinary/genital: UTI.


Endocrinologic: DM. Obesity.


Skeletomuscular: No muscular atrophy, deformity


Neurological: see  HP.


Psychiatric: Denies drug use/abuse.


Otherwise, not pertinant14-point review of systems.





PHYSICAL EXAMINATION:   


General appearance is in subacute distress.  


HEENT:  Normocephalic and nontraumatic.  Eyes, nose, ears, and throat are 

unremarkable.  


Neck is supple. No lymphadenopathy. No bruits are heard over the carotid 

artery.  No crepitus. 


Cardiovascular:  S1, S2, irregular rate and rhythm.  


Pulmonary:  Clear to auscultation bilaterally. 


Abdomen:  Bowel sounds are positive.  Abdomen is soft, nontender, and 

nondistended.    


Extremities:  No rash, lesions, or edema.  


No restriction of range of motion





NEUROLOGICAL  EXAMINATION:


Awake.


Oriented to time, place and person, but reaction.


PERRL.


EOMI.


CN: no focal findings.


Muscle tone: within normal.


Muscle strength: 4+


DTR: 2-


Plantar reflex: Neutral response bilaterally 


Gait: Able to walk with a walker with assistance.


Sensory exam: no abnormal findings.


No cerebellar signs elicited.


F-T-N test fine.





Objective


Objective





Vital Signs








  Date Time  Temp Pulse Resp B/P (MAP) Pulse Ox O2 Delivery O2 Flow Rate FiO2


 


8/16/18 15:00 97.7 81 24 123/43 (69) 94 Room Air  





 97.7       


 


8/16/18 08:00       2.0 














Intake and Output 


 


 8/16/18





 07:00


 


Intake Total 1490 ml


 


Balance 1490 ml


 


 


 


Intake Oral 1490 ml


 


# Voids 7


 


# Bowel Movements 1











Vitals Signs


Vitals





 VS - Last 72 Hours, by Label








  Date Time  Temp Pulse Resp B/P (MAP) Pulse Ox O2 Delivery O2 Flow Rate FiO2


 


8/16/18 15:00 97.7 81 24 123/43 (69) 94 Room Air  





 97.7       


 


8/16/18 11:00 97.5 82 16 128/41 (70) 96 Room Air  





 97.5       


 


8/16/18 08:12  90  159/64    


 


8/16/18 08:12  90  159/64    


 


8/16/18 08:12  90  159/64    


 


8/16/18 08:00      Room Air 2.0 


 


8/16/18 07:00 96.1 90 20 159/64 (95) 95 Room Air  





 96.1       


 


8/16/18 03:11 97.6 82 18 109/47 (67) 95 Room Air  





 97.6       


 


8/15/18 23:38 98.8 92 16 126/43 (70) 93 Room Air  





 98.8       


 


8/15/18 20:00      Room Air 2.0 


 


8/15/18 19:05 98.1 83 16 127/35 (65) 92 Room Air  





 98.1       


 


8/15/18 17:50  78  128/45    


 


8/15/18 15:00 97.7 86 24 125/38 (67) 95 Room Air  





 97.7       


 


8/15/18 11:00 96.3 78 16 144/46 (78) 95 Room Air  





 96.3       


 


8/15/18 09:33  84  154/54    


 


8/15/18 09:32  84  154/54    


 


8/15/18 09:32  84  154/54    


 


8/15/18 08:00      Room Air  


 


8/15/18 07:00 97.7 84 24 154/54 (87) 94 Room Air  





 97.7       











Laboratory


Laboratory





Laboratory Tests








Test


 8/15/18


21:20 8/16/18


03:35 8/16/18


07:16 8/16/18


11:48


 


Glucose (Fingerstick)


 180 mg/dL


(70-99) 


 111 mg/dL


(70-99) 124 mg/dL


(70-99)


 


White Blood Count


 


 5.2 x10^3/uL


(4.0-11.0) 


 





 


Red Blood Count


 


 2.89 x10^6/uL


(3.50-5.40) 


 





 


Hemoglobin


 


 8.2 g/dL


(12.0-15.5) 


 





 


Hematocrit


 


 24.5 %


(36.0-47.0) 


 





 


Mean Corpuscular Volume  85 fL ()   


 


Mean Corpuscular Hemoglobin  28 pg (25-35)   


 


Mean Corpuscular Hemoglobin


Concent 


 33 g/dL


(31-37) 


 





 


Red Cell Distribution Width


 


 21.3 %


(11.5-14.5) 


 





 


Platelet Count


 


 319 x10^3/uL


(140-400) 


 





 


Neutrophils (%) (Auto)  62 % (31-73)   


 


Lymphocytes (%) (Auto)  26 % (24-48)   


 


Monocytes (%) (Auto)  9 % (0-9)   


 


Eosinophils (%) (Auto)  3 % (0-3)   


 


Basophils (%) (Auto)  0 % (0-3)   


 


Neutrophils # (Auto)


 


 3.2 x10^3uL


(1.8-7.7) 


 





 


Lymphocytes # (Auto)


 


 1.3 x10^3/uL


(1.0-4.8) 


 





 


Monocytes # (Auto)


 


 0.5 x10^3/uL


(0.0-1.1) 


 





 


Eosinophils # (Auto)


 


 0.1 x10^3/uL


(0.0-0.7) 


 





 


Basophils # (Auto)


 


 0.0 x10^3/uL


(0.0-0.2) 


 





 


Test


 8/16/18


16:39 


 


 





 


Glucose (Fingerstick)


 111 mg/dL


(70-99) 


 


 











Microbiology


8/6/18 Blood Culture - Final, Complete


         NO GROWTH AFTER 5 DAYS





Medication


Medications





Current Medications


Rivaroxaban (Xarelto) 10 mg DAILYWSUP PO ;  Start 8/16/18 at 17:00;  Stop 8/16/ 18 at 17:00;  Status DC


Rivaroxaban (Xarelto) 15 mg DAILYWSUP PO ;  Start 8/16/18 at 17:00





Comment


Review of Relevant


I have reviewed the following items claudio (where applicable) has been applied.











ADAN EVANS MD Aug 16, 2018 17:21

## 2018-08-16 NOTE — PDOC
Objective:


Objective:


Reviewed chart.


Has Xarelto ordered, accepted at [facility].


Vital Signs:





 Vital Signs








  Date Time  Temp Pulse Resp B/P (MAP) Pulse Ox O2 Delivery O2 Flow Rate FiO2


 


8/16/18 11:00 97.5 82 16 128/41 (70) 96 Room Air  





 97.5       


 


8/15/18 20:00       2.0 








Labs:





Laboratory Tests








Test


 8/15/18


16:43 8/15/18


21:20 8/16/18


03:35 8/16/18


07:16


 


Glucose (Fingerstick) 105 mg/dL  180 mg/dL   111 mg/dL 


 


White Blood Count   5.2 x10^3/uL  


 


Red Blood Count   2.89 x10^6/uL  


 


Hemoglobin   8.2 g/dL  


 


Hematocrit   24.5 %  


 


Mean Corpuscular Volume   85 fL  


 


Mean Corpuscular Hemoglobin   28 pg  


 


Mean Corpuscular Hemoglobin


Concent 


 


 33 g/dL 


 





 


Red Cell Distribution Width   21.3 %  


 


Platelet Count   319 x10^3/uL  


 


Neutrophils (%) (Auto)   62 %  


 


Lymphocytes (%) (Auto)   26 %  


 


Monocytes (%) (Auto)   9 %  


 


Eosinophils (%) (Auto)   3 %  


 


Basophils (%) (Auto)   0 %  


 


Neutrophils # (Auto)   3.2 x10^3uL  


 


Lymphocytes # (Auto)   1.3 x10^3/uL  


 


Monocytes # (Auto)   0.5 x10^3/uL  


 


Eosinophils # (Auto)   0.1 x10^3/uL  


 


Basophils # (Auto)   0.0 x10^3/uL  











PE:





GEN: NAD


LUNGS: room air


NEURO/PSYCH: asleep in reclined w/ washcloth over eyes





A/P:


Esophageal adenocarcinoma





--


Continue same per GI.











JOSE FORUNIER Aug 16, 2018 12:00

## 2018-08-16 NOTE — PN
DATE:  08/16/2018



LOCATION:  Room 648.



SUBJECTIVE:  The patient is awake, alert, voices no significant complaints this

morning, shortness of breath remains better.



OBJECTIVE:

VITAL SIGNS:  Stable.  She is afebrile.  Sugars are good.  Hemoglobin is 8.2. 

She has a negative 500 mL fluid balance.

CHEST:  Clear.

HEART:  Irregular, controlled rate.

ABDOMEN:  Benign.

EXTREMITIES:  Without cyanosis, clubbing, or significant edema.

NEUROLOGIC:  She seems nonfocal.



Heparin has been stopped for the patient this morning, but no other

anticoagulant has been added, and she is going to be on another anticoagulant

prior with what she has done here with the stroke, we are holding the Coumadin

for the GI evaluation.



IMPRESSION:

1.  Embolic cerebrovascular accident.

2.  Poorly differentiated adenocarcinoma of the esophagus.

3.  Atrial fibrillation.

4.  Cardiomyopathy.

5.  Diabetes.

6.  Coagulopathy, resolved.

7.  Congestive heart failure, stable.



PLAN:  At this point, she is considering skilled nursing at [facility].  I

am going to start her on one of the newer anticoagulants with likely discharge

in the morning to [facility] if all is stable.

 



______________________________

MARSHALL RICHARDS MD



DR:  [email or web address]/tu  JOB#:  [id number] / [id number]

DD:  08/16/2018 09:05  DT:  08/16/2018 13:43

## 2018-08-17 VITALS
SYSTOLIC BLOOD PRESSURE: 138 MMHG | SYSTOLIC BLOOD PRESSURE: 138 MMHG | DIASTOLIC BLOOD PRESSURE: 49 MMHG | DIASTOLIC BLOOD PRESSURE: 49 MMHG

## 2018-08-17 VITALS — DIASTOLIC BLOOD PRESSURE: 39 MMHG | SYSTOLIC BLOOD PRESSURE: 126 MMHG

## 2018-08-17 VITALS — DIASTOLIC BLOOD PRESSURE: 65 MMHG | SYSTOLIC BLOOD PRESSURE: 117 MMHG

## 2018-08-17 RX ADMIN — ISOSORBIDE MONONITRATE SCH MG: 30 TABLET, EXTENDED RELEASE ORAL at 07:53

## 2018-08-17 RX ADMIN — ASPIRIN SCH MG: 81 TABLET, COATED ORAL at 07:53

## 2018-08-17 RX ADMIN — DOCUSATE SODIUM SCH MG: 100 CAPSULE, LIQUID FILLED ORAL at 07:52

## 2018-08-17 RX ADMIN — LISINOPRIL SCH MG: 10 TABLET ORAL at 07:54

## 2018-08-17 RX ADMIN — ACETAMINOPHEN SCH MG: 500 TABLET ORAL at 07:53

## 2018-08-17 RX ADMIN — BUMETANIDE SCH MG: 1 TABLET ORAL at 07:54

## 2018-08-17 RX ADMIN — INSULIN GLARGINE SCH UNITS: 100 INJECTION, SOLUTION SUBCUTANEOUS at 08:10

## 2018-08-17 RX ADMIN — CARVEDILOL SCH MG: 3.12 TABLET, FILM COATED ORAL at 07:54

## 2018-08-17 RX ADMIN — CHOLESTYRAMINE SCH GM: 4 POWDER, FOR SUSPENSION ORAL at 07:54

## 2018-08-17 RX ADMIN — INSULIN LISPRO SCH UNITS: 100 INJECTION, SOLUTION INTRAVENOUS; SUBCUTANEOUS at 08:10

## 2018-08-17 RX ADMIN — Medication SCH MG: at 07:52

## 2018-08-17 RX ADMIN — PANTOPRAZOLE SODIUM SCH MG: 40 TABLET, DELAYED RELEASE ORAL at 07:52

## 2018-08-17 RX ADMIN — CITALOPRAM HYDROBROMIDE SCH MG: 10 TABLET ORAL at 07:53

## 2018-08-17 NOTE — PDOC
GENERAL


General:


see dictated discharge summary.





VITAL SIGNS


Vital Signs:





Vital Signs








  Date Time  Temp Pulse Resp B/P (MAP) Pulse Ox O2 Delivery O2 Flow Rate FiO2


 


8/17/18 08:00      Room Air 2.0 


 


8/17/18 07:54  60  117/65    


 


8/17/18 06:55 97.4  18  92   





 97.4       











I & O


I & O











Intake and Output 


 


 8/17/18





 07:00


 


Intake Total 900 ml


 


Balance 900 ml


 


 


 


Intake Oral 900 ml


 


# Voids 4


 


# Bowel Movements 1











ALLERGIES


Allergies:





Allergies








Coded Allergies Type Severity Reaction Last Updated Verified


 


  milk Allergy Intermediate  8/9/18 Yes


 


  montelukast Allergy Intermediate  8/9/18 Yes











MEDS


Medications:





Current Medications








 Medications


  (Trade)  Dose


 Ordered  Sig/Gerry  Start Time


 Stop Time Status Last Admin


Dose Admin


 


 Acetaminophen


  (Tylenol Supp)  650 mg  PRN Q4HRS  PRN  8/12/18 16:15


     





 


 Acetaminophen


  (Tylenol)  650 mg  PRN Q6HRS  PRN  8/12/18 16:15


    8/13/18 08:54


650 MG


 


 Alprazolam


  (Xanax)  0.25 mg  PRN Q6HRS  PRN  8/3/18 18:45


    8/15/18 20:11


0.25 MG


 


 Aspirin


  (Ecotrin)  81 mg  DAILYWBKFT  8/10/18 08:00


    8/17/18 07:53


81 MG


 


 Bisacodyl


  (Dulcolax Tab)  10 mg  1X  ONCE  8/8/18 14:00


 8/8/18 14:01 DC 8/8/18 13:55


10 MG


 


 Bumetanide


  (Bumex)  2 mg  DAILY  8/4/18 09:00


    8/17/18 07:54


2 MG


 


 Carvedilol


  (Coreg)  3.125 mg  BIDWMEALS  8/3/18 21:00


    8/17/18 07:54


3.125 MG


 


 Ceftriaxone


 Sodium 1 gm/


 Dextrose  50 ml @ 


 100 mls/hr  Q24H  8/3/18 15:45


   UNV  





 


 Ceftriaxone Sodium


  (Rocephin)  1 gm  Q24H  8/3/18 17:00


    8/16/18 18:04


1 GM


 


 Cholestyramine


 Resin


  (Questran Light)  4 gm  DAILY  8/10/18 13:00


     





 


 Citalopram


 Hydrobromide


  (CeleXA)  10 mg  DAILY  8/4/18 09:00


    8/17/18 07:53


10 MG


 


 Clopidogrel


 Bisulfate


  (Plavix)  75 mg  DAILY  8/4/18 09:00


 8/9/18 15:20 DC 8/8/18 08:57


75 MG


 


 Diphenhydramine


 HCl


  (Benadryl)  25 mg  1X  ONCE  8/9/18 09:30


 8/9/18 09:31 DC 8/9/18 09:30


25 MG


 


 Docusate Sodium


  (Colace)  100 mg  DAILY  8/4/18 09:00


    8/17/18 07:52


100 MG


 


 Fentanyl Citrate


  (Fentanyl 2ml


 Vial)  50 mcg  PRN Q5MIN  PRN  8/9/18 13:00


 8/10/18 12:59 DC  





 


 Fish Oil


  (Fish Oil)  1,000 mg  DAILY  8/4/18 09:00


    8/17/18 07:52


1,000 MG


 


 Furosemide


  (Lasix)  40 mg  1X  ONCE  8/5/18 12:00


 8/5/18 12:01 DC 8/5/18 12:05


40 MG


 


 Gadobutrol


  (Gadavist)  9 mmol  1X  ONCE  8/14/18 11:00


 8/14/18 11:01 DC 8/14/18 11:29


9 MMOL


 


 Heparin Sodium


  (Porcine)


  (Heparin Sodium)  2,600 unit  PRN Q6HRS  PRN  8/12/18 16:30


 8/15/18 16:49 DC 8/13/18 03:31


2,600 UNIT


 


 Heparin Sodium/


 Dextrose  500 ml @ 0


 mls/hr  CONT  PRN  8/12/18 16:30


 8/15/18 16:49 DC 8/15/18 11:37


31.5 MLS/HR


 


 Info


  (Anti-Coagulation


 Monitoring By


 Pharmacy)  1 each  PRN DAILY  PRN  8/12/18 16:45


    8/16/18 14:52


1 EACH


 


 Info


  (CONTRAST GIVEN


 -- Rx MONITORING)  1 each  PRN DAILY  PRN  8/10/18 06:15


 8/12/18 06:14 DC  





 


 Insulin Glargine


  (Lantus)  17 units  DAILYWBKFT  8/13/18 08:00


    8/17/18 08:10


17 UNITS


 


 Insulin Human


 Lispro


  (HumaLOG)  4 units  TIDWMEALS  8/12/18 12:00


    8/17/18 08:10


4 UNITS


 


 Iohexol


  (Omnipaque 240


 Mg/ml)  50 ml  1X  ONCE  8/10/18 06:15


 8/10/18 06:16 DC 8/10/18 06:15


50 ML


 


 Iohexol


  (Omnipaque 300


 Mg/ml)  75 ml  1X  ONCE  8/10/18 06:15


 8/10/18 06:16 DC 8/10/18 06:15


75 ML


 


 Iron Sucrose 500


 mg/Sodium Chloride  275 ml @ 


 78.571 mls/


 hr  1X  ONCE  8/9/18 17:30


 8/9/18 20:59 DC 8/9/18 17:30


78.571 MLS/HR


 


 Isosorbide


 Mononitrate


  (Imdur)  30 mg  DAILY  8/4/18 09:00


    8/17/18 07:53


30 MG


 


 Lactobacillus


 Rhamnosus


  (Culturelle)  1 cap  BID  8/9/18 21:00


   Cancel  





 


 Lidocaine HCl


  (Lidocaine Pf 2%


 Vial)  5 ml  STK-MED ONCE  8/9/18 13:20


 8/13/18 16:42 DC  





 


 Lidocaine HCl


  (Xylocaine-Mpf


 1% Vial)  2 ml  1X PRN  PRN  8/9/18 13:00


 8/10/18 12:59 DC  





 


 Lisinopril


  (Prinivil)  10 mg  DAILY  8/4/18 09:00


    8/17/18 07:54


10 MG


 


 Magnesium Citrate


  (Citroma)  296 ml  1X  ONCE  8/8/18 12:00


 8/8/18 12:01 DC 8/8/18 12:58


296 ML


 


 Magnesium Sulfate/


 Dextrose  100 ml @ 


 25 mls/hr  1X  ONCE  8/6/18 13:00


 8/6/18 16:59 DC 8/6/18 12:16


25 MLS/HR


 


 Methotrexate


  (Rheumatrex)  15 mg  WEEKLY  8/9/18 09:00


    8/16/18 08:21


15 MG


 


 Midazolam HCl


  (Versed)  2 mg  PRN 1X  PRN  8/9/18 13:00


 8/10/18 12:59 DC  





 


 Multivitamins/


 Minerals


  (I-Josias)  1 tab  BID  8/3/18 21:00


    8/16/18 20:48


1 TAB


 


 Non-Formulary


 Medication


  (Alendronate


 Sodium )  70 mg  WEEKLY  8/10/18 09:00


   UNV  





 


 Nystatin


  (Nystop)  1 fatemeh  BID  8/14/18 21:00


    8/16/18 20:50


1 FATEMEH


 


 Pantoprazole


 Sodium


  (Protonix)  40 mg  DAILYAC  8/4/18 07:30


    8/17/18 07:52


40 MG


 


 Polyethylene


 Glycol


  (miraLAX Powder


 BULK BOTTLE)  238 gm  1X  ONCE  8/8/18 13:00


 8/8/18 13:01 DC 8/8/18 13:55


238 GM


 


 Potassium Chloride


  (Klor-Con)  40 meq  1X  ONCE  8/6/18 12:30


 8/6/18 12:31 DC 8/6/18 12:15


40 MEQ


 


 Propofol  20 ml @ As


 Directed  STK-MED ONCE  8/9/18 13:08


 8/13/18 16:42 DC  





 


 Ringer's Solution  1,000 ml @ 


 125 mls/hr  Q8H  8/9/18 12:54


 8/9/18 22:35 DC 8/9/18 13:12


125 MLS/HR


 


 Rivaroxaban


  (Xarelto)  15 mg  DAILYWSUP  8/16/18 17:00


    8/16/18 18:04


15 MG


 


 Simvastatin


  (Zocor)  40 mg  QHS  8/3/18 21:00


    8/16/18 20:48


40 MG


 


 Sodium Chloride  1,000 ml @ 


 125 mls/hr  1X  ONCE  8/3/18 15:45


 8/3/18 23:44 DC 8/3/18 19:50


125 MLS/HR


 


 Warfarin Sodium


  (Coumadin Per


 Physician)  1 each  PRN DAILY  PRN  8/3/18 20:00


 8/9/18 15:20 DC  





 


 Warfarin Sodium


  (Coumadin)  7.5 mg  DAILY16  8/4/18 16:00


 8/5/18 11:25 DC  














LAB


Lab:





Laboratory Tests








Test


 8/16/18


11:48 8/16/18


16:39 8/16/18


20:29 8/17/18


07:12


 


Glucose (Fingerstick)


 124 mg/dL


(70-99) 111 mg/dL


(70-99) 143 mg/dL


(70-99) 125 mg/dL


(70-99)

















MARSHALL RICHARDS MD Aug 17, 2018 08:29

## 2018-08-17 NOTE — PDOC
Provider Note


Provider Note


83 yo woman with bx proven adenocarcinoma of distal esophagus found during 

assessment of anemia.





Had acute CVA while hospitalized.  MRI  revealed small infarcts in right 

parietal, left frontal and left parietal


occipital lobes.  Now on anticoagulation.





Staging CT scan did reveal thickening of distal esophagus and para-esophageal 

and mediastinal


adenopathy possibly reactive vs metastatic in nature.  Tiny lung nodules in 

apical RUL.





Now feeling better overall.  No neuro sxs.  Able to eat solid foods better and 

no longer requiring fluids to wash solid 


food down.  Good appetite.  No abdominal or chest pain, nausea or vomiting.





Impression:  Distal esophageal adenocarcinoma.  Multiple co-morbidities.





Plan on discharge now to [facility] for rehab.  





Plan on outpatient PET CT to complete staging.  If she is doing well in rehab, 

will pursue this nest week


or the week after with follow-up to discuss results.





Dr. Champion will see patient in follow-up in two weeks.





Thorough discussion with patient.











DIANA EUGENE MD Aug 17, 2018 09:14

## 2018-08-17 NOTE — PDOC
PROGRESS NOTES


Subjective


Subjective


HPI - f/u of Poorly differentiated Esophageal adenocarcinoma diagnosed 8/9/18 





ROS - no CP, no dysphagia





Objective


Objective





Vital Signs








  Date Time  Temp Pulse Resp B/P (MAP) Pulse Ox O2 Delivery O2 Flow Rate FiO2


 


8/17/18 08:00      Room Air 2.0 


 


8/17/18 07:54  60  117/65    


 


8/17/18 06:55 97.4  18  92   





 97.4       














Intake and Output 


 


 8/17/18





 07:00


 


Intake Total 900 ml


 


Balance 900 ml


 


 


 


Intake Oral 900 ml


 


# Voids 4


 


# Bowel Movements 1











Physical Exam


Heart:  Normal S1, Normal S2


General:  Alert, Oriented X3, No acute distress


Lungs:  Clear to auscultation


Neuro:  Normal speech


Psych/Mental Status:  Mental status NL





Assessment


Assessment


IMPRESSION AND PLAN:





1.  Poorly differentiated Esophageal adenocarcinoma diagnosed 8/9/18 - nodules 

noted on EGD


done on 08/09/2018.  history of Perla esophagus.


CT chest, abdomen and pelvis 8/10/18 Multiple mediastinal lymph nodes 

identified and few paraesophageal 


lymph nodes could be reactive or metastatic. 





Plan PET CT scan for 


further evaluation (outpatient). Scattered multiple lung nodules with the 

largest measuring 5.5 mm in 


the right apical lung.


Bone scan 8/10/18 is neg.





Considering her age and comorbidities, treatment options would be


definitive radiation therapy versus combined chemoradiation.  I also


d/w DR Robertson, Radiation Oncology and Dr Zimmer.  





f/u with me in 2 weeks.





2.  Anemia.  Iron deficiency is noted with very decreased iron saturation, but


the ferritin is normal. s/p Venofer 500 mg IV x 1 dose 8/9/18.


Hb 8.2 on 8/16/18, monitor cbc





3.  Coronary artery disease.  Her ejection fraction on echocardiogram done on


08/06/2018 was only 30-35%.  Appreciate Cardiology management.





4. Acute CVA - Falls - CT head neg. MRI brain 8/13/18: Small areas of acute 

ischemia/infarction are seen involving 


the right parietal lobe, the medial left frontal lobe and the left 


parietal occipital lobe. There is no significant surrounding edema or 

associated mass effect. 


Left side heaviness/weakness.


Management per neurology.





5. Rheumatoid arthritis - on Methotrexate weekly.





6. A.fib - she is on anticoagulation. I d/w cardiology





Comment


Review of Relevant


I have reviewed the following items claudio (where applicable) has been applied.


Labs





Laboratory Tests








Test


 8/15/18


11:41 8/15/18


16:43 8/15/18


21:20 8/16/18


03:35


 


Glucose (Fingerstick)


 149 mg/dL


(70-99) 105 mg/dL


(70-99) 180 mg/dL


(70-99) 





 


White Blood Count


 


 


 


 5.2 x10^3/uL


(4.0-11.0)


 


Red Blood Count


 


 


 


 2.89 x10^6/uL


(3.50-5.40)


 


Hemoglobin


 


 


 


 8.2 g/dL


(12.0-15.5)


 


Hematocrit


 


 


 


 24.5 %


(36.0-47.0)


 


Mean Corpuscular Volume    85 fL () 


 


Mean Corpuscular Hemoglobin    28 pg (25-35) 


 


Mean Corpuscular Hemoglobin


Concent 


 


 


 33 g/dL


(31-37)


 


Red Cell Distribution Width


 


 


 


 21.3 %


(11.5-14.5)


 


Platelet Count


 


 


 


 319 x10^3/uL


(140-400)


 


Neutrophils (%) (Auto)    62 % (31-73) 


 


Lymphocytes (%) (Auto)    26 % (24-48) 


 


Monocytes (%) (Auto)    9 % (0-9) 


 


Eosinophils (%) (Auto)    3 % (0-3) 


 


Basophils (%) (Auto)    0 % (0-3) 


 


Neutrophils # (Auto)


 


 


 


 3.2 x10^3uL


(1.8-7.7)


 


Lymphocytes # (Auto)


 


 


 


 1.3 x10^3/uL


(1.0-4.8)


 


Monocytes # (Auto)


 


 


 


 0.5 x10^3/uL


(0.0-1.1)


 


Eosinophils # (Auto)


 


 


 


 0.1 x10^3/uL


(0.0-0.7)


 


Basophils # (Auto)


 


 


 


 0.0 x10^3/uL


(0.0-0.2)


 


Test


 8/16/18


07:16 8/16/18


11:48 8/16/18


16:39 8/16/18


20:29


 


Glucose (Fingerstick)


 111 mg/dL


(70-99) 124 mg/dL


(70-99) 111 mg/dL


(70-99) 143 mg/dL


(70-99)


 


Test


 8/17/18


07:12 


 


 





 


Glucose (Fingerstick)


 125 mg/dL


(70-99) 


 


 











Laboratory Tests








Test


 8/16/18


11:48 8/16/18


16:39 8/16/18


20:29 8/17/18


07:12


 


Glucose (Fingerstick)


 124 mg/dL


(70-99) 111 mg/dL


(70-99) 143 mg/dL


(70-99) 125 mg/dL


(70-99)








Microbiology


8/6/18 Blood Culture - Final, Complete


         NO GROWTH AFTER 5 DAYS


Medications





Current Medications


Sodium Chloride 1,000 ml @  125 mls/hr 1X  ONCE IV  Last administered on 8/3/

18at 19:50;  Start 8/3/18 at 15:45;  Stop 8/3/18 at 23:44;  Status DC


Ceftriaxone Sodium 1 gm/ Dextrose 50 ml @  100 mls/hr Q24H IV ;  Start 8/3/18 

at 15:45;  Status UNV


Ceftriaxone Sodium (Rocephin) 1 gm Q24H IVP  Last administered on 8/16/18at 18:

04;  Start 8/3/18 at 17:00


Acetaminophen (Tylenol) 1,000 mg BID PO  Last administered on 8/17/18at 07:53;  

Start 8/3/18 at 21:00


Alprazolam (Xanax) 0.25 mg PRN Q6HRS  PRN PO ANXIETY / AGITATION Last 

administered on 8/15/18at 20:11;  Start 8/3/18 at 18:45


Carvedilol (Coreg) 3.125 mg BIDWMEALS PO  Last administered on 8/17/18at 07:54;

  Start 8/3/18 at 21:00


Cholestyramine Resin (Questran Light) 4 gm DAILY PO  Last administered on 8/8/ 18at 08:57;  Start 8/4/18 at 09:00;  Stop 8/10/18 at 07:46;  Status DC


Citalopram Hydrobromide (CeleXA) 10 mg DAILY PO  Last administered on 8/17/18at 

07:53;  Start 8/4/18 at 09:00


Clopidogrel Bisulfate (Plavix) 75 mg DAILY PO  Last administered on 8/8/18at 08:

57;  Start 8/4/18 at 09:00;  Stop 8/9/18 at 15:20;  Status DC


Diphenhydramine HCl (Benadryl) 50 mg QHS PO  Last administered on 8/16/18at 20:

48;  Start 8/3/18 at 21:00


Docusate Sodium (Colace) 100 mg DAILY PO  Last administered on 8/17/18at 07:52;

  Start 8/4/18 at 09:00


Insulin Glargine (Lantus) 20 units DAILYWBKFT SQ  Last administered on 8/11/ 18at 08:55;  Start 8/4/18 at 08:00;  Stop 8/12/18 at 08:32;  Status DC


Isosorbide Mononitrate (Imdur) 30 mg DAILY PO  Last administered on 8/17/18at 07

:53;  Start 8/4/18 at 09:00


Lisinopril (Prinivil) 10 mg DAILY PO  Last administered on 8/17/18at 07:54;  

Start 8/4/18 at 09:00


Multivitamins/ Minerals (I-Josias) 1 tab BID PO  Last administered on 8/16/18at 20

:48;  Start 8/3/18 at 21:00


Non-Formulary Medication (Alendronate Sodium ) 70 mg WEEKLY PO ;  Start 8/10/18 

at 09:00;  Status UNV


Bumetanide (Bumex) 2 mg DAILY PO  Last administered on 8/17/18at 07:54;  Start 8 /4/18 at 09:00


Insulin Human Lispro (HumaLOG) 5 units TIDWMEALS SQ  Last administered on 8/11/ 18at 17:35;  Start 8/4/18 at 08:00;  Stop 8/12/18 at 08:32;  Status DC


Methotrexate (Rheumatrex) 15 mg WEEKLY PO  Last administered on 8/16/18at 08:21

;  Start 8/9/18 at 09:00


Fish Oil (Fish Oil) 1,000 mg DAILY PO  Last administered on 8/17/18at 07:52;  

Start 8/4/18 at 09:00


Pantoprazole Sodium (Protonix) 40 mg DAILYAC PO  Last administered on 8/17/18at 

07:52;  Start 8/4/18 at 07:30


Simvastatin (Zocor) 40 mg QHS PO  Last administered on 8/16/18at 20:48;  Start 8

/3/18 at 21:00


Warfarin Sodium (Coumadin) 7.5 mg DAILY16 PO ;  Start 8/4/18 at 16:00;  Stop 8/5 /18 at 11:25;  Status DC


Warfarin Sodium (Coumadin Per Physician) 1 each PRN DAILY  PRN MC SEE COMMENTS;

  Start 8/3/18 at 20:00;  Stop 8/9/18 at 15:20;  Status DC


Furosemide (Lasix) 40 mg 1X  ONCE IVP  Last administered on 8/5/18at 12:05;  

Start 8/5/18 at 12:00;  Stop 8/5/18 at 12:01;  Status DC


Magnesium Sulfate/ Dextrose 100 ml @  25 mls/hr 1X  ONCE IV  Last administered 

on 8/6/18at 12:16;  Start 8/6/18 at 13:00;  Stop 8/6/18 at 16:59;  Status DC


Potassium Chloride (Klor-Con) 40 meq 1X  ONCE PO  Last administered on 8/6/18at 

12:15;  Start 8/6/18 at 12:30;  Stop 8/6/18 at 12:31;  Status DC


Polyethylene Glycol (miraLAX Powder BULK BOTTLE) 238 gm 1X  ONCE PO  Last 

administered on 8/8/18at 13:55;  Start 8/8/18 at 13:00;  Stop 8/8/18 at 13:01;  

Status DC


Magnesium Citrate (Citroma) 296 ml 1X  ONCE PO  Last administered on 8/8/18at 12

:58;  Start 8/8/18 at 12:00;  Stop 8/8/18 at 12:01;  Status DC


Bisacodyl (Dulcolax Tab) 10 mg 1X  ONCE PO  Last administered on 8/8/18at 12:58

;  Start 8/8/18 at 12:30;  Stop 8/8/18 at 12:31;  Status DC


Bisacodyl (Dulcolax Tab) 10 mg 1X  ONCE PO  Last administered on 8/8/18at 13:55

;  Start 8/8/18 at 14:00;  Stop 8/8/18 at 14:01;  Status DC


Ringer's Solution 1,000 ml @  50 mls/hr Q20H IV ;  Start 8/9/18 at 07:00;  Stop 

8/9/18 at 18:59;  Status DC


Diphenhydramine HCl (Benadryl) 25 mg 1X  ONCE IVP  Last administered on 8/9/ 18at 09:30;  Start 8/9/18 at 09:30;  Stop 8/9/18 at 09:31;  Status DC


Lactobacillus Rhamnosus (Culturelle) 1 cap BID PO ;  Start 8/9/18 at 21:00;  

Status Cancel


Midazolam HCl (Versed) 2 mg PRN 1X  PRN IV PRIOR TO PROCEDURE;  Start 8/9/18 at 

13:00;  Stop 8/10/18 at 12:59;  Status DC


Fentanyl Citrate (Fentanyl 2ml Vial) 25 mcg PRN Q5MIN  PRN IV X 2 DOSES FOR PAIN

;  Start 8/9/18 at 13:00;  Stop 8/10/18 at 12:59;  Status DC


Fentanyl Citrate (Fentanyl 2ml Vial) 50 mcg PRN Q5MIN  PRN IV X 2 DOSES FOR PAIN

;  Start 8/9/18 at 13:00;  Stop 8/10/18 at 12:59;  Status DC


Ringer's Solution 1,000 ml @  125 mls/hr Q8H IV  Last administered on 8/9/18at 

13:12;  Start 8/9/18 at 12:54;  Stop 8/9/18 at 22:35;  Status DC


Lidocaine HCl (Xylocaine-Mpf 1% Vial) 2 ml 1X PRN  PRN ID IV START;  Start 8/9/ 18 at 13:00;  Stop 8/10/18 at 12:59;  Status DC


Aspirin (Ecotrin) 81 mg DAILYWBKFT PO  Last administered on 8/17/18at 07:53;  

Start 8/10/18 at 08:00


Iron Sucrose 500 mg/Sodium Chloride 275 ml @  78.571 mls/ hr 1X  ONCE IV  Last 

administered on 8/9/18at 17:30;  Start 8/9/18 at 17:30;  Stop 8/9/18 at 20:59;  

Status DC


Iohexol (Omnipaque 300 Mg/ml) 75 ml 1X  ONCE IV  Last administered on 8/10/18at 

06:15;  Start 8/10/18 at 06:15;  Stop 8/10/18 at 06:16;  Status DC


Iohexol (Omnipaque 240 Mg/ml) 50 ml 1X  ONCE PO  Last administered on 8/10/18at 

06:15;  Start 8/10/18 at 06:15;  Stop 8/10/18 at 06:16;  Status DC


Info (CONTRAST GIVEN -- Rx MONITORING) 1 each PRN DAILY  PRN MC SEE COMMENTS;  

Start 8/10/18 at 06:15;  Stop 8/12/18 at 06:14;  Status DC


Cholestyramine Resin (Questran Light) 4 gm DAILY PO ;  Start 8/10/18 at 13:00


Insulin Glargine (Lantus) 17 units DAILYWBKFT SQ  Last administered on 8/17/ 18at 08:10;  Start 8/13/18 at 08:00


Insulin Human Lispro (HumaLOG) 4 units TIDWMEALS SQ  Last administered on 8/17/ 18at 08:10;  Start 8/12/18 at 12:00


Acetaminophen (Tylenol) 650 mg PRN Q6HRS  PRN PO TEMP > 100.4F Last 

administered on 8/13/18at 08:54;  Start 8/12/18 at 16:15


Acetaminophen (Tylenol Supp) 650 mg PRN Q4HRS  PRN ME TEMP > 100.4F;  Start 8/12 /18 at 16:15


Heparin Sodium/ Dextrose 500 ml @ 0 mls/hr CONT  PRN IV SEE I/O RECORD;  Start 8 /12/18 at 16:15;  Status UNV


Heparin Sodium/ Dextrose 500 ml @ 0 mls/hr CONT  PRN IV SEE I/O RECORD Last 

administered on 8/15/18at 11:37;  Start 8/12/18 at 16:30;  Stop 8/15/18 at 16:49

;  Status DC


Heparin Sodium (Porcine) (Heparin Sodium) 2,600 unit PRN Q6HRS  PRN IV FOR UFH 

LEVEL LESS THAN 0.2 Last administered on 8/13/18at 03:31;  Start 8/12/18 at 16:

30;  Stop 8/15/18 at 16:49;  Status DC


Info (Anti-Coagulation Monitoring By Pharmacy) 1 each PRN DAILY  PRN MC SEE 

COMMENTS Last administered on 8/16/18at 14:52;  Start 8/12/18 at 16:45


Gadobutrol (Gadavist) 10 mmol 1X  ONCE IV ;  Start 8/13/18 at 13:45;  Stop 8/13/ 18 at 13:46;  Status DC


Propofol 20 ml @ As Directed STK-MED ONCE IV ;  Start 8/9/18 at 13:08;  Stop 8/ 13/18 at 16:42;  Status DC


Lidocaine HCl (Lidocaine Pf 2% Vial) 5 ml STK-MED ONCE .ROUTE ;  Start 8/9/18 

at 13:20;  Stop 8/13/18 at 16:42;  Status DC


Nystatin (Nystop) 1 fatemeh BID TP  Last administered on 8/16/18at 20:50;  Start 8/ 14/18 at 21:00


Gadobutrol (Gadavist) 9 mmol 1X  ONCE IV  Last administered on 8/14/18at 11:29;

  Start 8/14/18 at 11:00;  Stop 8/14/18 at 11:01;  Status DC


Rivaroxaban (Xarelto) 10 mg DAILYWSUP PO ;  Start 8/16/18 at 17:00;  Stop 8/16/ 18 at 17:00;  Status DC


Rivaroxaban (Xarelto) 15 mg DAILYWSUP PO  Last administered on 8/16/18at 18:04;

  Start 8/16/18 at 17:00





Active Scripts


Active


Reported


Carvedilol 3.125 Mg Tablet 3.125 Mg PO BID


Methotrexate (Methotrexate Sodium) 2.5 Mg Tablet 6 Tab PO WEEKLY


Benadryl (Diphenhydramine Hcl) 25 Mg Capsule 2 Cap PO QHS


Bumetanide 2 Mg Tablet 2 Mg PO DAILY


Ocuvite Tablet (Vit A,C & E/Lutein/Minerals) 1 Each Tablet 1 Each PO BID


Prevalite Packet (Cholestyramine/Aspartame) 4 Gm Powd.pack 4 Gm PO 


Citalopram Hbr (Citalopram Hydrobromide) 10 Mg Tablet 10 Mg PO DAILY


Clopidogrel (Clopidogrel Bisulfate) 75 Mg Tablet 75 Mg PO DAILY


Alprazolam 0.25 Mg Tablet 0.25 Mg PO PRN Q6HRS PRN


Isosorbide Mononitrate Er (Isosorbide Mononitrate) 30 Mg Tab.er.24h 30 Mg PO 

DAILY


Prilosec Otc (Omeprazole Magnesium) 20 Mg Tablet.dr 20 Mg PO DAILY


Novolog Flexpen (Insulin Aspart) 100 Unit/1 Ml Insuln.pen 5 Unit SQ TIDBFRMEAL


Lantus Solostar (Insulin Glargine,Hum.rec.anlog) 100 Unit/1 Ml Insuln.pen 20 

Unit SQ DAILYWBKFT


Acetaminophen 500 Mg Tablet 2 Tab PO BID


Vitamin D-3 (Cholecalciferol (Vitamin D3)) 2,000 Unit Capsule 1,000 Unit PO 


Alendronate Sodium 70 Mg Tablet 70 Mg PO WEEKLY


Warfarin Sodium 5 Mg Tablet 1.5 Tab PO DAILY


Lisinopril 10 Mg Tablet 10 Mg PO DAILY


Simvastatin 40 Mg Tablet 40 Mg PO DAILY


Colace (Docusate Sodium) 100 Mg Capsule 100 Mg PO 


Garlic 1,000 Mg Capsule 1,000 Mg PO 


Multivitamins (Multivitamin) 1 Each Capsule 1 Each PO 


[place] 3 Fish Oil Softgel (Omega-3 Fatty Acids/Fish Oil) 1 Each Capsule. 1 

Each PO DAILY


Vitals/I & O





Vital Sign - Last 24 Hours








 8/16/18 8/16/18 8/16/18 8/16/18





 11:00 15:00 18:04 19:46


 


Temp 97.5 97.7  98.3





 97.5 97.7  98.3


 


Pulse 82 81 81 83


 


Resp 16 24  18


 


B/P (MAP) 128/41 (70) 123/43 (69) 123/43 116/50 (72)


 


Pulse Ox 96 94  96


 


O2 Delivery Room Air Room Air  Room Air


 


    





    





 8/16/18 8/16/18 8/17/18 8/17/18





 20:00 23:03 03:16 06:55


 


Temp  98.8 98.0 97.4





  98.8 98.0 97.4


 


Pulse  84 71 60


 


Resp  16 16 18


 


B/P (MAP)  114/37 (62) 126/39 (68) 117/65 (82)


 


Pulse Ox  93 94 92


 


O2 Delivery Room Air Room Air Room Air Room Air


 


    





    





 8/17/18 8/17/18 8/17/18 8/17/18





 07:53 07:54 07:54 08:00


 


Pulse 60 60 60 


 


B/P (MAP) 117/65 117/65 117/65 


 


O2 Delivery    Room Air


 


O2 Flow Rate    2.0














Intake and Output   


 


 8/16/18 8/16/18 8/17/18





 15:00 23:00 07:00


 


Intake Total 360 ml 330 ml 210 ml


 


Balance 360 ml 330 ml 210 ml

















HERBERTH STEWART MD Aug 17, 2018 08:59

## 2018-08-17 NOTE — DS
DATE OF DISCHARGE:  08/17/2018



PRIMARY DIAGNOSES:

1.  Shortness of breath with anemia.

2.  Chills and sweats on admission.

3.  Atrial fibrillation.

4.  Coronary artery disease.

5.  New diagnosis of adenocarcinoma of the esophagus.

6.  Cerebrovascular accident due to atrial fibrillation during stay with minimal

residual.

7.  Diabetes.



CHIEF COMPLAINT AND HISTORY OF PRESENT ILLNESS:  This 82-year-old white female

was seen in the office earlier in the week with complaints of shortness of

breath with exertion and chest pressure.  Workup at that point found her to be

anemic with hemoglobin of 8.6 with decreased red blood cell indices.  She was

rechecked on the day of admission, was worse with night sweats as well as

chills, soaking ____ sheets in bed, had to change clothes at night.  ____ ill in

the office and was admitted for new onset shortness of breath, possibly felt to

be related to anemia as well as chills and sweats that she was having.



SUMMARY OF STAY:  The patient was admitted.  Hemoglobin remained relatively

stable throughout the stay.  She was initially coagulopathic with warfarin with

an INR in the 4 range and this was held, particularly for GI to do GI workup for

the anemia.  Iron level was low at 15.  Ferritin was normal at 141.  GI

evaluation for the anemia led to the findings of poorly differentiated

adenocarcinoma ____ with CT scanning showing evidence of metastatic disease in

the chest.  Colonoscopy was normal during the stay.  Dr. Champion from Oncology and

Dr. Robertson from Radiation Therapy were consulted and their feelings were when

she was stabilized as an outpatient to get a PET scan.  Neurology saw her when

she developed neurological symptoms during the stay and was found by brain MRI

to have multiple cerebral infarcts, particularly in the area of the right

parietal lobe, medial left frontal lobe and left parietal occipital lobe.  This

was felt to be due to the AFib with holding anticoagulation for the GI workup. 

She was transitioned from heparin to eventually Xarelto by the time of

discharge.  She was weak and felt necessary for skilled nursing at the time of

discharge and this was accomplished on the day of dismissal.



DISPOSITION:  The patient is discharged to skilled nursing.  Please see orders

regarding diet, medication, activity, etc.  We will continue to follow her

there.

 



______________________________

MARSHALL A. APPL, MD



DR:  [email or web address]  JOB#:  [id number] / [id number]

DD:  08/17/2018 08:32  DT:  08/17/2018 10:43

## 2018-08-17 NOTE — PDOC
Subjective:


Subjective:


Discharging today, playing cards with her daughter.





Objective:


Vital Signs:





 Vital Signs








  Date Time  Temp Pulse Resp B/P (MAP) Pulse Ox O2 Delivery O2 Flow Rate FiO2


 


8/17/18 10:50 97.5 87 18 138/49 (78) 94 Room Air  





 97.5       


 


8/17/18 08:00       2.0 








Labs:





Laboratory Tests








Test


 8/16/18


11:48 8/16/18


16:39 8/16/18


20:29 8/17/18


07:12


 


Glucose (Fingerstick)


 124 mg/dL


(70-99) 111 mg/dL


(70-99) 143 mg/dL


(70-99) 125 mg/dL


(70-99)











PE:





GEN: NAD, in chair


LUNGS: room air


NEURO/PSYCH: A & O 3





A/P:


Esophageal adenocarcinoma





--


DC to PP per primary.  Plans for PET scan and follow-up w/ Dr. Champion.











JOSE FOURNIER Aug 17, 2018 11:19

## 2018-08-30 ENCOUNTER — HOSPITAL ENCOUNTER (OUTPATIENT)
Dept: HOSPITAL 61 - PETSC | Age: 83
Discharge: HOME | End: 2018-08-30
Attending: RADIOLOGY
Payer: MEDICARE

## 2018-08-30 DIAGNOSIS — I48.0: ICD-10-CM

## 2018-08-30 DIAGNOSIS — Z90.13: ICD-10-CM

## 2018-08-30 DIAGNOSIS — Z95.5: ICD-10-CM

## 2018-08-30 DIAGNOSIS — Z95.1: ICD-10-CM

## 2018-08-30 DIAGNOSIS — K21.9: ICD-10-CM

## 2018-08-30 DIAGNOSIS — Z85.3: ICD-10-CM

## 2018-08-30 DIAGNOSIS — Z96.653: ICD-10-CM

## 2018-08-30 DIAGNOSIS — Z79.01: ICD-10-CM

## 2018-08-30 DIAGNOSIS — C15.5: Primary | ICD-10-CM

## 2018-08-30 DIAGNOSIS — I25.10: ICD-10-CM

## 2018-08-30 DIAGNOSIS — E78.5: ICD-10-CM

## 2018-08-30 DIAGNOSIS — Z86.2: ICD-10-CM

## 2018-08-30 DIAGNOSIS — Z87.891: ICD-10-CM

## 2018-08-30 DIAGNOSIS — E11.9: ICD-10-CM

## 2018-08-30 DIAGNOSIS — R91.1: ICD-10-CM

## 2018-08-30 PROCEDURE — A9552 F18 FDG: HCPCS

## 2018-08-30 PROCEDURE — 78815 PET IMAGE W/CT SKULL-THIGH: CPT

## 2018-08-30 NOTE — RAD
FDG tumor localization scan, PET/CT, 8/30/2018:



History: Esophageal cancer initial staging, previous right breast cancer



Following IV injection of 14.3 mCi of 18 F-FDG, imaging was performed from the

skull base to the proximal thighs.  The noncontrast CT component was performed

for attenuation correction and anatomic localization purposes rather than for

primary diagnosis.  The patient's blood glucose level at the time of injection

was 187 MG/DL.



Physiologic FDG activity is evident in the neck.



There is a 6 mm hypermetabolic nodule in the anterior aspect of the right

upper lobe demonstrating a maximum SUV of 3.1.



A 5 mm nodule in the lateral aspect of the right lower lobe as seen on image

124 of CT series #3 demonstrates low level FDG uptake with a maximum SUV of

only 1.6.  This lack of avid uptake may be due to its small size.



A a similar size small subpleural nodule seen laterally in the left upper lobe

demonstrates low level FDG uptake with a maximum SUV of 2.2.



There are multiple smaller tiny bilateral pulmonary nodules which do not

demonstrate hypermetabolic activity, although again, that may be due to their

small sizes.



There is hypermetabolic activity related to the thick-walled distal esophagus

compatible with the patient's known malignancy.  The maximum SUV in this

region is 15.8.  No definite separate hypermetabolic mediastinal adenopathy is

seen.



Normal GI tract and urinary tract activity is present in the abdomen and

pelvis.  There is a focus of hypermetabolic activity corresponding location to

the posterior inferior margin of the right lobe of the liver.  The CT

component shows no obvious mass at this level.  It is possible that this may

actually represent mis registered bowel activity.   The recent CT abdomen

study did not demonstrate a mass in this region.  There is also a small focus

of increased activity in the left lobe without a corresponding CT abnormality.



The abdominal and pelvic FDG uptake is otherwise unremarkable.



No hypermetabolic bony lesion is seen.





IMPRESSION:

1.  Hypermetabolic distal esophageal mass compatible with patient's known

esophageal malignancy.

2.  No definite hypermetabolic mediastinal adenopathy is delineated.

3.  Hypermetabolic right upper lobe pulmonary nodule with several other small

pulmonary nodule demonstrating low level FDG uptake.  These findings may be

inflammatory or metastatic.

4.  Questionable hypermetabolic liver lesions with no corresponding

abnormality evident on the recent CT study.  Hepatic protocol MR imaging may

be useful for further evaluation

## 2018-09-17 ENCOUNTER — HOSPITAL ENCOUNTER (OUTPATIENT)
Dept: HOSPITAL 61 - INTRAD | Age: 83
Discharge: HOME | End: 2018-09-17
Attending: INTERNAL MEDICINE
Payer: MEDICARE

## 2018-09-17 VITALS — DIASTOLIC BLOOD PRESSURE: 60 MMHG | SYSTOLIC BLOOD PRESSURE: 138 MMHG

## 2018-09-17 VITALS — SYSTOLIC BLOOD PRESSURE: 120 MMHG | DIASTOLIC BLOOD PRESSURE: 54 MMHG

## 2018-09-17 VITALS — SYSTOLIC BLOOD PRESSURE: 134 MMHG | DIASTOLIC BLOOD PRESSURE: 53 MMHG

## 2018-09-17 VITALS — WEIGHT: 213 LBS | HEIGHT: 59 IN | BODY MASS INDEX: 42.94 KG/M2

## 2018-09-17 VITALS — SYSTOLIC BLOOD PRESSURE: 128 MMHG | DIASTOLIC BLOOD PRESSURE: 48 MMHG

## 2018-09-17 VITALS — SYSTOLIC BLOOD PRESSURE: 140 MMHG | DIASTOLIC BLOOD PRESSURE: 53 MMHG

## 2018-09-17 VITALS — SYSTOLIC BLOOD PRESSURE: 133 MMHG | DIASTOLIC BLOOD PRESSURE: 71 MMHG

## 2018-09-17 DIAGNOSIS — Z88.8: ICD-10-CM

## 2018-09-17 DIAGNOSIS — C15.9: Primary | ICD-10-CM

## 2018-09-17 DIAGNOSIS — Z79.01: ICD-10-CM

## 2018-09-17 DIAGNOSIS — Z79.899: ICD-10-CM

## 2018-09-17 DIAGNOSIS — Z91.011: ICD-10-CM

## 2018-09-17 LAB
ANISOCYTOSIS BLD QL SMEAR: SLIGHT
BASOPHILS # BLD AUTO: 0.1 X10^3/UL (ref 0–0.2)
BASOPHILS NFR BLD: 1 % (ref 0–3)
EOSINOPHIL NFR BLD: 0.2 X10^3/UL (ref 0–0.7)
EOSINOPHIL NFR BLD: 3 % (ref 0–3)
ERYTHROCYTE [DISTWIDTH] IN BLOOD BY AUTOMATED COUNT: 20.4 % (ref 11.5–14.5)
HCT VFR BLD CALC: 28.5 % (ref 36–47)
HGB BLD-MCNC: 9.4 G/DL (ref 12–15.5)
LYMPHOCYTES # BLD: 0.8 X10^3/UL (ref 1–4.8)
LYMPHOCYTES NFR BLD AUTO: 16 % (ref 24–48)
MCH RBC QN AUTO: 29 PG (ref 25–35)
MCHC RBC AUTO-ENTMCNC: 33 G/DL (ref 31–37)
MCV RBC AUTO: 87 FL (ref 79–100)
MONO #: 0.3 X10^3/UL (ref 0–1.1)
MONOCYTES NFR BLD: 5 % (ref 0–9)
NEUT #: 3.9 X10^3UL (ref 1.8–7.7)
NEUTROPHILS NFR BLD AUTO: 75 % (ref 31–73)
PLATELET # BLD AUTO: 216 X10^3/UL (ref 140–400)
PLATELET # BLD EST: ADEQUATE 10*3/UL
PROTHROMBIN TIME: 15.6 SEC (ref 11.7–14)
RBC # BLD AUTO: 3.29 X10^6/UL (ref 3.5–5.4)
WBC # BLD AUTO: 5.1 X10^3/UL (ref 4–11)

## 2018-09-17 PROCEDURE — 36415 COLL VENOUS BLD VENIPUNCTURE: CPT

## 2018-09-17 PROCEDURE — 99153 MOD SED SAME PHYS/QHP EA: CPT

## 2018-09-17 PROCEDURE — 77001 FLUOROGUIDE FOR VEIN DEVICE: CPT

## 2018-09-17 PROCEDURE — 99152 MOD SED SAME PHYS/QHP 5/>YRS: CPT

## 2018-09-17 PROCEDURE — C1769 GUIDE WIRE: HCPCS

## 2018-09-17 PROCEDURE — C1892 INTRO/SHEATH,FIXED,PEEL-AWAY: HCPCS

## 2018-09-17 PROCEDURE — 76937 US GUIDE VASCULAR ACCESS: CPT

## 2018-09-17 PROCEDURE — 85610 PROTHROMBIN TIME: CPT

## 2018-09-17 PROCEDURE — 85025 COMPLETE CBC W/AUTO DIFF WBC: CPT

## 2018-09-17 PROCEDURE — C1751 CATH, INF, PER/CENT/MIDLINE: HCPCS

## 2018-09-17 PROCEDURE — C1788 PORT, INDWELLING, IMP: HCPCS

## 2018-09-17 PROCEDURE — 36561 INSERT TUNNELED CV CATH: CPT

## 2018-09-17 NOTE — RAD
Procedure: Port-A-Cath placement



Clinical Indication: 83-year-old female with history of esophageal cancer



Sedation: Conscious sedation was administered with a total intraprocedural

face-to-face time of 35 minutes.  The patient was monitored by a qualified

independent observer throughout the time of sedation.  Please refer to the

medical record for exact doses of medications utilized to achieve moderate

sedation. 



Antibiotics: Antibiotic was administered intravenously within 1 hour of the

procedure start time. 





Exposure: Fluoro Time: 0.4 minutes           Images: 1



Contrast: None



Sterility: All elements of maximal sterile barrier technique including the use

of a cap, mask, sterile gown, sterile gloves, large sterile sheet, appropriate

hand hygiene, and 2% chlorhexidine for cutaneous antisepsis (or acceptable

alternative antiseptic per current guidelines) were followed for this

procedure. 



Consent: 

The procedure was explained in its entirety to the patient or the patients

designated representative by a member of the treatment team, including a

discussion of the risks, benefits and commonly accepted alternatives to the

procedure, as well as the expected consequences of no therapy whatsoever.  

Discussion of the risks included, but was not limited to, those that are most

frequent and those that are rare but possibly severe or life-threatening, as

well as the possibility of unforeseen complications.



Technique and Findings: Ultrasound interrogation of the left neck revealed

patency and compressibility of the internal jugular vein.  A hardcopy

ultrasound image was recorded as a 21-gauge micropuncture needle was used to

gain access to this vessel.  The needle was exchanged over a wire for a

peel-away sheath.  The skin over the ipsilateral anterior chest wall was then

copiously anesthetized with 1% lidocaine plus epinephrine, and a small

dermatotomy was made.  Blunt dissection techniques were used to create a

pocket for the port.  The port was then tunneled subcutaneously towards the

neck dermatotomy then deployed under fluoroscopic guidance through the

peel-away sheath such that the distal tip resided in the proximal right

atrium.  The port was accessed and found to flush and aspirate with ease.  The

port was packed with heparin.  The pocket was copiously irrigated with sterile

saline then closed with deep interrupted and running subcuticular 4-0 Vicryl

suture.  Dermabond was used to close the neck dermatotomy.  



Complications: No immediate



Impression:

1. Left IJ Port-A-Cath placement as described.

## 2018-09-17 NOTE — PDOC
MODERATE SEDATION ASSESSMENT


RISKS/ALTERNATIVES


Risks/Alternatives


Risks and alternatives of this type of sedation and procedure discussed with:


RISK/ALTERNATIVES:  Patient





H & P ON CHART


H & P


H & P on chart and reviewed for co-morbid conditions and appropriate labs.


H&P ON CHART:  Yes





PREGNANCY STATUS


PREG STATUS ASSESSED:  Yes





MEDS/ALLERGIES REVIEWED


Meds/Allergies Reviewed


Medications and Allergies including time and route of recently administered 

narcotics and sedatives.


MEDS/ALLERGIES REVIEWED:  Yes





ASA RATING


ASA RATING:  II





AIRWAY ASSESSMENT


Airway Assessment


Airway patency, oral function limitations, presence  of caps, crowns, dentures, 

partials, and ability to extend neck assessed.


AIRWAY ASSESSMENT:  Yes





MALLAMPATI SCORE


MALLAMPATI SCORE:  II





PRE-SEDATION ASSESSMENT


PRE-SEDATION ASSESSMENT:  Yes











ROBBIE FERGUSON MD Sep 17, 2018 11:47

## 2018-09-17 NOTE — PDOC1
History and Physical


Date of Procedure


Date of Admission








History of Present Illness


Reason for Visit


Adult with esophageal cancer





Past Medical History


Past Medical History


see nursing pre-op assessment





Current Medications


Current Medications





Current Medications


Cefazolin Sodium/ Dextrose 50 ml @  100 mls/hr 1X  ONCE IV ;  Start 9/17/18 at 

10:00;  Stop 9/17/18 at 10:29;  Status DC


Lidocaine/ Epinephrine (LIDOCAINE 1%-EPI 1:100,000 Multi-Dose) 20 ml STK-MED 

ONCE .ROUTE ;  Start 9/17/18 at 10:26;  Stop 9/17/18 at 10:27;  Status DC


Heparin Sodium (Porcine) (Hep Lock Adult) 500 unit STK-MED ONCE IV ;  Start 9/17 /18 at 10:26;  Stop 9/17/18 at 10:27;  Status DC


Midazolam HCl (Versed) 2 mg STK-MED ONCE .ROUTE ;  Start 9/17/18 at 10:43;  

Stop 9/17/18 at 10:44;  Status DC


Fentanyl Citrate (Fentanyl 2ml Vial) 100 mcg STK-MED ONCE .ROUTE ;  Start 9/17/ 18 at 10:43;  Stop 9/17/18 at 10:44;  Status DC


Cefazolin Sodium/ Dextrose 50 ml @ As Directed STK-MED ONCE IV ;  Start 9/17/18 

at 10:43;  Stop 9/17/18 at 10:44;  Status DC





Active Scripts


Active


Reported


Acetaminophen 500 Mg Tablet 0.5 Tab PO Q6HRS


Xarelto (Rivaroxaban) 15 Mg Tablet 15 Mg PO DAILY


Carvedilol 3.125 Mg Tablet 3.125 Mg PO BID


Methotrexate (Methotrexate Sodium) 2.5 Mg Tablet 6 Tab PO WEEKLY


Benadryl (Diphenhydramine Hcl) 25 Mg Capsule 2 Cap PO QHS


Bumetanide 2 Mg Tablet 2 Mg PO DAILY


Ocuvite Tablet (Vit A,C & E/Lutein/Minerals) 1 Each Tablet 1 Each PO BID


Citalopram Hbr (Citalopram Hydrobromide) 10 Mg Tablet 10 Mg PO DAILY


Alprazolam 0.25 Mg Tablet 0.25 Mg PO PRN Q6HRS PRN


Isosorbide Mononitrate Er (Isosorbide Mononitrate) 30 Mg Tab.er.24h 30 Mg PO 

DAILY


Prilosec Otc (Omeprazole Magnesium) 20 Mg Tablet.dr 20 Mg PO DAILY


Novolog Flexpen (Insulin Aspart) 100 Unit/1 Ml Insuln.pen 4 Unit SQ [id number]


Lantus Solostar (Insulin Glargine,Hum.rec.anlog) 100 Unit/1 Ml Insuln.pen 17 

Unit SQ [id number]


Vitamin D-3 (Cholecalciferol (Vitamin D3)) 2,000 Unit Capsule 1,000 Unit PO 

DAILY


Lisinopril 10 Mg Tablet 10 Mg PO DAILY


Simvastatin 40 Mg Tablet 40 Mg PO DAILY


Colace (Docusate Sodium) 100 Mg Capsule 100 Mg PO DAILY


Garlic 1,000 Mg Capsule 1,000 Mg PO DAILY


Multivitamins (Multivitamin) 1 Each Capsule 1 Each PO DAILY





Allergies


Allergies:  


Coded Allergies:  


     milk (Verified  Allergy, Intermediate, 8/9/18)


     montelukast (Verified  Allergy, Intermediate, 8/9/18)





Physical Exam


Other


see nursing pre-op assessment





Assessment


Assessment


Esophageal cancer





Plan


Plan


ROBBIE Dixon MD Sep 17, 2018 11:48

## 2018-09-17 NOTE — PDOC
BRIEF OPERATIVE NOTE


Pre-Op Diagnosis


esophageal cancer


Post-Op Diagnosis


same


Procedure Performed


Port


Surgeon


Paulette


Anesthesia Type:  Conscious Sedation


Findings


Left IJ port


Complications


No immediate











ROBBIE FERGUSON MD Sep 17, 2018 11:46